# Patient Record
Sex: MALE | Race: WHITE | NOT HISPANIC OR LATINO | Employment: OTHER | URBAN - NONMETROPOLITAN AREA
[De-identification: names, ages, dates, MRNs, and addresses within clinical notes are randomized per-mention and may not be internally consistent; named-entity substitution may affect disease eponyms.]

---

## 2018-01-08 ENCOUNTER — OFFICE VISIT CONVERTED (OUTPATIENT)
Dept: FAMILY MEDICINE CLINIC | Age: 53
End: 2018-01-08
Attending: FAMILY MEDICINE

## 2018-01-09 LAB
ALBUMIN SERPL-MCNC: 4.6 G/DL
ALBUMIN/GLOB SERPL: 1.6 {RATIO}
ALP SERPL-CCNC: 69 IU/L
ALT SERPL-CCNC: 44 IU/L
AST SERPL-CCNC: 45 IU/L
BILIRUB SERPL-MCNC: 0.5 MG/DL
BUN SERPL-MCNC: 10 MG/DL
BUN/CREAT SERPL: 9
CALCIUM SERPL-MCNC: 9.7 MG/DL
CHLORIDE SERPL-SCNC: 101 MMOL/L
CHOLEST SERPL-MCNC: 121 MG/DL
CO2 SERPL-SCNC: 27 MMOL/L
CONV TOTAL PROTEIN: 7.4 G/DL
CREAT UR-MCNC: 1.06 MG/DL
ERYTHROCYTE [DISTWIDTH] IN BLOOD BY AUTOMATED COUNT: 13.7 %
GLOBULIN UR ELPH-MCNC: 2.8 G/DL
GLUCOSE SERPL-MCNC: 103 MG/DL
HCT VFR BLD AUTO: 44 %
HDLC SERPL-MCNC: 27 MG/DL
HGB BLD-MCNC: 14.9 G/DL
INR PPP: 2.8
LDLC SERPL CALC-MCNC: 61 MG/DL
MCH RBC QN AUTO: 29.9 PG
MCHC RBC AUTO-ENTMCNC: 33.9 G/DL
MCV RBC AUTO: 88 FL
PLATELET # BLD AUTO: 158 X10E3/UL
POTASSIUM SERPL-SCNC: 4.5 MMOL/L
PROTHROMBIN TIME: 27.1 SEC
PSA SERPL-MCNC: 0.6 NG/ML
RBC # BLD AUTO: 4.98 X10E6/UL
SODIUM SERPL-SCNC: 141 MMOL/L
TRIGL SERPL-MCNC: 166 MG/DL
VLDLC SERPL-MCNC: 33 MG/DL
WBC # BLD AUTO: 4.7 X10E3/UL

## 2018-07-24 ENCOUNTER — OFFICE VISIT CONVERTED (OUTPATIENT)
Dept: FAMILY MEDICINE CLINIC | Age: 53
End: 2018-07-24
Attending: FAMILY MEDICINE

## 2018-10-24 ENCOUNTER — OFFICE VISIT CONVERTED (OUTPATIENT)
Dept: FAMILY MEDICINE CLINIC | Age: 53
End: 2018-10-24
Attending: FAMILY MEDICINE

## 2019-04-24 ENCOUNTER — OFFICE VISIT CONVERTED (OUTPATIENT)
Dept: FAMILY MEDICINE CLINIC | Age: 54
End: 2019-04-24
Attending: FAMILY MEDICINE

## 2019-04-24 ENCOUNTER — HOSPITAL ENCOUNTER (OUTPATIENT)
Dept: OTHER | Facility: HOSPITAL | Age: 54
Discharge: HOME | End: 2019-04-24
Attending: FAMILY MEDICINE

## 2019-04-24 LAB
ALBUMIN SERPL-MCNC: 4.4 G/DL (ref 3.5–5)
ALBUMIN/GLOB SERPL: 1.6 {RATIO} (ref 1.4–2.6)
ALP SERPL-CCNC: 65 U/L (ref 56–119)
ALT SERPL-CCNC: 27 U/L (ref 10–40)
ANION GAP SERPL CALC-SCNC: 18 MMOL/L (ref 8–19)
AST SERPL-CCNC: 39 U/L (ref 15–50)
BILIRUB SERPL-MCNC: 0.66 MG/DL (ref 0.2–1.3)
BUN SERPL-MCNC: 12 MG/DL (ref 5–25)
BUN/CREAT SERPL: 13 {RATIO} (ref 6–20)
CALCIUM SERPL-MCNC: 9.8 MG/DL (ref 8.7–10.4)
CHLORIDE SERPL-SCNC: 105 MMOL/L (ref 99–111)
CHOLEST SERPL-MCNC: 102 MG/DL (ref 107–200)
CHOLEST/HDLC SERPL: 3.4 {RATIO} (ref 3–6)
CONV CO2: 24 MMOL/L (ref 22–32)
CONV TOTAL PROTEIN: 7.1 G/DL (ref 6.3–8.2)
CREAT UR-MCNC: 0.91 MG/DL (ref 0.7–1.2)
GFR SERPLBLD BASED ON 1.73 SQ M-ARVRAT: >60 ML/MIN/{1.73_M2}
GLOBULIN UR ELPH-MCNC: 2.7 G/DL (ref 2–3.5)
GLUCOSE SERPL-MCNC: 120 MG/DL (ref 70–99)
HDLC SERPL-MCNC: 30 MG/DL (ref 40–60)
LDLC SERPL CALC-MCNC: 46 MG/DL (ref 70–100)
OSMOLALITY SERPL CALC.SUM OF ELEC: 295 MOSM/KG (ref 273–304)
POTASSIUM SERPL-SCNC: 4.8 MMOL/L (ref 3.5–5.3)
PSA SERPL-MCNC: 0.77 NG/ML (ref 0–4)
SODIUM SERPL-SCNC: 142 MMOL/L (ref 135–147)
TRIGL SERPL-MCNC: 128 MG/DL (ref 40–150)
VLDLC SERPL-MCNC: 26 MG/DL (ref 5–37)

## 2019-05-06 ENCOUNTER — CONVERSION ENCOUNTER (OUTPATIENT)
Dept: FAMILY MEDICINE CLINIC | Age: 54
End: 2019-05-06

## 2019-05-07 ENCOUNTER — OFFICE VISIT CONVERTED (OUTPATIENT)
Dept: FAMILY MEDICINE CLINIC | Age: 54
End: 2019-05-07
Attending: FAMILY MEDICINE

## 2019-05-20 ENCOUNTER — CONVERSION ENCOUNTER (OUTPATIENT)
Dept: FAMILY MEDICINE CLINIC | Age: 54
End: 2019-05-20

## 2019-06-03 ENCOUNTER — CONVERSION ENCOUNTER (OUTPATIENT)
Dept: FAMILY MEDICINE CLINIC | Age: 54
End: 2019-06-03

## 2019-07-01 ENCOUNTER — CONVERSION ENCOUNTER (OUTPATIENT)
Dept: FAMILY MEDICINE CLINIC | Age: 54
End: 2019-07-01

## 2019-08-02 ENCOUNTER — CONVERSION ENCOUNTER (OUTPATIENT)
Dept: FAMILY MEDICINE CLINIC | Age: 54
End: 2019-08-02

## 2019-08-30 ENCOUNTER — CONVERSION ENCOUNTER (OUTPATIENT)
Dept: FAMILY MEDICINE CLINIC | Age: 54
End: 2019-08-30

## 2019-09-27 ENCOUNTER — CONVERSION ENCOUNTER (OUTPATIENT)
Dept: FAMILY MEDICINE CLINIC | Age: 54
End: 2019-09-27

## 2019-10-03 ENCOUNTER — CONVERSION ENCOUNTER (OUTPATIENT)
Dept: FAMILY MEDICINE CLINIC | Age: 54
End: 2019-10-03

## 2019-11-21 ENCOUNTER — CONVERSION ENCOUNTER (OUTPATIENT)
Dept: FAMILY MEDICINE CLINIC | Age: 54
End: 2019-11-21

## 2019-12-26 ENCOUNTER — CONVERSION ENCOUNTER (OUTPATIENT)
Dept: FAMILY MEDICINE CLINIC | Age: 54
End: 2019-12-26

## 2020-01-10 ENCOUNTER — CONVERSION ENCOUNTER (OUTPATIENT)
Dept: FAMILY MEDICINE CLINIC | Age: 55
End: 2020-01-10

## 2020-01-31 ENCOUNTER — CONVERSION ENCOUNTER (OUTPATIENT)
Dept: FAMILY MEDICINE CLINIC | Age: 55
End: 2020-01-31

## 2020-02-28 ENCOUNTER — CONVERSION ENCOUNTER (OUTPATIENT)
Dept: FAMILY MEDICINE CLINIC | Age: 55
End: 2020-02-28

## 2020-03-10 ENCOUNTER — HOSPITAL ENCOUNTER (OUTPATIENT)
Dept: OTHER | Facility: HOSPITAL | Age: 55
Discharge: HOME | End: 2020-03-10
Attending: FAMILY MEDICINE

## 2020-03-10 ENCOUNTER — OFFICE VISIT CONVERTED (OUTPATIENT)
Dept: FAMILY MEDICINE CLINIC | Age: 55
End: 2020-03-10
Attending: FAMILY MEDICINE

## 2020-03-10 LAB
ALBUMIN SERPL-MCNC: 4.7 G/DL (ref 3.5–5)
ALBUMIN/GLOB SERPL: 1.5 {RATIO} (ref 1.4–2.6)
ALP SERPL-CCNC: 78 U/L (ref 56–119)
ALT SERPL-CCNC: 34 U/L (ref 10–40)
ANION GAP SERPL CALC-SCNC: 21 MMOL/L (ref 8–19)
APPEARANCE UR: CLEAR
AST SERPL-CCNC: 40 U/L (ref 15–50)
BACTERIA UR QL AUTO: ABNORMAL
BILIRUB SERPL-MCNC: 0.58 MG/DL (ref 0.2–1.3)
BILIRUB UR QL: NEGATIVE
BUN SERPL-MCNC: 16 MG/DL (ref 5–25)
BUN/CREAT SERPL: 14 {RATIO} (ref 6–20)
CALCIUM SERPL-MCNC: 10.7 MG/DL (ref 8.7–10.4)
CASTS URNS QL MICRO: ABNORMAL /[LPF]
CHLORIDE SERPL-SCNC: 104 MMOL/L (ref 99–111)
CHOLEST SERPL-MCNC: 136 MG/DL (ref 107–200)
CHOLEST/HDLC SERPL: 4.4 {RATIO} (ref 3–6)
COLOR UR: YELLOW
CONV CO2: 21 MMOL/L (ref 22–32)
CONV LEUKOCYTE ESTERASE: NEGATIVE
CONV TOTAL PROTEIN: 7.8 G/DL (ref 6.3–8.2)
CONV UROBILINOGEN IN URINE BY AUTOMATED TEST STRIP: 0.2 {EHRLICHU}/DL (ref 0.1–1)
CREAT UR-MCNC: 1.17 MG/DL (ref 0.7–1.2)
EPI CELLS #/AREA URNS HPF: ABNORMAL /[HPF]
GFR SERPLBLD BASED ON 1.73 SQ M-ARVRAT: >60 ML/MIN/{1.73_M2}
GLOBULIN UR ELPH-MCNC: 3.1 G/DL (ref 2–3.5)
GLUCOSE 24H UR-MCNC: NEGATIVE MG/DL
GLUCOSE SERPL-MCNC: 116 MG/DL (ref 70–99)
HDLC SERPL-MCNC: 31 MG/DL (ref 40–60)
HGB UR QL STRIP: ABNORMAL
KETONES UR QL STRIP: NEGATIVE MG/DL
LDLC SERPL CALC-MCNC: 62 MG/DL (ref 70–100)
MUCOUS THREADS URNS QL MICRO: ABNORMAL
NITRITE UR-MCNC: NEGATIVE MG/ML
OSMOLALITY SERPL CALC.SUM OF ELEC: 296 MOSM/KG (ref 273–304)
PH UR STRIP.AUTO: 7 [PH] (ref 5–8)
POTASSIUM SERPL-SCNC: 4.4 MMOL/L (ref 3.5–5.3)
PROT UR-MCNC: 30 MG/DL
PSA SERPL-MCNC: 0.95 NG/ML (ref 0–4)
RBC # BLD AUTO: ABNORMAL /[HPF]
SODIUM SERPL-SCNC: 142 MMOL/L (ref 135–147)
SP GR UR STRIP: 1.02 (ref 1–1.03)
SPECIMEN SOURCE: ABNORMAL
TRIGL SERPL-MCNC: 214 MG/DL (ref 40–150)
UNIDENT CRYS URNS QL MICRO: ABNORMAL /[HPF]
VLDLC SERPL-MCNC: 43 MG/DL (ref 5–37)
WBC #/AREA URNS HPF: ABNORMAL /[HPF]

## 2020-04-17 ENCOUNTER — OFFICE VISIT CONVERTED (OUTPATIENT)
Dept: FAMILY MEDICINE CLINIC | Age: 55
End: 2020-04-17
Attending: NURSE PRACTITIONER

## 2020-06-05 ENCOUNTER — HOSPITAL ENCOUNTER (OUTPATIENT)
Dept: OTHER | Facility: HOSPITAL | Age: 55
Discharge: HOME | End: 2020-06-05
Attending: FAMILY MEDICINE

## 2020-06-05 LAB
APPEARANCE UR: CLEAR
BACTERIA UR QL AUTO: ABNORMAL
BILIRUB UR QL: NEGATIVE
CASTS URNS QL MICRO: ABNORMAL /[LPF]
COLOR UR: YELLOW
CONV LEUKOCYTE ESTERASE: NEGATIVE
CONV UROBILINOGEN IN URINE BY AUTOMATED TEST STRIP: 0.2 {EHRLICHU}/DL (ref 0.1–1)
EPI CELLS #/AREA URNS HPF: ABNORMAL /[HPF]
GLUCOSE 24H UR-MCNC: NEGATIVE MG/DL
HGB UR QL STRIP: ABNORMAL
KETONES UR QL STRIP: NEGATIVE MG/DL
MUCOUS THREADS URNS QL MICRO: ABNORMAL
NITRITE UR-MCNC: NEGATIVE MG/ML
PH UR STRIP.AUTO: 7 [PH] (ref 5–8)
PROT UR-MCNC: NEGATIVE MG/DL
RBC # BLD AUTO: ABNORMAL /[HPF]
SP GR UR STRIP: 1.01 (ref 1–1.03)
SPECIMEN SOURCE: ABNORMAL
UNIDENT CRYS URNS QL MICRO: ABNORMAL /[HPF]
WBC #/AREA URNS HPF: ABNORMAL /[HPF]

## 2020-07-02 ENCOUNTER — CONVERSION ENCOUNTER (OUTPATIENT)
Dept: FAMILY MEDICINE CLINIC | Age: 55
End: 2020-07-02

## 2020-07-15 ENCOUNTER — OFFICE VISIT CONVERTED (OUTPATIENT)
Dept: SURGERY | Facility: CLINIC | Age: 55
End: 2020-07-15
Attending: NURSE PRACTITIONER

## 2020-07-15 ENCOUNTER — HOSPITAL ENCOUNTER (OUTPATIENT)
Dept: SURGERY | Facility: CLINIC | Age: 55
Discharge: HOME | End: 2020-07-15
Attending: NURSE PRACTITIONER

## 2020-07-15 LAB
APPEARANCE UR: CLEAR
BILIRUB UR QL: NEGATIVE
COLOR UR: YELLOW
CONV BACTERIA: NEGATIVE
CONV COLLECTION SOURCE (UA): ABNORMAL
CONV UROBILINOGEN IN URINE BY AUTOMATED TEST STRIP: 0.2 {EHRLICHU}/DL (ref 0.1–1)
GLUCOSE UR QL: NEGATIVE MG/DL
HGB UR QL STRIP: ABNORMAL
KETONES UR QL STRIP: NEGATIVE MG/DL
LEUKOCYTE ESTERASE UR QL STRIP: NEGATIVE
NITRITE UR QL STRIP: NEGATIVE
PH UR STRIP.AUTO: 5.5 [PH] (ref 5–8)
PROT UR QL: 100 MG/DL
RBC #/AREA URNS HPF: ABNORMAL /[HPF]
SP GR UR: 1.02 (ref 1–1.03)
WBC #/AREA URNS HPF: ABNORMAL /[HPF]

## 2020-07-16 ENCOUNTER — HOSPITAL ENCOUNTER (OUTPATIENT)
Dept: CT IMAGING | Facility: HOSPITAL | Age: 55
Discharge: HOME | End: 2020-07-16
Attending: NURSE PRACTITIONER

## 2020-07-16 LAB
CREAT BLD-MCNC: 1 MG/DL (ref 0.6–1.4)
GFR SERPLBLD BASED ON 1.73 SQ M-ARVRAT: >60 ML/MIN/{1.73_M2}

## 2020-07-17 LAB — BACTERIA UR CULT: NORMAL

## 2020-08-03 ENCOUNTER — CONVERSION ENCOUNTER (OUTPATIENT)
Dept: FAMILY MEDICINE CLINIC | Age: 55
End: 2020-08-03

## 2020-08-10 ENCOUNTER — OFFICE VISIT CONVERTED (OUTPATIENT)
Dept: UROLOGY | Facility: CLINIC | Age: 55
End: 2020-08-10
Attending: UROLOGY

## 2020-08-10 ENCOUNTER — CONVERSION ENCOUNTER (OUTPATIENT)
Dept: SURGERY | Facility: CLINIC | Age: 55
End: 2020-08-10

## 2020-08-31 ENCOUNTER — CONVERSION ENCOUNTER (OUTPATIENT)
Dept: FAMILY MEDICINE CLINIC | Age: 55
End: 2020-08-31

## 2020-09-10 ENCOUNTER — OFFICE VISIT CONVERTED (OUTPATIENT)
Dept: FAMILY MEDICINE CLINIC | Age: 55
End: 2020-09-10
Attending: FAMILY MEDICINE

## 2020-09-10 ENCOUNTER — HOSPITAL ENCOUNTER (OUTPATIENT)
Dept: OTHER | Facility: HOSPITAL | Age: 55
Discharge: HOME | End: 2020-09-10
Attending: FAMILY MEDICINE

## 2020-09-10 LAB
ALBUMIN SERPL-MCNC: 4.3 G/DL (ref 3.5–5)
ALBUMIN/GLOB SERPL: 1.5 {RATIO} (ref 1.4–2.6)
ALP SERPL-CCNC: 69 U/L (ref 56–119)
ALT SERPL-CCNC: 29 U/L (ref 10–40)
ANION GAP SERPL CALC-SCNC: 13 MMOL/L (ref 8–19)
AST SERPL-CCNC: 40 U/L (ref 15–50)
BILIRUB SERPL-MCNC: 0.51 MG/DL (ref 0.2–1.3)
BUN SERPL-MCNC: 15 MG/DL (ref 5–25)
BUN/CREAT SERPL: 15 {RATIO} (ref 6–20)
CALCIUM SERPL-MCNC: 9.7 MG/DL (ref 8.7–10.4)
CHLORIDE SERPL-SCNC: 103 MMOL/L (ref 99–111)
CHOLEST SERPL-MCNC: 113 MG/DL (ref 107–200)
CHOLEST/HDLC SERPL: 4.7 {RATIO} (ref 3–6)
CONV CO2: 27 MMOL/L (ref 22–32)
CONV TOTAL PROTEIN: 7.2 G/DL (ref 6.3–8.2)
CREAT UR-MCNC: 1.03 MG/DL (ref 0.7–1.2)
GFR SERPLBLD BASED ON 1.73 SQ M-ARVRAT: >60 ML/MIN/{1.73_M2}
GLOBULIN UR ELPH-MCNC: 2.9 G/DL (ref 2–3.5)
GLUCOSE SERPL-MCNC: 117 MG/DL (ref 70–99)
HDLC SERPL-MCNC: 24 MG/DL (ref 40–60)
LDLC SERPL CALC-MCNC: 57 MG/DL (ref 70–100)
OSMOLALITY SERPL CALC.SUM OF ELEC: 288 MOSM/KG (ref 273–304)
POTASSIUM SERPL-SCNC: 4.6 MMOL/L (ref 3.5–5.3)
SODIUM SERPL-SCNC: 138 MMOL/L (ref 135–147)
TRIGL SERPL-MCNC: 158 MG/DL (ref 40–150)
VLDLC SERPL-MCNC: 32 MG/DL (ref 5–37)

## 2020-10-08 ENCOUNTER — CONVERSION ENCOUNTER (OUTPATIENT)
Dept: FAMILY MEDICINE CLINIC | Age: 55
End: 2020-10-08

## 2020-11-05 ENCOUNTER — CONVERSION ENCOUNTER (OUTPATIENT)
Dept: FAMILY MEDICINE CLINIC | Age: 55
End: 2020-11-05

## 2020-11-11 ENCOUNTER — OFFICE VISIT CONVERTED (OUTPATIENT)
Dept: FAMILY MEDICINE CLINIC | Age: 55
End: 2020-11-11
Attending: NURSE PRACTITIONER

## 2020-11-11 ENCOUNTER — HOSPITAL ENCOUNTER (OUTPATIENT)
Dept: OTHER | Facility: HOSPITAL | Age: 55
Discharge: HOME | End: 2020-11-11
Attending: NURSE PRACTITIONER

## 2020-11-11 LAB
ALBUMIN SERPL-MCNC: 4.8 G/DL (ref 3.5–5)
ALBUMIN/GLOB SERPL: 1.7 {RATIO} (ref 1.4–2.6)
ALP SERPL-CCNC: 84 U/L (ref 56–119)
ALT SERPL-CCNC: 27 U/L (ref 10–40)
ANION GAP SERPL CALC-SCNC: 15 MMOL/L (ref 8–19)
APPEARANCE UR: CLEAR
AST SERPL-CCNC: 33 U/L (ref 15–50)
BACTERIA UR QL AUTO: ABNORMAL
BILIRUB SERPL-MCNC: 0.53 MG/DL (ref 0.2–1.3)
BILIRUB UR QL: NEGATIVE
BUN SERPL-MCNC: 18 MG/DL (ref 5–25)
BUN/CREAT SERPL: 16 {RATIO} (ref 6–20)
CALCIUM SERPL-MCNC: 9.7 MG/DL (ref 8.7–10.4)
CASTS URNS QL MICRO: ABNORMAL /[LPF]
CHLORIDE SERPL-SCNC: 104 MMOL/L (ref 99–111)
COLOR UR: ABNORMAL
CONV CO2: 26 MMOL/L (ref 22–32)
CONV LEUKOCYTE ESTERASE: NEGATIVE
CONV TOTAL PROTEIN: 7.7 G/DL (ref 6.3–8.2)
CONV UROBILINOGEN IN URINE BY AUTOMATED TEST STRIP: 0.2 {EHRLICHU}/DL (ref 0.1–1)
CREAT UR-MCNC: 1.1 MG/DL (ref 0.7–1.2)
EPI CELLS #/AREA URNS HPF: ABNORMAL /[HPF]
ERYTHROCYTE [DISTWIDTH] IN BLOOD BY AUTOMATED COUNT: 13.1 % (ref 11.5–14.5)
EST. AVERAGE GLUCOSE BLD GHB EST-MCNC: 146 MG/DL
GFR SERPLBLD BASED ON 1.73 SQ M-ARVRAT: >60 ML/MIN/{1.73_M2}
GLOBULIN UR ELPH-MCNC: 2.9 G/DL (ref 2–3.5)
GLUCOSE 24H UR-MCNC: NEGATIVE MG/DL
GLUCOSE SERPL-MCNC: 121 MG/DL (ref 70–99)
HBA1C MFR BLD: 15.6 G/DL (ref 14–18)
HBA1C MFR BLD: 6.7 % (ref 3.5–5.7)
HCT VFR BLD AUTO: 46.2 % (ref 42–52)
HGB UR QL STRIP: ABNORMAL
KETONES UR QL STRIP: NEGATIVE MG/DL
MCH RBC QN AUTO: 29.7 PG (ref 27–31)
MCHC RBC AUTO-ENTMCNC: 33.8 G/DL (ref 33–37)
MCV RBC AUTO: 87.8 FL (ref 80–96)
MUCOUS THREADS URNS QL MICRO: ABNORMAL
NITRITE UR-MCNC: NEGATIVE MG/ML
OSMOLALITY SERPL CALC.SUM OF ELEC: 295 MOSM/KG (ref 273–304)
PH UR STRIP.AUTO: 6 [PH] (ref 5–8)
PLATELET # BLD AUTO: 214 10*3/UL (ref 130–400)
PMV BLD AUTO: 10.8 FL (ref 7.4–10.4)
POTASSIUM SERPL-SCNC: 4.4 MMOL/L (ref 3.5–5.3)
PROT UR-MCNC: 100 MG/DL
RBC # BLD AUTO: 5.26 10*6/UL (ref 4.7–6.1)
RBC # BLD AUTO: ABNORMAL /[HPF]
SODIUM SERPL-SCNC: 141 MMOL/L (ref 135–147)
SP GR UR STRIP: >=1.03 (ref 1–1.03)
SPECIMEN SOURCE: ABNORMAL
UNIDENT CRYS URNS QL MICRO: ABNORMAL /[HPF]
WBC # BLD AUTO: 6.04 10*3/UL (ref 4.8–10.8)
WBC #/AREA URNS HPF: ABNORMAL /[HPF]

## 2020-11-14 LAB — SARS-COV-2 RNA SPEC QL NAA+PROBE: NOT DETECTED

## 2020-11-20 ENCOUNTER — OFFICE VISIT CONVERTED (OUTPATIENT)
Dept: FAMILY MEDICINE CLINIC | Age: 55
End: 2020-11-20
Attending: NURSE PRACTITIONER

## 2020-12-22 ENCOUNTER — CONVERSION ENCOUNTER (OUTPATIENT)
Dept: FAMILY MEDICINE CLINIC | Age: 55
End: 2020-12-22

## 2021-01-08 ENCOUNTER — OFFICE VISIT CONVERTED (OUTPATIENT)
Dept: FAMILY MEDICINE CLINIC | Age: 56
End: 2021-01-08
Attending: NURSE PRACTITIONER

## 2021-01-20 ENCOUNTER — CONVERSION ENCOUNTER (OUTPATIENT)
Dept: FAMILY MEDICINE CLINIC | Age: 56
End: 2021-01-20

## 2021-02-26 ENCOUNTER — CONVERSION ENCOUNTER (OUTPATIENT)
Dept: FAMILY MEDICINE CLINIC | Age: 56
End: 2021-02-26

## 2021-03-05 ENCOUNTER — CONVERSION ENCOUNTER (OUTPATIENT)
Dept: FAMILY MEDICINE CLINIC | Age: 56
End: 2021-03-05

## 2021-03-11 ENCOUNTER — HOSPITAL ENCOUNTER (OUTPATIENT)
Dept: OTHER | Facility: HOSPITAL | Age: 56
Discharge: HOME | End: 2021-03-11
Attending: FAMILY MEDICINE

## 2021-03-11 ENCOUNTER — OFFICE VISIT CONVERTED (OUTPATIENT)
Dept: FAMILY MEDICINE CLINIC | Age: 56
End: 2021-03-11
Attending: FAMILY MEDICINE

## 2021-03-11 LAB
ALBUMIN SERPL-MCNC: 4.5 G/DL (ref 3.5–5)
ALBUMIN/GLOB SERPL: 1.5 {RATIO} (ref 1.4–2.6)
ALP SERPL-CCNC: 66 U/L (ref 56–119)
ALT SERPL-CCNC: 44 U/L (ref 10–40)
ANION GAP SERPL CALC-SCNC: 20 MMOL/L (ref 8–19)
AST SERPL-CCNC: 42 U/L (ref 15–50)
BILIRUB SERPL-MCNC: 0.44 MG/DL (ref 0.2–1.3)
BUN SERPL-MCNC: 12 MG/DL (ref 5–25)
BUN/CREAT SERPL: 13 {RATIO} (ref 6–20)
CALCIUM SERPL-MCNC: 9.8 MG/DL (ref 8.7–10.4)
CHLORIDE SERPL-SCNC: 105 MMOL/L (ref 99–111)
CHOLEST SERPL-MCNC: 125 MG/DL (ref 107–200)
CHOLEST/HDLC SERPL: 4 {RATIO} (ref 3–6)
CONV CO2: 22 MMOL/L (ref 22–32)
CONV CREATININE URINE, RANDOM: 194 MG/DL (ref 10–300)
CONV MICROALBUM.,U,RANDOM: 195.7 MG/L (ref 0–20)
CONV TOTAL PROTEIN: 7.5 G/DL (ref 6.3–8.2)
CREAT UR-MCNC: 0.91 MG/DL (ref 0.7–1.2)
EST. AVERAGE GLUCOSE BLD GHB EST-MCNC: 120 MG/DL
GFR SERPLBLD BASED ON 1.73 SQ M-ARVRAT: >60 ML/MIN/{1.73_M2}
GLOBULIN UR ELPH-MCNC: 3 G/DL (ref 2–3.5)
GLUCOSE SERPL-MCNC: 165 MG/DL (ref 70–99)
HBA1C MFR BLD: 5.8 % (ref 3.5–5.7)
HDLC SERPL-MCNC: 31 MG/DL (ref 40–60)
LDLC SERPL CALC-MCNC: 58 MG/DL (ref 70–100)
MICROALBUMIN/CREAT UR: 100.9 MG/G{CRE} (ref 0–25)
OSMOLALITY SERPL CALC.SUM OF ELEC: 299 MOSM/KG (ref 273–304)
POTASSIUM SERPL-SCNC: 4.1 MMOL/L (ref 3.5–5.3)
PSA SERPL-MCNC: 0.8 NG/ML (ref 0–4)
SODIUM SERPL-SCNC: 143 MMOL/L (ref 135–147)
TRIGL SERPL-MCNC: 182 MG/DL (ref 40–150)
VLDLC SERPL-MCNC: 36 MG/DL (ref 5–37)

## 2021-03-26 ENCOUNTER — CONVERSION ENCOUNTER (OUTPATIENT)
Dept: FAMILY MEDICINE CLINIC | Age: 56
End: 2021-03-26

## 2021-03-29 ENCOUNTER — CONVERSION ENCOUNTER (OUTPATIENT)
Dept: FAMILY MEDICINE CLINIC | Age: 56
End: 2021-03-29

## 2021-04-06 ENCOUNTER — CONVERSION ENCOUNTER (OUTPATIENT)
Dept: FAMILY MEDICINE CLINIC | Age: 56
End: 2021-04-06

## 2021-04-20 ENCOUNTER — CONVERSION ENCOUNTER (OUTPATIENT)
Dept: FAMILY MEDICINE CLINIC | Age: 56
End: 2021-04-20

## 2021-05-10 NOTE — PROCEDURES
Procedure Note      Patient Name: Freedom Stevens   Patient ID: [id number]   Sex: Male   YOB: 1965    Primary Care Provider: Isabella Barber MD   Referring Provider: Isabella Barber MD    Visit Date: August 10, 2020    Provider: Ella Agrawal MD   Location: [facility]   Location Address: [address] [place] [address]  [id number]   Location Phone: [phone]          Cystoscopy Procedure:  PROCEDURE: Flexible cystoscope was passed per urethra into the bladder without difficulty after proper consent. The bladder was inspected in a systematic meridian fashion. There were no tumors, lesions, stones, or other abnormalities noted within the bladder. Of note, there was no increased vascularity as well. Both ureteral orifices were identified and were normal in appearance. The flexible cystoscope was removed. The patient tolerated the procedure well.   FOLLOW UP OFFICE NOTE: The CT scan of the Abdomen/Pelvis was abnormal. and He had a few small renal stones (less than 3mm) and polycystic kidney with several small benign renal cysts.           Assessment  · Hematuria     599.70/R31.9      Plan  · Orders  o Cystoscopy (71508) - 599.70/R31.9 - 08/10/2020  o CT Abdomen and Pelvis (with and without Contrast) with Bosniak Class and delayed images ([id number]-LG) - 599.70/R31.9 - 08/10/2021  · Medications  o Medications have been Reconciled  o Transition of Care or Provider Policy  · Instructions  o We will follow up in one year or sooner if needed.  o TIME OUT PROCEDURE: Correct patient and birth date; Correct procedure; Correct Physician; Consent signed  o His workup for hematuria is negative. He will need a repeat CT scan in one year to evaluate stones and cysts.             Electronically Signed by: Ella Agrawal MD -Author on August 10, 2020 06:01:19 PM

## 2021-05-10 NOTE — H&P
History and Physical      Patient Name: Freedom Stevens   Patient ID: [id number]   Sex: Male   YOB: 1965    Primary Care Provider: Isabella Barber MD   Referring Provider: Isabella Barber MD    Visit Date: July 15, 2020    Provider: SHAZIA Meza   Location: [facility]   Location Address: [address] [place] [address]  [id number]   Location Phone: [phone]          Chief Complaint  · Microscopic hematuria  · frequency  · urgency      History Of Present Illness  He was found to have microhematuria at Dr. Isabella Barber office on 2020. This has been present for 4 months. He has not had a workup for hematuria in the past. He denies any recent UTI's within the past year. He admits to frequency &urgency. Denies dysuria. He does have a history of kidney stones. Reports that he was able to pass stone on his own. Admits to Nocturia 3-4x/night. Denies any abdominal pain. Admits to right flank pain. Denies any scrotum or penis pain. Reports good pressure without hesitancy. Denies being a smoker. Admits to taking Coumadin for artificial valve is under the care of Dr. Tellez. Admits to a recent fall where he fell off a ladder about 2 months ago. Admits to a family history with prostate cancer with 2 brothers. Denies any family history of renal disease.      Previous urinalysis:  2020:                 3/2020:  color: yellow          color: yellow  clarity: clear          clarity: clear  S.015              S.020  pH: 7.0                  pH: 7.0  Pro: negative         Pro: 30+  Glu: negative         Glu: negative  Ket: negative         Ket: negative  Bili: negative         Bili: negative  Occ: trace             Occ: trace  Nit: negative          Nit: negative  Uro: 0.2                Uro: 0.2  Leuko: negative     Leuko: negative    Previous PSA's:  2019: 0.77  3/2020: 0.95       Past Medical History  Disease Name Date Onset Notes   Blood Diseases --  --    Chest pain --  --     Congestive heart failure --  --    Heart disease --  --    High blood pressure --  --          Past Surgical History  Procedure Name Date Notes   Aortic valve replacement --  --    Cardiac --  --    Gallbladder --  --          Medication List  Name Date Started Instructions   lithium carbonate oral  --    lorazepam 1 mg oral tablet  take 1 tablet (1 mg) by oral route 2 times per day   losartan 25 mg oral tablet  take 1 tablet (25 mg) by oral route once daily   metoprolol succinate 100 mg oral tablet extended release 24 hr  take 1 tablet (100 mg) by oral route once daily   pramipexole 0.25 mg oral tablet  take 1 tablet by oral route 2 times a day   pravastatin 20 mg oral tablet  take 1 tablet (20 mg) by oral route once daily at bedtime   risperidone 2 mg oral tablet  take 1 tablet (2 mg) by oral route once daily   temazepam 30 mg oral capsule  take 1 capsule (30 mg) by oral route once daily at bedtime as needed   warfarin 7.5 mg oral tablet  take 1 tablet (7.5 mg) by oral route twice weekly and 7.0mg 5 days per week         Allergy List  Allergen Name Date Reaction Notes   NO KNOWN DRUG ALLERGIES --  --  --    Shell Fish (shrimp, crayfish, lobster, crab) --  --  --        Allergies Reconciled  Family Medical History  Disease Name Relative/Age Notes   - No Family History of Colorectal Cancer  --    Prostate cancer Brother/40s   --    Family history of colon cancer Brother/40s  Sister/40s   Brother/40s; Sister/40s; Aunt         Social History  Finding Status Start/Stop Quantity Notes   Alcohol --  --/-- --  --    Tobacco Never --/-- --  05/24/2017 - never 04/12/2017 -never          Review of Systems  · Constitutional  o Denies  o : fever, chills  · Cardiovascular  o Denies  o : reviewed and unchanged  · Genitourinary  o Admits  o : urgency, frequency, nocturia  o Denies  o : dysuria, hematuria, oliguria, change in urine color, incontinence, urinary retention, difficulty voiding, urinary hesitancy, decreased  "stream  · Endocrine  o Denies  o : weight loss, reviewed and unchanged      Vitals  Date Time BP Position Site L\R Cuff Size HR RR TEMP (F) WT  HT  BMI kg/m2 BSA m2 O2 Sat        07/15/2020 02:25 PM       16  285lbs 0oz 5'  11\" 39.75 2.54           Physical Examination  · Constitutional  o Appearance  o : Well nourished, well groomed. Atraumatic.           Results  · In-Office Procedures  o Lab procedure  § Automated Dipstick Urinalysis ([facility]urg [facility]) WITHOUT Micro [facility] ([id number])   § Color Ur: Yellow   § Clarity Ur: Clear   § Glucose Ur Ql Strip: Negative   § Bilirub Ur Ql Strip: Negative   § Ketones Ur Ql Strip: Negative   § Sp Gr Ur Qn: 1.020   § Hgb Ur Ql Strip: Moderate   § pH Ur-LsCnc: 6.0   § Prot Ur Ql Strip: 100 mg/dL   § Urobilinogen Ur Strip-mCnc: 0.2 E.U./dL   § Nitrite Ur Ql Strip: Negative   § WBC Est Ur Ql Strip: Negative       Assessment  · Microscopic hematuria     599.72/R31.29  · Nocturia     788.43/R35.1      Plan  · Orders  o Urine Culture (Clean Catch) [facility] ([id number]) - 788.43/R35.1, 599.72/R31.29 - 07/15/2020  o IOP - Urinalysis (Clinitek) with Microscopy (50784) - 788.43/R35.1, 599.72/R31.29 - 07/15/2020  o CT Abdomen and Pelvis without and with Contrast UROLOGY PROTOCOL (includes delayed imaging) [facility] ([id number]) - 788.43/R35.1, 599.72/R31.29 - 07/15/2020  · Medications  o Medications have been Reconciled  o Transition of Care or Provider Policy  · Instructions  o Work-up for hematuria will be performed. A CT scan of the abdomen and pelvis with and without IV contrast will be performed to evaluate the kidneys and ureters. A flexible cystoscopy will be performed by Dr. Ella Agrawal in the office at the next visit to evaluate the bladder.   o Electronically Identified Patient Education Materials Provided Electronically  · Disposition  o Call or Return if symptoms worsen or persist.            Electronically Signed by: SHAZIA Meza -Author on July 15, 2020 03:11:29 PM  "

## 2021-05-15 VITALS
BODY MASS INDEX: 39.76 KG/M2 | WEIGHT: 284 LBS | HEIGHT: 71 IN | SYSTOLIC BLOOD PRESSURE: 147 MMHG | DIASTOLIC BLOOD PRESSURE: 71 MMHG

## 2021-05-15 VITALS — WEIGHT: 285 LBS | RESPIRATION RATE: 16 BRPM | BODY MASS INDEX: 39.9 KG/M2 | HEIGHT: 71 IN

## 2021-05-18 ENCOUNTER — CONVERSION ENCOUNTER (OUTPATIENT)
Dept: FAMILY MEDICINE CLINIC | Age: 56
End: 2021-05-18

## 2021-05-18 NOTE — PROGRESS NOTES
Freedom Stevens  1965     Office/Outpatient Visit    Visit Date: Wed, Nov 11, 2020 09:08 am    Provider: Radha Nicole N.P. (Assistant: Cydney Barron LPN)    Location: [facility]        Electronically signed by Radha Nicole N.P. on  11/11/2020 11:41:57 AM                             Subjective:        CC: Toro is a 55 year old White male.  Stomach ache, Head feels weird,         HPI:           Patient complains of unspecified abdominal pain.  Toro complains of abdominal pain that is diffuse in location.  It began 2 weeks ago.  He characterizes it as aching, cramping, and dull.  It is of moderate intensity.  He estimates that the frequency of pain is waxes and wanes.  The typical duration is the majority of the day.  There are no obvious aggravating factors.  The pain is relieved with meals.  Associated symptoms include nausea.  He denies constipation, diarrhea, dysuria, fever, hematuria, red bloody stools or vomiting.  Other pertinent history includes CLN 2014 wnl.  Toro denies recent travel.  Pertinent medical history includes GERD.  Pertinent surgical history includes prior cholecystectomy.      ROS:     CONSTITUTIONAL:  Negative for chills, fatigue and fever.      CARDIOVASCULAR:  Negative for chest pain, orthopnea, paroxysmal nocturnal dyspnea and pedal edema.      RESPIRATORY:  Negative for dyspnea and cough.      GASTROINTESTINAL:  Positive for abdominal pain ( diffuse ) and nausea.   Negative for constipation, diarrhea or vomiting.      NEUROLOGICAL:  Positive for head dont feel right.   Negative for dizziness.      PSYCHIATRIC:  Negative for anxiety and depression.          Past Medical History / Family History / Social History:         Last Reviewed on 11/11/2020 09:29 AM by Radha Nicole    Past Medical History:             PAST MEDICAL HISTORY         Hyperlipidemia    Hypertension    congenital aortic stenosis s/p mechanical AVR     Sleep Apnea     Erectile  Dysfunction     Anxiety    Bipolar Disorder         PREVENTIVE HEALTH MAINTENANCE             COLORECTAL CANCER SCREENING: Up to date (colonoscopy q10y; sigmoidoscopy q5y; Cologuard q3y) was last done 2014, Results are in chart; colonoscopy with normal results; The next colonoscopy is due  2024; Dr. Reed     PSA: was last done 2018 with normal results         Surgical History:         Cholecystectomy    Tonsillectomy    Vasectomy     mechanical aortic valve repair;    R ear surgery;    ascending aortic dilatation;         Family History:         Brother(s): aortic stenosis     Paternal Grandfather: Coronary Artery Disease     Maternal Grandfather: Coronary Artery Disease         Social History:     Occupation:    Disabled. previously worked for [employer];     Marital Status:      Children: 3 children daughter           Tobacco/Alcohol/Supplements:     Last Reviewed on 2020 09:29 AM by Radha Nicole    Tobacco: He has never smoked.          Current Problems:     Last Reviewed on 2020 09:29 AM by Radha Nicole    Bipolar disorder, unspecified    HTN - Essential (primary) hypertension    Obstructive sleep apnea (adult) (pediatric)    Presence of prosthetic heart valve    Neoplasm of uncertain behavior of pharynx    Long term (current) use of anticoagulants    Other symptoms and signs involving cognitive functions and awareness    Vitamin D deficiency, unspecified    Anxiety disorder, unspecified    Headache        Immunizations:     influenza, injectable, quadrivalent, preservative free (FLUZONE QUAD 0700-0906) 9/10/2020    Hep A, adult dose 10/24/2018    Hep A, adult dose 2019    zzFluzone pf-quadrivalent 3 and up 2016    Fluzone (3 + years dose) 2017    Fluzone Quadrivalent (3+ years) 10/5/2018    Fluzone Quadrivalent (3+ years) 2019    Tdap (Tetanus, reduced diph, acellular pertussis) 10/24/2018        Allergies:     Last Reviewed on 2020  09:29 AM by Radha Nicole    shrimp:          Current Medications:     Last Reviewed on 11/11/2020 09:29 AM by Radha Nicole    Lorazepam 1 mg oral tablet [1 tab tid]    Risperidone 2 mg oral tablet [ONE TABLET AT BEDTIME]    Lithium Carbonate 450 mg oral tablet, extended release [2 tabs daily]    Pramipexole 0.25 mg oral tablet [one bid]    pravastatin 20 mg oral tablet [TAKE 1 TABLET BY MOUTH ONCE DAILY AT BEDTIME]    warfarin 5 mg oral tablet [6mg MWF, 6.5mg rest]    warfarin 1 mg oral tablet [6mg MWF, 6.5mg rest]    metoprolol succinate 100 mg oral Tablet, Extended Release 24 hr [1 tab daily]    Temazepam 30 mg oral capsule [One PO Q HS]    losartan 50 mg oral tablet [take 1 tablet (50 mg) by oral route daily]        Objective:        Vitals:         Current: 11/11/2020 9:14:57 AM    Ht:  5 ft, 11.5 in;  Wt: 280.2 lbs;  BMI: 38.5T: 95.4 F (temporal);  BP: 151/87 mm Hg (right arm, sitting);  P: 51 bpm (right arm (BP Cuff), sitting);  sCr: 1.03 mg/dL;  GFR: 95.00O2 Sat: 100 % (room air)        Exams:     PHYSICAL EXAM:     GENERAL: Vitals recorded well developed, well nourished;  well groomed;  no apparent distress;     RESPIRATORY: normal respiratory rate and pattern with no distress; normal breath sounds with no rales, rhonchi, wheezes or rubs;     CARDIOVASCULAR: normal rate; rhythm is regular;  normal S1; normal S2; no systolic murmur; no cyanosis; no edema;     GASTROINTESTINAL: mild diffuse tenderness;  normal bowel sounds; no organomegaly;     NEUROLOGIC: GROSSLY INTACT     PSYCHIATRIC:  appropriate affect and demeanor; normal speech pattern; grossly normal memory;         Assessment:         R10.9   Unspecified abdominal pain       R05   Cough           ORDERS:         Meds Prescribed:       [New Rx] pantoprazole 40 mg oral tablet, delayed release (enteric coated) [take 1 tablet (40 mg) daily], #30 (thirty) tablets, Refills: 1 (one)         Lab Orders:       [id number]  COVID 19 Testing [facility]   (Send-Out)            [id number]  BDCB2 - [facility] CBC w/o diff  (Send-Out)            39284  COMP - [facility] Comp. Metabolic Panel  (Send-Out)            23901  HPUBT - [facility] H.pylori Breath test  (Send-Out)            [id number]  BDUAM - [facility] Urinalysis, automated, with micro  (Send-Out)                      Plan:         Unspecified abdominal pain    LABORATORY:  Labs ordered to be performed today include CBC W/O DIFF, Comprehensive metabolic panel, H.pylori urea breath test ([facility]), and urinalysis with micro.      RECOMMENDATIONS given include: Further recommendation to be given after test results are complete, increase fluid intake, and rest.      FOLLOW-UP:.  :for BP elevated at OV, discussed with PT needs to fu with PCP , Dr Barber:Chronic visit follow up           Prescriptions:       [New Rx] pantoprazole 40 mg oral tablet, delayed release (enteric coated) [take 1 tablet (40 mg) daily], #30 (thirty) tablets, Refills: 1 (one)           Orders:       [id number]  BDCB2 - [facility] CBC w/o diff  (Send-Out)            85696  COMP - [facility] Comp. Metabolic Panel  (Send-Out)            09907  HPUBT - [facility] H.pylori Breath test  (Send-Out)            22327  BDUAM - [facility] Urinalysis, automated, with micro  (Send-Out)              Cough          Orders:       [id number]  COVID 19 Testing [facility]  (Send-Out)                  Patient Recommendations:        For  Unspecified abdominal pain:    Drink plenty of fluids.  Fever increases the loss of fluids and can lead to dehydration. Get plenty of rest.                  APPOINTMENT INFORMATION:        Monday Tuesday Wednesday Thursday Friday Saturday Sunday            Time:___________________AM  PM   Date:_____________________             Charge Capture:         Primary Diagnosis:     R10.9  Unspecified abdominal pain           Orders:      [id number]  Office/outpatient visit; established patient, level 3  (In-House)              R05  Cough

## 2021-05-18 NOTE — PROGRESS NOTES
Freedom Stevens  1965     Office/Outpatient Visit    Visit Date: Fri, Jan 8, 2021 11:44 am    Provider: Tracy Arzate N.P. (Assistant: Miriam Das MA)    Location: [facility]        Electronically signed by Tracy Arzate N.P. on  01/09/2021 09:33:10 PM                             Subjective:        CC: Toro is a 55 year old White male.  This is a follow-up visit.          HPI:           Patient to be evaluated for hTN - Essential (primary) hypertension.  His current cardiac medication regimen includes losartan, metoprolol.  Compliance with treatment has been good; he takes his medication as directed.  He is tolerating the medication well without side effects.  He did not bring his blood pressure diary, but says that pressures have been too high.            Additionally, he presents with history of low back pain.  the location is primarily in the lumbar spine.  It does not radiate.  This is a chronic, but intermittent problem with an acute exacerbation.  He states that the current episode of pain started one week ago.  He does not recall any precipitating event or injury.  He denies any associated symptoms.      ROS:     CONSTITUTIONAL:  Negative for chills, fatigue, fever, and weight change.      CARDIOVASCULAR:  Negative for chest pain, palpitations, tachycardia, orthopnea, and edema.      RESPIRATORY:  Negative for cough, dyspnea, and hemoptysis.      MUSCULOSKELETAL:  Positive for back pain ( acute ).      NEUROLOGICAL:  Negative for dizziness, headaches, paresthesias, and weakness.      PSYCHIATRIC:  Positive for depression ( (stable) ).          Past Medical History / Family History / Social History:         Last Reviewed on 1/08/2021 11:58 AM by Tracy Arzate    Past Medical History:             PAST MEDICAL HISTORY         Hyperlipidemia    Hypertension    congenital aortic stenosis s/p mechanical AVR     Sleep Apnea     Erectile Dysfunction     Anxiety    Bipolar  Disorder         PREVENTIVE HEALTH MAINTENANCE             COLORECTAL CANCER SCREENING: Up to date (colonoscopy q10y; sigmoidoscopy q5y; Cologuard q3y) was last done 2014, Results are in chart; colonoscopy with normal results; The next colonoscopy is due  2024; Dr. Reed     PSA: was last done 2020 with normal results         Surgical History:         Cholecystectomy    Tonsillectomy    Vasectomy     mechanical aortic valve repair;    R ear surgery;    ascending aortic dilatation;         Family History:         Brother(s): aortic stenosis     Paternal Grandfather: Coronary Artery Disease     Maternal Grandfather: Coronary Artery Disease Father:  at age 60s; Cause of death was nursing home , unsure cause     Mother:  at age 77; Cause of death was Dementia    ; Positive for Coronary Artery Disease;         Social History:     Occupation:    Disabled. previously worked for [employer];     Marital Status:      Children: 3 children daughter           Tobacco/Alcohol/Supplements:     Last Reviewed on 2021 11:49 AM by Miriam Das    Tobacco: He has never smoked.          Substance Abuse History:     Last Reviewed on 2020 01:17 PM by Radha Nicole    None         Mental Health History:     Last Reviewed on 2020 01:17 PM by Radha Nicole        Communicable Diseases (eg STDs):     Last Reviewed on 2020 01:17 PM by Radha Nicole        Current Problems:     Last Reviewed on 2020 01:17 PM by Radha Nicole    Bipolar disorder, unspecified    HTN - Essential (primary) hypertension    Obstructive sleep apnea (adult) (pediatric)    Presence of prosthetic heart valve    Neoplasm of uncertain behavior of pharynx    Long term (current) use of anticoagulants    Other symptoms and signs involving cognitive functions and awareness    Anxiety disorder, unspecified    Vitamin D deficiency, unspecified    Type 2 diabetes mellitus without  complications    Low back pain        Immunizations:     influenza, injectable, quadrivalent, preservative free (FLUZONE QUAD 0543-7727) 9/10/2020    Hep A, adult dose 10/24/2018    Hep A, adult dose 5/6/2019    zzFluzone pf-quadrivalent 3 and up 12/9/2016    Fluzone (3 + years dose) 9/27/2017    Fluzone Quadrivalent (3+ years) 10/5/2018    Fluzone Quadrivalent (3+ years) 9/27/2019    Tdap (Tetanus, reduced diph, acellular pertussis) 10/24/2018        Allergies:     Last Reviewed on 11/20/2020 01:17 PM by Radha Nicole    shrimp:          Current Medications:     Last Reviewed on 11/20/2020 01:17 PM by Radha Nicole    Lorazepam 1 mg oral tablet [1 tab tid]    Risperidone 2 mg oral tablet [ONE TABLET AT BEDTIME]    Lithium Carbonate 450 mg oral tablet, extended release [2 tabs daily]    Pramipexole 0.25 mg oral tablet [one bid]    pravastatin 20 mg oral tablet [TAKE 1 TABLET BY MOUTH ONCE DAILY AT BEDTIME]    warfarin 5 mg oral tablet [6mg MWF, 6.5mg rest]    warfarin 1 mg oral tablet [6mg MWF, 6.5mg rest]    metoprolol succinate 100 mg oral Tablet, Extended Release 24 hr [1 tab daily]    Temazepam 30 mg oral capsule [One PO Q HS]    losartan 50 mg oral tablet [take 1 tablet (50 mg) by oral route bid ]    pantoprazole 40 mg oral tablet, delayed release (enteric coated) [take 1 tablet (40 mg) daily]    sucralfate 1 gram oral tablet [take 1 tablet (1 gram) by oral route 2 times per day x 10 days on an empty stomach]    metFORMIN 500 mg oral tablet [take 1 tablet (500 mg) by oral route daily]        Objective:        Vitals:         Historical:     11/20/2020  BP:   151/61 mm Hg ( (left arm, , sitting, );) 11/20/2020  Wt:   278.6lbs    Current: 1/8/2021 11:51:36 AM    Ht:  5 ft, 11.5 in;  Wt: 286.4 lbs;  BMI: 39.4T: 96.7 F (temporal);  BP: 140/79 mm Hg (right arm, sitting);  P: 64 bpm (right arm (BP Cuff), sitting);  sCr: 1.1 mg/dL;  GFR: 89.79        Exams:     PHYSICAL EXAM:     GENERAL: no apparent  distress;     RESPIRATORY: normal respiratory rate and pattern with no distress; normal breath sounds with no rales, rhonchi, wheezes or rubs;     CARDIOVASCULAR: normal rate; rhythm is regular;  no edema;     MUSCULOSKELETAL: pain with range of motion in: back flexion;     NEUROLOGIC: mental status: alert and oriented x 3; GROSSLY INTACT     PSYCHIATRIC:  appropriate affect and demeanor; normal speech pattern; grossly normal memory;         Assessment:         I10   HTN - Essential (primary) hypertension       M54.5   Low back pain           ORDERS:         Meds Prescribed:       [New Rx] amLODIPine 2.5 mg oral tablet [take 1 tablet (2.5 mg) by oral route once daily], #30 (thirty) tablets, Refills: 2 (two)                 Plan:         HTN - Essential (primary) hypertension        add amlodpine to current losartan and metoprolol.  did not prescribe hctz due to potential interaction with lithium.   keep bp log.  follow up if not improving.            Prescriptions:       [New Rx] amLODIPine 2.5 mg oral tablet [take 1 tablet (2.5 mg) by oral route once daily], #30 (thirty) tablets, Refills: 2 (two)         Low back pain        RECOMMENDATIONS given include: alternating cold packs and moist heat, massage, and declines xray lumbar spine or prescription for antiinflammatory or muscle relaxer.  if not improving will advise lumbar spine xray and consider PT..              Patient Recommendations:        For  Low back pain:    I also recommend declines xray lumbar spine or prescription for antiinflammatory or muscle relaxer.  if not improving will advise lumbar spine xray and consider PT..              Charge Capture:         Primary Diagnosis:     I10  HTN - Essential (primary) hypertension           Orders:      [id number]  Office/outpatient visit; established patient, level 3  (In-House)              M54.5  Low back pain         ADDENDUMS:      ____________________________________    Addendum: 01/15/2021 12:44 PM - Huey  Tracy AKBAR        add [id number]    remove [id number]. lj

## 2021-05-18 NOTE — PROGRESS NOTES
Freedom Stevens 1965     Office/Outpatient Visit    Visit Date: Wed, Oct 24, 2018 08:36 am    Provider: Isabella Barber MD (Assistant: Angelika Coker MA)    Location: [facility]        Electronically signed by Isabella Barber MD on  10/24/2018 07:53:20 PM                             SUBJECTIVE:        CC:     Toro is a 53 year old White male.  physical exam         HPI:         Toro presents with health checkup.  He is UTD on colonoscopy, last done 12/2014 and this was normal.  Ten year repeat recommended.  He is UTD on PSA, last done 1/2018 and this was normal.  He is due for OMKAR.  He is UTD on flu vaccine (10/2018).  He is due for Shingrix, Havrix, and tetanus.  He is UTD on routine labs.         PHQ-9 Depression Screening: Completed form scanned and in chart; Total Score 11 Alcohol Consumption Screening: Completed form scanned and in chart; Total Score 1     ROS:     CONSTITUTIONAL:  Negative for fatigue and fever.      EYES:  Negative for blurred vision.      E/N/T:  Negative for diminished hearing and nasal congestion.      CARDIOVASCULAR:  Positive for dizziness ( lightheaded, when he first wakes up in the morning; happened 3 times last week ).   Negative for chest pain or palpitations.      RESPIRATORY:  Negative for recent cough and dyspnea.      GENITOURINARY:  Positive for malodorous urine.   Negative for dysuria, hematuria, nocturia or change in urine stream.      MUSCULOSKELETAL:  Negative for arthralgias and myalgias.      PSYCHIATRIC:  Positive for depression, anhedonia, difficulty concentrating and sleep disturbance.   Negative for anxiety, crying spells or suicidal thoughts.          PMH/FMH/SH:     Last Reviewed on 10/24/2018 08:57 AM by Isabella Barber    Past Medical History:             PAST MEDICAL HISTORY         Hyperlipidemia    Hypertension    congenital aortic stenosis s/p mechanical AVR     Sleep Apnea     Erectile Dysfunction     Anxiety    Bipolar Disorder          PREVENTIVE HEALTH MAINTENANCE             COLORECTAL CANCER SCREENING: Up to date (colonoscopy q10y; sigmoidoscopy q5y; Cologuard q3y) was last done 2014, Results are in chart; colonoscopy with normal results; The next colonoscopy is due  2024; Dr. Reed     PSA: was last done 2018 with normal results         Surgical History:         Cholecystectomy      Tonsillectomy    Vasectomy      mechanical aortic valve repair;    R ear surgery;    ascending aortic dilatation;         Family History:         Brother(s): aortic stenosis     Paternal Grandfather: Coronary Artery Disease     Maternal Grandfather: Coronary Artery Disease         Social History:     Occupation:    Disabled. previously worked for [employer];     Marital Status:      Children: 3 children daughter           Tobacco/Alcohol/Supplements:     Last Reviewed on 10/24/2018 08:57 AM by Isabella Barber    Tobacco: He has never smoked.          Substance Abuse History:     Last Reviewed on 10/24/2018 08:57 AM by Isabella Barber        Mental Health History:     Last Reviewed on 10/24/2018 08:57 AM by Isabella Barber        Communicable Diseases (eg STDs):     Last Reviewed on 10/24/2018 08:57 AM by Isabella Barber            Current Problems:     Last Reviewed on 2018 04:57 PM by Isabella Barber    Cellulitis of the arm     Neoplasm of uncertain behavior: oral cavity     Artificial heart valve replacement     Obstructive sleep apnea (adult) (pediatric)     Vitamin D deficiency, unspecified     Bipolar disorder     Hypertension     Hyperlipidemia     Anxiety     Anticoagulation followup         Immunizations:     zzFluzone pf-quadrivalent 3 and up 2016     Fluzone (3 + years dose) 2017     Fluzone Quadrivalent (3+ years) 10/5/2018         Allergies:     Last Reviewed on 2018 04:57 PM by Isabella Barber      No Known Drug Allergies.         Current Medications:     Last Reviewed on 2018 04:57 PM by Sunil  Isabella    Losartan 25mg Tablet Take 1 tablet(s) by mouth daily     Pravastatin 20mg Tablet 1 tab q day hs     Warfarin Sodium 2mg Tablet as directed     Metoprolol 100mg Tablets, Extended Release 1 tab daily     Lithium Carbonate 450mg Tablets, Extended Release 2 tabs daily     Lorazepam 1mg Tablet 1 tab tid     Pramipexole 0.25mg Tablet one bid     Risperidone 2mg Tablet ONE TABLET AT BEDTIME     Warfarin Sodium 5mg Tablet Take one tablet with 2mg tablet to equal 7mg     Zolpidem Tartrate 10mg Tablet 1 tab daily HS         OBJECTIVE:        Vitals:         Current: 10/24/2018 8:41:23 AM    Ht:  5 ft, 11.5 in;  Wt: 274 lbs;  BMI: 37.7    T: 98.5 F (oral);  BP: 147/85 mm Hg (left arm, sitting);  P: 55 bpm (left arm (BP Cuff), sitting);  sCr: 1.03 mg/dL;  GFR: 96.28        Exams:     PHYSICAL EXAM:     GENERAL: Vitals recorded well developed, well nourished;  well groomed;  no apparent distress;     EYES: extraocular movements intact; conjunctiva and cornea are normal; PERRL;     E/N/T: OROPHARYNX:  normal mucosa, dentition, gingiva, and posterior pharynx;     RESPIRATORY: normal respiratory rate and pattern with no distress; normal breath sounds with no rales, rhonchi, wheezes or rubs;     CARDIOVASCULAR: normal rate; rhythm is regular;  no systolic murmur;     GASTROINTESTINAL: nontender; normal bowel sounds;     GENITOURINARY: prostate:  no nodules, tenderness, or enlargement;     MUSCULOSKELETAL: normal gait; normal overall tone     NEUROLOGIC: mental status: alert and oriented x 3; reflexes: brachioradialis: 2+; knee jerks: 2+;     PSYCHIATRIC: affect/demeanor: flat;  normal psychomotor function; normal speech pattern; thought/perception: denies suicidal ideation;         Procedures:     Vaccination against hepatitis A     1. Hepatitis A (adult): 1.0 ml given IM in the left upper arm; administered by AS;  lot number [id number]; expires 03/06/21         Tetanus-diphtheria vaccination     1. Tdap: 0.5 ml unit dose given  IM in the right upper arm; administered by AS;  lot number [id number]; expires 03/09/21             ASSESSMENT           V70.0   Z00.00  Health checkup              DDx:     V79.0   Z13.89  Screening for depression              DDx:     791.9   R82.99  Abnormal odor of urine              DDx:     V05.3   Z23  Vaccination against hepatitis A              DDx:     V06.5   Z23  Tetanus-diphtheria vaccination              DDx:         ORDERS:         Radiology/Test Orders:       3017F  Colorectal CA screen results documented and reviewed (PV)  (In-House)           Lab Orders:       APPTO  Appointment need  (In-House)         39037  [facility] Urinalysis, automated, with micro  (Send-Out)           Procedures Ordered:       27951  HepA vaccine adult dose for intramuscular use  (In-House)         72524  Tetanus, diphtheria toxoid and acellular pertussis vaccine (TdaP),  for use in individuals seven yea  (In-House)         95304  Immunization administration; one vaccine  (In-House)         33677  Immunization administration; each additional vaccine/toxoid  (In-House)           Other Orders:         Negative EtOH screen  (In-House)           Calculated BMI above the upper parameter and a follow-up plan was documented in the medical record  (In-House)                   PLAN:          Health checkup He is UTD on colonoscopy, last done 12/2014 and this was normal.  Ten year repeat recommended.  He is UTD on PSA, last done 1/2018 and this was normal.  OMKAR was normal.  He is UTD on flu vaccine (10/2018).  He is due for Shingrix, Havrix, and tetanus.  Havrix and Td given today.  Shingrix can be done at the pharmacy.  He is UTD on routine labs.  RTC 6 months.     MIPS PHQ-9 Depression Screening: Completed form scanned and in chart; Total Score 11 Negative alcohol screen     COLORECTAL CANCER SCREENING: Results are in chart     BMI Elevated - Follow-Up Plan: He was provided education on weight loss strategies     FOLLOW-UP:  Schedule a follow-up visit in 6 months..  f/u HTN with [facility]           Orders:         Negative EtOH screen  (In-House)         3017F  Colorectal CA screen results documented and reviewed (PV)  (In-House)           Calculated BMI above the upper parameter and a follow-up plan was documented in the medical record  (In-House)         APPTO  Appointment need  (In-House)             Patient Education Handouts:       Physical Exam 50-59 year, Male           Screening for depression Score of 11.  Currently being treated for bipolar disorder by Dr. Mccauley.  Score improved from previous score of 22 back in July.          Abnormal odor of urine He has noticed abnormal odor of the urine for the past 2-3 weeks.  No other urinary sx.  Checking UA today to rule out infection.     LABORATORY:  Labs ordered to be performed today include urinalysis with micro.            Orders:       [id number]  [facility], [facility], with micro  (Send-Out)            Vaccination against hepatitis A         IMMUNIZATIONS given today: Hepatitis A.            Orders:       82171  HepA vaccine adult dose for intramuscular use  (In-House)         94441  Immunization administration; one vaccine  (In-House)            Tetanus-diphtheria vaccination         IMMUNIZATIONS given today: Boostrix (need ABN not covered by Medicare).            Orders:       53061  Tetanus, diphtheria toxoid and acellular pertussis vaccine (TdaP),  for use in individuals seven yea  (In-House)         23131  Immunization administration; each additional vaccine/toxoid  (In-House)               Patient Recommendations:        For  Health checkup:     Schedule a follow-up visit in 6 months.                APPOINTMENT INFORMATION:        Monday Tuesday Wednesday Thursday Friday Saturday Sunday            Time:___________________AM  PM   Date:_____________________             CHARGE CAPTURE           **Please note: ICD descriptions below are intended  for billing purposes only and may not represent clinical diagnoses**        Primary Diagnosis:         V70.0 Health checkup            Z00.00    Encntr for general adult medical exam w/o abnormal findings              Orders:          52829   Preventive medicine, established patient, age 40-64 years  (In-House)                Negative EtOH screen  (In-House)             3017F   Colorectal CA screen results documented and reviewed (PV)  (In-House)                Calculated BMI above the upper parameter and a follow-up plan was documented in the medical record  (In-House)             APPTO   Appointment need  (In-House)           V79.0 Screening for depression            Z13.89    Encounter for screening for other disorder    791.9 Abnormal odor of urine            R82.99    Other abnormal findings in urine    V05.3 Vaccination against hepatitis A            Z23    Encounter for immunization              Orders:          60154   HepA vaccine adult dose for intramuscular use  (In-House)             74348   Immunization administration; one vaccine  (In-House)           V06.5 Tetanus-diphtheria vaccination            Z23    Encounter for immunization              Orders:          86620   Tetanus, diphtheria toxoid and acellular pertussis vaccine (TdaP),  for use in individuals seven yea  (In-House)             51311   Immunization administration; each additional vaccine/toxoid  (In-House)

## 2021-05-18 NOTE — PROGRESS NOTES
Freedom Stevens  1965     Office/Outpatient Visit    Visit Date: Fri, Apr 17, 2020 12:56 pm    Provider: Keyla Lucas N.P. (Assistant: Angelika Coker MA)    Location: [facility]        Electronically signed by Keyla Lucas N.P. on  04/17/2020 04:09:21 PM                             Subjective:        CC: Toro is a 55 year old White male.  Medication refills         HPI: 54 y/o male presenting for TELEHEALTH visit via phone for f/u regarding HTN and sleep apnea. Pt states that he had been checking his blood pressure at home and was better. He was not home at time of visit and could not recall actual readings. He did state that his headaches have subsided and overall he feels better. He did not call for different type of sleep apnea mask and is still not using current mask.         Pt also requesting med refills. Labs last checked in March    ROS:     CONSTITUTIONAL:  Negative for chills, fatigue and fever.      CARDIOVASCULAR:  Negative for chest pain, dizziness, palpitations and pedal edema.      RESPIRATORY:  Negative for recent cough and dyspnea.      NEUROLOGICAL:  Negative for dizziness, headaches, paresthesias and weakness.      PSYCHIATRIC:  Negative for sleep disturbance.          Past Medical History / Family History / Social History:         Last Reviewed on 4/17/2020 03:49 PM by Keyla Lucas    Past Medical History:             PAST MEDICAL HISTORY         Hyperlipidemia    Hypertension    congenital aortic stenosis s/p mechanical AVR     Sleep Apnea     Erectile Dysfunction     Anxiety    Bipolar Disorder         PREVENTIVE HEALTH MAINTENANCE             COLORECTAL CANCER SCREENING: Up to date (colonoscopy q10y; sigmoidoscopy q5y; Cologuard q3y) was last done 12/13/2014, Results are in chart; colonoscopy with normal results; The next colonoscopy is due  12/2024; Dr. Reed     PSA: was last done 01/2018 with normal results         Surgical History:          Cholecystectomy    Tonsillectomy    Vasectomy     mechanical aortic valve repair;    R ear surgery;    ascending aortic dilatation;         Family History:         Brother(s): aortic stenosis     Paternal Grandfather: Coronary Artery Disease     Maternal Grandfather: Coronary Artery Disease         Social History:     Occupation:    Disabled. previously worked for [employer];     Marital Status:      Children: 3 children daughter           Tobacco/Alcohol/Supplements:     Last Reviewed on 2020 03:49 PM by Keyla Lucas    Tobacco: He has never smoked.          Substance Abuse History:     Last Reviewed on 2020 03:49 PM by Keyla Lucas    None         Mental Health History:     Last Reviewed on 2020 03:49 PM by Keyla Lucas        Communicable Diseases (eg STDs):     Last Reviewed on 3/10/2020 11:00 AM by Isabella Barber        Immunizations:     Hep A, adult dose 10/24/2018    Hep A, adult dose 2019    zzFluzone pf-quadrivalent 3 and up 2016    Fluzone (3 + years dose) 2017    Fluzone Quadrivalent (3+ years) 10/5/2018    Fluzone Quadrivalent (3+ years) 2019    Tdap (Tetanus, reduced diph, acellular pertussis) 10/24/2018        Allergies:     Last Reviewed on 2020 03:49 PM by Keyla Lucas    shrimp:          Current Medications:     Last Reviewed on 2020 03:49 PM by Keyla Lucas    Lorazepam 1 mg oral tablet [1 tab tid]    Risperidone 2 mg oral tablet [ONE TABLET AT BEDTIME]    Lithium Carbonate 450 mg oral tablet, extended release [2 tabs daily]    Pramipexole 0.25 mg oral tablet [one bid]    warfarin 5 mg oral tablet [TAKE 5MG BY MOUTH WITH 1MG TABLET FOR TOTAL OF 6MG ON MONDAY, WEDNESDAY AND FRIDAY AND THEN TAKE 6.5MG REST OF WEEK]    warfarin 1 mg oral tablet [6mg MWF, 6.5mg rest]    pravastatin 20 mg oral tablet [TAKE 1 TABLET BY MOUTH ONCE DAILY AT BEDTIME]    metoprolol succinate 100 mg oral Tablet, Extended Release 24 hr [1 tab  daily]    Temazepam 30 mg oral capsule [One PO Q HS]    losartan 50 mg oral tablet [take 1 tablet (50 mg) by oral route daily]        Assessment:         E78.2   HLD - Mixed hyperlipidemia       I10   HTN - Essential (primary) hypertension       Z79.01   Long term (current) use of anticoagulants       G47.33   Obstructive sleep apnea (adult) (pediatric)           ORDERS:         Meds Prescribed:       [Refilled] warfarin 5 mg oral tablet [TAKE 5MG BY MOUTH WITH 1MG TABLET FOR TOTAL OF 6MG ON MONDAY, WEDNESDAY AND FRIDAY AND THEN TAKE 6.5MG REST OF WEEK], #90 (ninety) each, Refills: 0 (zero)       [Refilled] warfarin 1 mg oral tablet [6mg MWF, 6.5mg rest], #90 (ninety) tablets, Refills: 0 (zero)       [Refilled] metoprolol succinate 100 mg oral Tablet, Extended Release 24 hr [1 tab daily], #90 (ninety) tablets, Refills: 1 (one)       [Refilled] pravastatin 20 mg oral tablet [TAKE 1 TABLET BY MOUTH ONCE DAILY AT BEDTIME], #90 (ninety) each, Refills: 1 (one)       [Refilled] losartan 50 mg oral tablet [take 1 tablet (50 mg) by oral route daily], #30 (thirty) tablets, Refills: 5 (five)                 Plan:         HLD - Mixed hyperlipidemiaPt to continue taking medication. Notify clinic with any acute concerns or issues. Pt v/u and had no further questions.           Prescriptions:       [Refilled] pravastatin 20 mg oral tablet [TAKE 1 TABLET BY MOUTH ONCE DAILY AT BEDTIME], #90 (ninety) each, Refills: 1 (one)         HTN - Essential (primary) hypertensionPt to continue taking medication. Notify clinic with any acute concerns or issues and if BP is above 130/80. Pt v/u and had no further questions.           Prescriptions:       [Refilled] losartan 50 mg oral tablet [take 1 tablet (50 mg) by oral route daily], #30 (thirty) tablets, Refills: 5 (five)       [Refilled] metoprolol succinate 100 mg oral Tablet, Extended Release 24 hr [1 tab daily], #90 (ninety) tablets, Refills: 1 (one)         Long term (current) use of  anticoagulantsPt to continue taking medication. Notify clinic with any acute concerns or issues. Keep scheduled INR visit in May. Pt v/u and had no further questions.           Prescriptions:       [Refilled] warfarin 5 mg oral tablet [TAKE 5MG BY MOUTH WITH 1MG TABLET FOR TOTAL OF 6MG ON MONDAY, WEDNESDAY AND FRIDAY AND THEN TAKE 6.5MG REST OF WEEK], #90 (ninety) each, Refills: 0 (zero)       [Refilled] warfarin 1 mg oral tablet [6mg MWF, 6.5mg rest], #90 (ninety) tablets, Refills: 0 (zero)         Obstructive sleep apnea (adult) (pediatric)Pt states he was getting off phone and would call company for nasal mask. Encouraged pt to do so.     Telehealth: Verbal consent obtained for visit to occur via phone call; Staff, other than provider, present during telephone visit include Angelika Harp MA; Total time spent was 6 minutes; 54061--Ahnvqepqx E/M 5-10 minutes             Charge Capture:         Primary Diagnosis:     E78.2  HLD - Mixed hyperlipidemia     I10  HTN - Essential (primary) hypertension     Z79.01  Long term (current) use of anticoagulants     G47.33  Obstructive sleep apnea (adult) (pediatric)           Orders:      01077  Phys/QHP telephone evaluation 5-10 min  (In-House)

## 2021-05-18 NOTE — PROGRESS NOTES
Freedom Stevens 1965     Office/Outpatient Visit    Visit Date: Wed, Apr 24, 2019 08:28 am    Provider: Isabella Barber MD (Assistant: Angelika Coker MA)    Location: [facility]        Electronically signed by Isabella Barber MD on  04/24/2019 12:37:43 PM                             SUBJECTIVE:        CC:     Toro is a 54 year old White male.  Patient presents today for six month follow up. (not taking Zolpidem)         HPI: Toro is here to f/u on chronic issues.        He is on metoprolol succinate and losartan for HTN.  BP has been well controlled.  No CP, palpitations, SOB.  He is on pravastatin for HLD.  No myalgias.  He is on warfarin for anticoagulation for a mechanical heart valve.  He follows with his cardiologist Dr. Tellez who manages the INR.  However, they would like us to take that over.  He will just see Cardiology once a year from here on out.        He follows with Dr. Mccauley for bipolar disorder with depression and anxiety.  He is currently on lithium, risperidone, pramipexole, and temazepam through Dr. Mccauley.        He has DORIAN but cannot tolerate CPAP.     ROS:     CONSTITUTIONAL:  Negative for fatigue and fever.      EYES:  Negative for blurred vision.      E/N/T:  Negative for diminished hearing and nasal congestion.      CARDIOVASCULAR:  Negative for chest pain and palpitations.      RESPIRATORY:  Negative for recent cough and dyspnea.      MUSCULOSKELETAL:  Negative for arthralgias and myalgias.      PSYCHIATRIC:  Positive for depression, anhedonia, difficulty concentrating and sleep disturbance.   Negative for anxiety, crying spells or suicidal thoughts.          PMH/FMH/SH:     Last Reviewed on 4/24/2019 08:36 AM by Isabella Barber    Past Medical History:             PAST MEDICAL HISTORY         Hyperlipidemia    Hypertension    congenital aortic stenosis s/p mechanical AVR     Sleep Apnea     Erectile Dysfunction     Anxiety    Bipolar Disorder          PREVENTIVE HEALTH MAINTENANCE             COLORECTAL CANCER SCREENING: Up to date (colonoscopy q10y; sigmoidoscopy q5y; Cologuard q3y) was last done 2014, Results are in chart; colonoscopy with normal results; The next colonoscopy is due  2024; Dr. Reed     PSA: was last done 2018 with normal results         Surgical History:         Cholecystectomy      Tonsillectomy    Vasectomy      mechanical aortic valve repair;    R ear surgery;    ascending aortic dilatation;         Family History:         Brother(s): aortic stenosis     Paternal Grandfather: Coronary Artery Disease     Maternal Grandfather: Coronary Artery Disease         Social History:     Occupation:    Disabled. previously worked for [employer];     Marital Status:      Children: 3 children daughter           Tobacco/Alcohol/Supplements:     Last Reviewed on 2019 08:36 AM by Isabella Barber    Tobacco: He has never smoked.          Substance Abuse History:     Last Reviewed on 2019 08:36 AM by Isabella Barber        Mental Health History:     Last Reviewed on 2019 08:36 AM by Isabella Barber        Communicable Diseases (eg STDs):     Last Reviewed on 2019 08:36 AM by Isabella Barber            Current Problems:     Last Reviewed on 10/24/2018 08:57 AM by Isabella Barber    Abnormal odor of urine     Cellulitis of the arm     Neoplasm of uncertain behavior: oral cavity     Artificial heart valve replacement     Obstructive sleep apnea (adult) (pediatric)     Vitamin D deficiency, unspecified     Bipolar disorder     Hypertension     Hyperlipidemia     Anxiety     Anticoagulation followup         Immunizations:     Hep A, adult dose 10/24/2018     zzFluzone pf-quadrivalent 3 and up 2016     Fluzone (3 + years dose) 2017     Fluzone Quadrivalent (3+ years) 10/5/2018     Tdap (Tetanus, reduced diph, acellular pertussis) 10/24/2018         Allergies:     Last Reviewed on 10/24/2018 08:57 AM by Sunil  Isabella      No Known Drug Allergies.         Current Medications:     Last Reviewed on 10/24/2018 08:57 AM by Isabella Barber    Losartan 25mg Tablet Take 1 tablet(s) by mouth daily     Pravastatin 20mg Tablet 1 tab q day hs     Warfarin Sodium 2mg Tablet as directed     Metoprolol 100mg Tablets, Extended Release 1 tab daily     Lithium Carbonate 450mg Tablets, Extended Release 2 tabs daily     Lorazepam 1mg Tablet 1 tab tid     Pramipexole 0.25mg Tablet one bid     Risperidone 2mg Tablet ONE TABLET AT BEDTIME     Warfarin Sodium 5mg Tablet Take one tablet with 2mg tablet to equal 7mg     Zolpidem Tartrate 10mg Tablet 1 tab daily HS         OBJECTIVE:        Vitals:         Current: 4/24/2019 8:33:40 AM    Ht:  5 ft, 11.5 in;  Wt: 276 lbs;  BMI: 38.0    T: 97.6 F (oral);  BP: 122/49 mm Hg (left arm, sitting);  P: 61 bpm (left arm (BP Cuff), sitting);  sCr: 1.03 mg/dL;  GFR: 95.48        Exams:     PHYSICAL EXAM:     GENERAL: Vitals recorded well developed, well nourished;  well groomed;  no apparent distress;     EYES: extraocular movements intact; conjunctiva and cornea are normal; PERRL;     E/N/T: OROPHARYNX:  normal mucosa, dentition, gingiva, and posterior pharynx;     RESPIRATORY: normal respiratory rate and pattern with no distress; normal breath sounds with no rales, rhonchi, wheezes or rubs;     CARDIOVASCULAR: normal rate; rhythm is regular;  no systolic murmur;     GASTROINTESTINAL: nontender; normal bowel sounds;     MUSCULOSKELETAL: normal gait; normal overall tone     PSYCHIATRIC: affect/demeanor: flat;  normal psychomotor function; normal speech pattern;         Lab/Test Results:             Coumadin (text dose):  6.5mg daily/ pdr (04/24/2019),     INR:  3.8 (04/24/2019),             ASSESSMENT           401.1   I10  Hypertension              DDx:     272.4   E78.2  Hyperlipidemia              DDx:     V43.3   Z95.2  Artificial heart valve replacement              DDx:     296.7   F31.9  Bipolar  disorder              DDx:     327.23   G47.33  Obstructive sleep apnea (adult) (pediatric)              DDx:     V76.44   Z12.5  Screening for prostate cancer              DDx:         ORDERS:         Radiology/Test Orders:       3017F  Colorectal CA screen results documented and reviewed (PV)  (In-House)           Lab Orders:       [id number]  HTN - [facility] CMP AND LIPID: [id number], [id number]  (Send-Out)         APPTO  Appointment need  (In-House)         *  [employer] Medicare screening PSA  (Send-Out)         [id number]  Prothrombin time  (In-House)                   PLAN:          Hypertension BP at goal.  No refills needed today.  Checking labs.  RTC 6 months.     LABORATORY:  Labs ordered to be performed today include HTN/Lipid Panel: CMP, Lipid.  MIPS Vaccines Flu and Pneumonia updated in Shot record     COLORECTAL CANCER SCREENING: Results are in chart     FOLLOW-UP: Schedule a follow-up visit in 6 months..  annual physical 30 min with [place]           Orders:       [id number]  HTNLP - [facility] CMP AND LIPID: [id number], [id number]  (Send-Out)         3017F  Colorectal CA screen results documented and reviewed (PV)  (In-House)         APPTO  Appointment need  (In-House)            Hyperlipidemia Stable.  No refills needed.  Checking labs.          Artificial heart valve replacement Cards has been managing his INR but they have requested that I take over management of this, which I am willing to do.  Checking INR today.  He will need refills in a couple of weeks but not today.  He can call in for that when he is due.           Orders:       [id number]  Prothrombin time  (In-House)            Bipolar disorder Follows with Dr. Mccauley.          Obstructive sleep apnea (adult) (pediatric) Does not use CPAP.          Screening for prostate cancer     LABORATORY:  Labs ordered to be performed today include PSA.            Orders:       *  [employer] Medicare screening PSA  (Send-Out)               Patient Recommendations:        For  Hypertension:      Schedule a follow-up visit in 6 months.                APPOINTMENT INFORMATION:        Monday Tuesday Wednesday Thursday Friday Saturday Sunday            Time:___________________AM  PM   Date:_____________________             CHARGE CAPTURE           **Please note: ICD descriptions below are intended for billing purposes only and may not represent clinical diagnoses**        Primary Diagnosis:         401.1 Hypertension            I10    Essential (primary) hypertension              Orders:          [id number]   Office/outpatient visit; established patient, level 4  (In-House)             3017F   Colorectal CA screen results documented and reviewed (PV)  (In-House)             APPTO   Appointment need  (In-House)           272.4 Hyperlipidemia            E78.2    Mixed hyperlipidemia    V43.3 Artificial heart valve replacement            Z95.2    Presence of prosthetic heart valve              Orders:          [id number]   Prothrombin time  (In-House)           296.7 Bipolar disorder            F31.9    Bipolar disorder, unspecified    327.23 Obstructive sleep apnea (adult) (pediatric)            G47.33    Obstructive sleep apnea (adult) (pediatric)    V76.44 Screening for prostate cancer            Z12.5    Encounter for screening for malignant neoplasm of prostate

## 2021-05-18 NOTE — PROGRESS NOTES
Freedom Stevens 1965     Office/Outpatient Visit    Visit Date: Tue, May 7, 2019 10:20 am    Provider: Isabella Barber MD (Assistant: Angelika Coker MA)    Location: [facility]        Electronically signed by Isabella Barber MD on  2019 11:06:49 AM                             SUBJECTIVE:        CC:     Toro is a 54 year old White male.  Patient presents today with complaints of memory loss X years, but states he thinks it is getting worse         HPI: Toro is here today with concern for memory loss.  He is on a pretty complex psychiatric med regimen through Dr. Mccauley that includes temazepam, lithium, lorazepam, pramipexole and risperidone.   He is having trouble remembering names and dates.  He can't remember the date of his daughter's death (she  3 years ago).  He can't remember his dog's name right away, and they have had the dog for years.  He has not had any problems with driving.  Occasional word-finding difficulty.          His bipolar seems to be under good control.     ROS:     CONSTITUTIONAL:  Negative for fatigue and fever.      MUSCULOSKELETAL:  Negative for arthralgias and myalgias.      NEUROLOGICAL:  Positive for memory loss.   Negative for paresthesias or weakness.          PMH/FMH/SH:     Last Reviewed on 2019 10:47 AM by Isabella Barber    Past Medical History:             PAST MEDICAL HISTORY         Hyperlipidemia    Hypertension    congenital aortic stenosis s/p mechanical AVR     Sleep Apnea     Erectile Dysfunction     Anxiety    Bipolar Disorder         PREVENTIVE HEALTH MAINTENANCE             COLORECTAL CANCER SCREENING: Up to date (colonoscopy q10y; sigmoidoscopy q5y; Cologuard q3y) was last done 2014, Results are in chart; colonoscopy with normal results; The next colonoscopy is due  2024; Dr. Reed     PSA: was last done 2018 with normal results         Surgical History:         Cholecystectomy      Tonsillectomy    Vasectomy       mechanical aortic valve repair;    R ear surgery;    ascending aortic dilatation;         Family History:         Brother(s): aortic stenosis     Paternal Grandfather: Coronary Artery Disease     Maternal Grandfather: Coronary Artery Disease         Social History:     Occupation:    Disabled. previously worked for [employer];     Marital Status:      Children: 3 children daughter           Tobacco/Alcohol/Supplements:     Last Reviewed on 2019 10:47 AM by Isabella Barber    Tobacco: He has never smoked.          Substance Abuse History:     Last Reviewed on 2019 10:47 AM by Isabella Barber        Mental Health History:     Last Reviewed on 2019 10:47 AM by Isabella Barber        Communicable Diseases (eg STDs):     Last Reviewed on 2019 10:47 AM by Isabella Barber            Current Problems:     Last Reviewed on 2019 08:36 AM by Isabella Barber    Neoplasm of uncertain behavior: oral cavity     Artificial heart valve replacement     Obstructive sleep apnea (adult) (pediatric)     Vitamin D deficiency, unspecified     Bipolar disorder     Hypertension     Hyperlipidemia     Anxiety     Screening for prostate cancer     Anticoagulation followup         Immunizations:     Hep A, adult dose 10/24/2018     Hep A, adult dose 2019     zzFluzone pf-quadrivalent 3 and up 2016     Fluzone (3 + years dose) 2017     Fluzone Quadrivalent (3+ years) 10/5/2018     Tdap (Tetanus, reduced diph, acellular pertussis) 10/24/2018         Allergies:     Last Reviewed on 2019 08:36 AM by Isabella Barber      No Known Drug Allergies.         Current Medications:     Last Reviewed on 2019 08:36 AM by Isabella Barber    Losartan 25mg Tablet Take 1 tablet(s) by mouth daily     Pravastatin 20mg Tablet 1 tab q day hs     Temazepam 30mg Capsules One PO Q HS     Warfarin Sodium 1mg Tablet take 1 and  1/2 tab with 5mg tab for 6.5mg daily     Metoprolol 100mg Tablets, Extended Release 1  tab daily     Lithium Carbonate 450mg Tablets, Extended Release 2 tabs daily     Lorazepam 1mg Tablet 1 tab tid     Pramipexole 0.25mg Tablet one bid     Risperidone 2mg Tablet ONE TABLET AT BEDTIME     Warfarin Sodium 5mg Tablet take 5mg tab with 1 and 1/2tabs of 1mg for 6.5mg daily         OBJECTIVE:        Vitals:         Current: 5/7/2019 10:23:59 AM    Ht:  5 ft, 11.5 in;  Wt: 276.4 lbs;  BMI: 38.0    T: 98.8 F (oral);  BP: 148/82 mm Hg (right arm, sitting);  P: 62 bpm (right arm (BP Cuff), sitting);  sCr: 0.91 mg/dL;  GFR: 108.14        Exams:     PHYSICAL EXAM:     GENERAL: Vitals recorded well developed, well nourished;  well groomed;  no apparent distress;     RESPIRATORY: normal respiratory rate and pattern with no distress; normal breath sounds with no rales, rhonchi, wheezes or rubs;     MUSCULOSKELETAL: normal gait; normal overall tone     NEUROLOGIC: mental status: alert and oriented x 3; MOCA 24/30 (scanned);     PSYCHIATRIC: affect/demeanor: flat;  normal psychomotor function; normal speech pattern;         ASSESSMENT           780.93   R41.89  Memory loss              DDx:         ORDERS:         Procedures Ordered:       REFER  Referral to Specialist or Other Facility  (Send-Out)                   PLAN:          Memory loss +Memory loss with significant functional ramifications.  MOCA today tested out at 24/30 (scanned).  Will get him set up for Neuropsych testing as he could very well be having problems with his psych med regimen ( particularly the benzos) vs his bipolar d/o vs organic memory issues.          REFERRALS:  Referral initiated to Neuropsych testing in [facility] --  memory loss.            Orders:       REFER  Referral to Specialist or Other Facility  (Send-Out)               Patient Recommendations:        For  Memory loss:     [facility]  [facility]              CHARGE CAPTURE           **Please note: ICD descriptions below are intended for  billing purposes only and may not represent clinical diagnoses**        Primary Diagnosis:         780.93 Memory loss            R41.89    Other symptoms and signs involving cognitive functions and awareness              Orders:          [id number]   Office/outpatient visit; established patient, level 3  (In-House)

## 2021-05-18 NOTE — PROGRESS NOTES
Freedom Stevens 1965     Office/Outpatient Visit    Visit Date: Tue, Jul 24, 2018 04:27 pm    Provider: Isabella Barber MD (Assistant: Marcelle Cuello MA)    Location: [facility]        Electronically signed by Isabella Barber MD on  07/24/2018 07:39:36 PM                             SUBJECTIVE:        CC:     Toro is a 53 year old White male.  pt's mother in law passed away & he is depressed         HPI: Toro is here today for routine follow up on  chronic issues.        He is on metoprolol succinate and losartan for HTN.  BP has been well controlled.  No CP, palpitations, SOB.  He is on pravastatin for cholesterol.  He is on warfarin for anticoagulation for his mechanical heart valve.  He follows with his cardiologist Dr. Browne who manages the INR.        He follows with Dr. Mccauley for bipolar disorder with depression and anxiety.  He is currently on lithium, risperidone, pramipexole, lorazepam, and zolpidem through Dr. Mccauley.  His mother-in-law Greta Souza passed away unexpectedly of a heart attack earlier this summer.  He was very close to her and has had a hard time grieving her loss.  He did see Dr. Mccauley about 2 weeks after it happened, and Dr. Mccauley made some adjustments to the medications.  Toro felt like he was already going down into a depressed spell even before her death.  +Depression and anhedonia, +fatigue.  Poor concentration.  He denies suicidal ideation.        He has DORIAN, noncompliant with CPAP.         Patient to be evaluated for screening for depression.          PHQ-9 Depression Screening: Completed form scanned and in chart; Total Score 22 Alcohol Consumption Screening: Completed form scanned and in chart; Total Score 0     ROS:     CONSTITUTIONAL:  Negative for fatigue and fever.      EYES:  Negative for blurred vision.      E/N/T:  Negative for diminished hearing and nasal congestion.      CARDIOVASCULAR:  Negative for chest pain and  palpitations.      RESPIRATORY:  Negative for recent cough and dyspnea.      MUSCULOSKELETAL:  Negative for arthralgias and myalgias.      PSYCHIATRIC:  Positive for crying spells, depression, anhedonia, difficulty concentrating and sleep disturbance.   Negative for anxiety or suicidal thoughts.          PMH/FMH/SH:     Last Reviewed on 2018 04:57 PM by Isabella Barber    Past Medical History:             PAST MEDICAL HISTORY         Hyperlipidemia    Hypertension    congenital aortic stenosis s/p mechanical AVR     Sleep Apnea     Erectile Dysfunction     Anxiety    Bipolar Disorder         PREVENTIVE HEALTH MAINTENANCE             COLORECTAL CANCER SCREENING: Up to date (colonoscopy q10y; sigmoidoscopy q5y; Cologuard q3y) was last done 2014, Results are in chart; colonoscopy with normal results; The next colonoscopy is due  2024; Dr. Reed     PSA: was last done 2018 with normal results         Surgical History:         Cholecystectomy      Tonsillectomy    Vasectomy      mechanical aortic valve repair;    R ear surgery;    ascending aortic dilatation;         Family History:         Brother(s): aortic stenosis     Paternal Grandfather: Coronary Artery Disease     Maternal Grandfather: Coronary Artery Disease         Social History:     Occupation:    Disabled. previously worked for [employer];     Marital Status:      Children: 3 children daughter           Tobacco/Alcohol/Supplements:     Last Reviewed on 2018 04:57 PM by Isabella Barber    Tobacco: He has never smoked.          Substance Abuse History:     Last Reviewed on 2018 04:57 PM by Isabella Barber        Mental Health History:     Last Reviewed on 2018 04:57 PM by Isabella Barber        Communicable Diseases (eg STDs):     Last Reviewed on 2018 04:57 PM by Isabella Barber            Current Problems:     Last Reviewed on 2018 10:51 AM by Xavier Lanza    Cellulitis of the arm     Neoplasm of  uncertain behavior: oral cavity     Artificial heart valve replacement     Obstructive sleep apnea (adult) (pediatric)     Vitamin D deficiency, unspecified     Bipolar disorder     Hypertension     Hyperlipidemia     Anxiety     Anticoagulation followup         Immunizations:     zzFluzone pf-quadrivalent 3 and up 12/9/2016     Fluzone (3 + years dose) 9/27/2017         Allergies:     Last Reviewed on 1/08/2018 10:51 AM by Xavier Lanza      No Known Drug Allergies.         Current Medications:     Last Reviewed on 1/08/2018 10:51 AM by Xavier Lanza    Pravastatin 20mg Tablet 1 tab q day hs     Lisinopril 10mg Tablet 1 tab daily     Warfarin Sodium 2mg Tablet as directed     Metoprolol 100mg Tablets, Extended Release 1 tab daily     Diphenhydramine HCl 25mg Capsules 1 capsules Q HS     Lithium Carbonate 450mg Tablets, Extended Release 2 tabs daily     Lorazepam 1mg Tablet 1 tab tid     Pramipexole 0.25mg Tablet one bid     Risperidone 2mg Tablet ONE TABLET AT BEDTIME     Warfarin Sodium 5mg Tablet Take one tablet with 2mg tablet to equal 7mg     Zolpidem Tartrate 10mg Tablet 1 tab daily HS         OBJECTIVE:        Vitals:         Current: 7/24/2018 4:29:45 PM    Ht:  5 ft, 11.5 in;  Wt: 280.6 lbs;  BMI: 38.6    T: 97.3 F (oral);  BP: 144/79 mm Hg (left arm, sitting);  P: 53 bpm (left arm (BP Cuff), sitting);  sCr: 1.06 mg/dL;  GFR: 94.50        Repeat:     5:20:58 PM     BP:   116/70mm Hg (left arm, sitting)         Exams:     PHYSICAL EXAM:     GENERAL: Vitals recorded well developed, well nourished;  well groomed;  no apparent distress;     EYES: extraocular movements intact; conjunctiva and cornea are normal; PERRL;     E/N/T: OROPHARYNX:  normal mucosa, dentition, gingiva, and posterior pharynx;     RESPIRATORY: normal respiratory rate and pattern with no distress; normal breath sounds with no rales, rhonchi, wheezes or rubs;     CARDIOVASCULAR: normal rate; rhythm is regular;  no systolic  murmur;     GASTROINTESTINAL: nontender; normal bowel sounds;     MUSCULOSKELETAL: normal gait; normal overall tone     PSYCHIATRIC: affect/demeanor: flat;  normal psychomotor function; normal speech pattern; thought/perception: denies suicidal ideation;         ASSESSMENT           296.7   F31.9  Bipolar disorder              DDx:     401.1   I10  Hypertension              DDx:     272.4   E78.2  Hyperlipidemia              DDx:     V43.3   Z95.2  Artificial heart valve replacement              DDx:     V79.0   Z13.89  Screening for depression              DDx:         ORDERS:         Meds Prescribed:       Refill of: Pravastatin 20mg Tablet 1 tab q day hs  #90 (Ninety) tablet(s) Refills: 1       Refill of: Losartan 25mg Tablet Take 1 tablet(s) by mouth daily  #90 (Ninety) tablet(s) Refills: 1         Lab Orders:       APPTO  Appointment need  (In-House)         [id number]  [facility] Basic Metabolic Panel  (Send-Out)                   PLAN:          Bipolar disorder Follows with Dr. Mccauley for his medications.  He is currently depressed, exacerbated by the loss of his MIL with whom he was quite close.  We talked about her for quite some time today, as she was also my patient.  He has not looked into grief counseling, and I suggested that he try this, either through his [facility] or with one of our local therapists.  He prefers to go through his [facility] if possible, but if they do not have this resource, he will let me know and I will give him a list of names.  I would like to see him back in 3 months instead of 6.  He denies SI today and is not a suicide risk.      MIPS PHQ-9 Depression Screening: Completed form scanned and in chart; Total Score 22     FOLLOW-UP: Schedule a follow-up visit in 3 months..  annual physical 30 min with Sunil           Orders:       APPTO  Appointment need  (In-House)             Patient Education Handouts:       [facility] Medication Compliance           Hypertension BP has been running at  goal.  He follows with Cards.  Refills sent as below.  Checking labs.      LABORATORY:  Labs ordered to be performed today include basic metabolic panel.            Prescriptions:       Refill of: Losartan 25mg Tablet Take 1 tablet(s) by mouth daily  #90 (Ninety) tablet(s) Refills: 1           Orders:       [id number]  [facility] Basic Metabolic Panel  (Send-Out)            Hyperlipidemia Refills sent as below.  Checking labs.            Prescriptions:       Refill of: Pravastatin 20mg Tablet 1 tab q day hs  #90 (Ninety) tablet(s) Refills: 1          Artificial heart valve replacement On Coumadin.  INR managed by his cardiologist Dr. Goldstein.          Screening for depression Score of 22, currently being treated for bipolar d/o by Dr. Mccauley.             Patient Recommendations:        For  Bipolar disorder:     Schedule a follow-up visit in 3 months.                APPOINTMENT INFORMATION:        Monday Tuesday Wednesday Thursday Friday Saturday Sunday            Time:___________________AM  PM   Date:_____________________             CHARGE CAPTURE           **Please note: ICD descriptions below are intended for billing purposes only and may not represent clinical diagnoses**        Primary Diagnosis:         296.7 Bipolar disorder            F31.9    Bipolar disorder, unspecified              Orders:          [id number]   Office/outpatient visit; established patient, level 4  (In-House)             APPTO   Appointment need  (In-House)           401.1 Hypertension            I10    Essential (primary) hypertension    272.4 Hyperlipidemia            E78.2    Mixed hyperlipidemia    V43.3 Artificial heart valve replacement            Z95.2    Presence of prosthetic heart valve    V79.0 Screening for depression            Z13.89    Encounter for screening for other disorder

## 2021-05-18 NOTE — PROGRESS NOTES
Hilda Freedom PAOLA  1965     Office/Outpatient Visit    Visit Date: Thu, Mar 11, 2021 09:45 am    Provider: Isabella Barber MD (Assistant: Angelika Coker MA)    Location: [facility]        Electronically signed by Isabella Barber MD on  03/11/2021 10:20:45 AM                             Subjective:        CC: Toro is a 55 year old White male.  Patient presents today for physical exam (states he is taking Warfarin 6mg every day)         HPI:       Toro is here today for his annual physical.  He is UTD on colonoscopy, last done 12/2014 and this was normal.  Ten year repeat recommended.  He is due for prostate cancer screening.  He is UTD on Tdap (10/2018) and flu (9/2019).  He is due for Shingrix.  He is due for routine labs including HTN panel and PSA.        He was diagnosed back in November with DM, A1c 6.7.  Started on metformin.  He is reportedly UTD on eye exam, last done 3 weeks ago.  He is due for foot exam.  He is on statin and ARB.        He is on metoprolol succinate and losartan for HTN.  BP has been well controlled.  No CP, palpitations, SOB.          He is on pravastatin for cholesterol.  No myalgias.          He is on warfarin for anticoagulation of a mechanical heart valve.  He follows with Dr. Geoff Gann once yearly.  We are managing the INR.        He follows with Dr. Mccauley for bipolar disorder with depression and anxiety.  He is on a complex regimen of lithium, lorazepam, risperidone, pramipexole, and temazepam.        He has DORIAN but is unable to tolerate CPAP.    ROS:     CONSTITUTIONAL:  Negative for fatigue and fever.      EYES:  Negative for blurred vision.      E/N/T:  Negative for diminished hearing and nasal congestion.      CARDIOVASCULAR:  Negative for chest pain and palpitations.      RESPIRATORY:  Negative for recent cough and dyspnea.      GASTROINTESTINAL:  Negative for abdominal pain, constipation, diarrhea, nausea and vomiting.      GENITOURINARY:   Positive for nocturia.   Negative for change in urine stream.      MUSCULOSKELETAL:  Negative for arthralgias and myalgias.      NEUROLOGICAL:  Negative for paresthesias and weakness.      PSYCHIATRIC:  Positive for depression, anhedonia, difficulty concentrating and sleep disturbance.   Negative for anxiety, crying spells or suicidal thoughts.          Past Medical History / Family History / Social History:         Last Reviewed on 3/11/2021 10:12 AM by Isabella Barber    Past Medical History:             PAST MEDICAL HISTORY         Hyperlipidemia    Hypertension    congenital aortic stenosis s/p mechanical AVR     Sleep Apnea     Erectile Dysfunction     Anxiety    Bipolar Disorder         PREVENTIVE HEALTH MAINTENANCE             COLORECTAL CANCER SCREENING: Up to date (colonoscopy q10y; sigmoidoscopy q5y; Cologuard q3y) was last done 2014, Results are in chart; colonoscopy with normal results; The next colonoscopy is due  2024; Dr. Reed     PSA: was last done 2020 with normal results         Surgical History:         Cholecystectomy    Tonsillectomy    Vasectomy     mechanical aortic valve repair;    R ear surgery;    ascending aortic dilatation;         Family History:         Brother(s): aortic stenosis     Paternal Grandfather: Coronary Artery Disease     Maternal Grandfather: Coronary Artery Disease Father:  at age 60s; Cause of death was nursing home , unsure cause     Mother:  at age 77; Cause of death was Dementia    ; Positive for Coronary Artery Disease;         Social History:     Occupation:    Disabled. previously worked for [employer];     Marital Status:      Children: 3 children daughter           Tobacco/Alcohol/Supplements:     Last Reviewed on 3/11/2021 10:12 AM by Isabella Barber    Tobacco: He has never smoked.          Substance Abuse History:     Last Reviewed on 3/11/2021 10:12 AM by Isabella Barber    None         Mental Health History:     Last  Reviewed on 3/11/2021 10:12 AM by Isabella Barber        Communicable Diseases (eg STDs):     Last Reviewed on 3/11/2021 10:12 AM by Isabella Barber        Current Problems:     Last Reviewed on 11/20/2020 01:17 PM by Radha Nicole    Bipolar disorder, unspecified    HTN - Essential (primary) hypertension    Obstructive sleep apnea (adult) (pediatric)    Presence of prosthetic heart valve    Neoplasm of uncertain behavior of pharynx    Long term (current) use of anticoagulants    Other symptoms and signs involving cognitive functions and awareness    Anxiety disorder, unspecified    Vitamin D deficiency, unspecified    Type 2 diabetes mellitus without complications    Low back pain        Immunizations:     influenza, injectable, quadrivalent, preservative free (FLUZONE QUAD 0011-2494) 9/10/2020    Hep A, adult dose 10/24/2018    Hep A, adult dose 5/6/2019    zzFluzone pf-quadrivalent 3 and up 12/9/2016    Fluzone (3 + years dose) 9/27/2017    Fluzone Quadrivalent (3+ years) 10/5/2018    Fluzone Quadrivalent (3+ years) 9/27/2019    Tdap (Tetanus, reduced diph, acellular pertussis) 10/24/2018        Allergies:     Last Reviewed on 3/11/2021 09:47 AM by Angelika Coker    shrimp:          Current Medications:     Last Reviewed on 3/11/2021 09:47 AM by Angelika Coker    Lorazepam 1 mg oral tablet [1 tab tid]    Risperidone 2 mg oral tablet [ONE TABLET AT BEDTIME]    Lithium Carbonate 450 mg oral tablet, extended release [2 tabs daily]    Pramipexole 0.25 mg oral tablet [one bid]    warfarin 1 mg oral tablet [TAKE 6MG BY MOUTH ONCE DAILY ON MONDAY WEDNESDAY AND FRIDAY AND TAKE 6.5MG ALL OTHER DAYS]    pravastatin 20 mg oral tablet [TAKE 1 TABLET BY MOUTH ONCE DAILY AT BEDTIME]    warfarin 5 mg oral tablet [6mg MWF, 6.5mg rest]    metoprolol succinate 100 mg oral Tablet, Extended Release 24 hr [1 tab daily]    Temazepam 30 mg oral capsule [One PO Q HS]    losartan 50 mg oral tablet [take 1 tablet (50 mg) by oral  route bid ]    pantoprazole 40 mg oral tablet, delayed release (enteric coated) [Take 1 tablet by mouth once daily]    sucralfate 1 gram oral tablet [take 1 tablet (1 gram) by oral route 2 times per day x 10 days on an empty stomach]    metFORMIN 500 mg oral tablet [take 1 tablet (500 mg) by oral route daily]    amLODIPine 2.5 mg oral tablet [take 1 tablet (2.5 mg) by oral route once daily]    amLODIPine 5 mg oral tablet [take 1 tablet (5 mg) by oral route once daily]        Objective:        Vitals:         Current: 3/11/2021 9:48:25 AM    Ht:  5 ft, 11.5 in;  Wt: 280.8 lbs;  BMI: 38.6T: 97.4 F (temporal);  BP: 139/66 mm Hg (left arm, sitting);  P: 63 bpm (left arm (BP Cuff), sitting);  sCr: 1.1 mg/dL;  GFR: 89.04        Exams:     PHYSICAL EXAM:     GENERAL: Vitals recorded well developed, well nourished;  well groomed;  no apparent distress;     EYES: extraocular movements intact; conjunctiva and cornea are normal; PERRL;     RESPIRATORY: normal respiratory rate and pattern with no distress; normal breath sounds with no rales, rhonchi, wheezes or rubs;     CARDIOVASCULAR: normal rate; rhythm is regular;  (+) mid- systolic click; no systolic murmur;     GASTROINTESTINAL: nontender; normal bowel sounds;     GENITOURINARY: prostate:  no nodules, tenderness, or enlargement; Chaperone: DECLINES     SKIN: a rash is noted medial R upper arm;  the color is mainly red;  it is best characterized as papular;     MUSCULOSKELETAL: normal gait; normal overall tone     PSYCHIATRIC: affect/demeanor: flat;  normal psychomotor function; normal speech pattern;         Assessment:         Z00.00   Encounter for general adult medical examination without abnormal findings       E11.9   Type 2 diabetes mellitus without complications       I10   HTN - Essential (primary) hypertension       F31.9   Bipolar disorder, unspecified       G47.33   Obstructive sleep apnea (adult) (pediatric)       Z95.2   Presence of prosthetic heart valve            ORDERS:         Meds Prescribed:       [Refilled] metFORMIN 500 mg oral tablet [take 1 tablet (500 mg) by oral route daily], #90 (ninety) tablets, Refills: 1 (one)         Radiology/Test Orders:       [id number]  Colorectal CA screen results documented and reviewed (PV)  (In-House)              Lab Orders:       [id number]  DIAB1 - [facility] LIPID,CMP, A1C: 37579, 18370, 70850  (Send-Out)            [id number]  EMILIANA - [facility] Microablbumin, quantitative  (Send-Out)            APPTO  Appointment need  (In-House)            *  [employer] Medicare screening PSA  (Send-Out)              Other Orders:         Depression screen positive and follow up plan documented  (In-House)                      Plan:         Encounter for general adult medical examination without abnormal findingsDean is here today for his annual physical.  He is UTD on colonoscopy, last done 12/2014 and this was normal.  Ten year repeat recommended.  He is due for prostate cancer screening; PSA ordered, OMKAR normal.  He is UTD on Tdap (10/2018) and flu (9/2019).  He is due for Shingrix; can get at pharmacy.  COVID to be done ASAP.  He is due for routine labs including HTN panel and PSA; ordered.  RTC 6 months.    LABORATORY:  Labs ordered to be performed today include PSA.  [facility] PHQ-9 Depression Screening: Completed form scanned and in chart; Total Score 14 Positive Depression Screen: follows with Dr. Mccauley     FOLLOW-UP: Schedule a follow-up visit in 6 months.:.  f/u DM with [facility]          Orders:         Depression screen positive and follow up plan documented  (In-House)            3017F  Colorectal CA screen results documented and reviewed (PV)  (In-House)            APPTO  Appointment need  (In-House)            *  [employer] Medicare screening PSA  (Send-Out)              Type 2 diabetes mellitus without complications    LABORATORY:  Labs ordered to be performed today include Diabetes Panel 1; CMP, Lipid, A1C and Microalbumin.             Prescriptions:       [Refilled] metFORMIN 500 mg oral tablet [take 1 tablet (500 mg) by oral route daily], #90 (ninety) tablets, Refills: 1 (one)           Orders:       [id number]  DIAB1 - [facility] LIPID,CMP, A1C: 41944, 59381, 05109  (Send-Out)            [id number]  EMILIANA [facility] Microablbumin, quantitative  (Send-Out)              HTN - Essential (primary) hypertensionChecking labs.        Bipolar disorder, unspecifiedFollows with Dr. Mccauley.        Obstructive sleep apnea (adult) (pediatric)Has not been using his CPAP.        Presence of prosthetic heart valveOn anticoagulation.            Patient Recommendations:        For  Encounter for general adult medical examination without abnormal findings:    Schedule a follow-up visit in 6 months.                APPOINTMENT INFORMATION:        Monday Tuesday Wednesday Thursday Friday Saturday Sunday            Time:___________________AM  PM   Date:_____________________             Charge Capture:         Primary Diagnosis:     Z00.00  Encounter for general adult medical examination without abnormal findings           Orders:      [id number]  Preventive medicine, established patient, age 40-64 years  (In-House)              Depression screen positive and follow up plan documented  (In-House)            3017F  Colorectal CA screen results documented and reviewed (PV)  (In-House)            APPTO  Appointment need  (In-House)              E11.9  Type 2 diabetes mellitus without complications     I10  HTN - Essential (primary) hypertension     F31.9  Bipolar disorder, unspecified     G47.33  Obstructive sleep apnea (adult) (pediatric)     Z95.2  Presence of prosthetic heart valve

## 2021-05-18 NOTE — PROGRESS NOTES
Freedom Stevens 1965     Office/Outpatient Visit    Visit Date: Mon, Jan 8, 2018 10:38 am    Provider: Xavier Lanza MD (Assistant: Andra Valencia MA)    Location: [facility]        Electronically signed by Xavier Lanza MD on  01/08/2018 07:44:56 PM                             SUBJECTIVE:        CC: possible flu         HPI:     Toro is in today for follow up on fever and aches.  He started getting sick on Friday, but he really felt bad on Saturday.  He says that the main symptoms have been sore throat and 'bone ache'.  He is coughing some.  He was actually worried over the weekend about strep.  He says that the uvula seemed swollen.  He does have a somewhat complicated medical history primarily related to the heart issues that he has.        It is of note that Toro does have a strong family history of prostate cancer.  He has not had any recent evaluation of the prostate of which I am aware.     ROS:     CONSTITUTIONAL:  Positive for body aches.   Negative for chills or fever.      CARDIOVASCULAR:  Negative for chest pain and palpitations.      GASTROINTESTINAL:  Negative for abdominal pain, nausea and vomiting.      INTEGUMENTARY:  Negative for atypical mole(s) and rash.          [facility]/:     Last Reviewed on 1/08/2018 10:51 AM by Xavier Lanza    Past Medical History:             PAST MEDICAL HISTORY         Hyperlipidemia    Hypertension    congenital aortic stenosis s/p mechanical AVR     Sleep Apnea     Erectile Dysfunction     Anxiety    Bipolar Disorder         PREVENTIVE HEALTH MAINTENANCE             COLORECTAL CANCER SCREENING: Up to date (colonoscopy q10y; sigmoidoscopy q5y; Cologuard q3y) was last done 12/2014, Results are in chart; colonoscopy with normal results; The next colonoscopy is due  12/2024; Dr. Reed         Surgical History:         Cholecystectomy      Tonsillectomy    Vasectomy      mechanical aortic valve repair;    R ear surgery;    ascending  aortic dilatation;         Family History:         Brother(s): aortic stenosis     Paternal Grandfather: Coronary Artery Disease     Maternal Grandfather: Coronary Artery Disease         Social History:     Occupation:    Disabled. previously worked for [employer];     Marital Status:      Children: 3 children daughter           Tobacco/Alcohol/Supplements:     Last Reviewed on 2018 10:51 AM by Xavier Lanza    Tobacco: He has never smoked.          Substance Abuse History:     Last Reviewed on 2018 10:51 AM by Xavier Lanza        Mental Health History:     Last Reviewed on 2018 10:51 AM by Xavier Lanza        Communicable Diseases (eg STDs):     Last Reviewed on 2018 10:51 AM by Xavier Lanza            Current Problems:     Last Reviewed on 2018 10:51 AM by Xavier Lanza    Cellulitis of the arm     Neoplasm of uncertain behavior: oral cavity     Artificial heart valve replacement     Obstructive sleep apnea (adult) (pediatric)     Vitamin D deficiency, unspecified     Bipolar disorder     Hypertension     Hyperlipidemia     Anxiety     Screening for prostate cancer     Cough     Anticoagulation followup         Immunizations:     Fluzone pf-quadrivalent 3 and up 2016     Fluzone (3 + years dose) 2017         Allergies:     Last Reviewed on 2018 10:51 AM by Xavier Lanza      No Known Drug Allergies.         Current Medications:     Last Reviewed on 2018 10:51 AM by Xavier Lanza    Pravastatin 20mg Tablet 1 tab q day hs     Lisinopril 10mg Tablet 1 tab daily     Warfarin Sodium 2mg Tablet as directed     Metoprolol 100mg Tablets, Extended Release 1 tab daily     Diphenhydramine HCl 25mg Capsules 1 capsules Q HS     Lithium Carbonate 450mg Tablets, Extended Release 2 tabs daily     Lorazepam 1mg Tablet 1 tab tid     Pramipexole 0.25mg Tablet one bid     Risperidone 2mg Tablet ONE TABLET AT  BEDTIME     Warfarin Sodium 5mg Tablet Take one tablet with 2mg tablet to equal 7mg     Zolpidem Tartrate 10mg Tablet 1 tab daily HS         OBJECTIVE:        Vitals:         Current: 1/8/2018 10:41:29 AM    Ht:  5 ft, 11.5 in;  Wt: 278.6 lbs;  BMI: 38.3    T: 98.9 F (oral);  BP: 164/88 mm Hg (left arm, sitting);  P: 65 bpm (left arm (BP Cuff), sitting);  sCr: 0.99 mg/dL;  GFR: 102.02        Exams:     PHYSICAL EXAM:     GENERAL: Vitals recorded well developed, well nourished;  tired-appearing;     EYES: extraocular movements intact; conjunctiva and cornea are normal; PERRL;     E/N/T: EARS:  normal external auditory canals and tympanic membranes;  grossly normal hearing; OROPHARYNX: posterior pharynx shows erythematous tonsils, erythematous uvula, and edematous uvula;     NECK: range of motion is normal; thyroid is non-palpable;     RESPIRATORY: normal respiratory rate and pattern with no distress; normal breath sounds with no rales, rhonchi, wheezes or rubs;     CARDIOVASCULAR: normal rate; rhythm is regular;  no systolic murmur;     GASTROINTESTINAL: nontender; normal bowel sounds; no masses;     SKIN:  no significant rashes or lesions; no suspicious moles;         Lab/Test Results:             Rapid Strep Screen:  Negative (01/08/2018),     Performed by::  fanta (01/08/2018),     Influenza A:  Positive (01/08/2018),     Influenza B:  Negative (01/08/2018),             ASSESSMENT           786.2   R05  Cough              DDx:     V76.44   Z12.5  Screening for prostate cancer              DDx:     272.4   E78.2  Hyperlipidemia              DDx:     401.1   I10  Hypertension              DDx:     V43.3   Z95.2  Artificial heart valve replacement              DDx:     V58.61   Z79.01  Anticoagulation followup              DDx:         ORDERS:         Meds Prescribed:       Tamiflu (Oseltamivir) 75mg Capsules Take 1 capsule(s) by mouth bid for 5 days  #10 (Ten) capsule(s) Refills: 0         Lab Orders:       [id number]   Infectious agent antigen detection by immunoassay; Influenza  (In-House)         62070-92  Infectious agent antigen detection by immunoassay; Influenza  (In-House)         72452  [id number] [facility] Prostate-Specific Ag, Serum  (Send-Out)         00201  [id number] [facility]  (Send-Out)         40975  [id number] [facility] Lipid Panel (Excludes LDL/HDL ratio)  (Send-Out)         61565  [id number] [facility] CBC without diff  (Send-Out)         29656  [id number] [facility] Prothrombin Time (PT)  (Send-Out)  ([facility] DO THIS PLEASE)       08262  [facility] Rapid strep A  (In-House)         83991  [facility] Throat culture, strep  (Send-Out)                   PLAN:          Cough    LABORATORY:  Labs ordered to be performed today include rapid strep test.      RECOMMENDATIONS given include: Toro has the flu.  We will cover him as note for this and follow from there.  We have also reviewed Toro's other care.  I'm going to recommend some blood work at this time including PSA.  We will see what that testing shows and be back in touch with him..            Prescriptions:       Tamiflu (Oseltamivir) 75mg Capsules Take 1 capsule(s) by mouth bid for 5 days  #10 (Ten) capsule(s) Refills: 0           Orders:       [id number]  Infectious agent antigen detection by immunoassay; Influenza  (In-House)         46065-41  Infectious agent antigen detection by immunoassay; Influenza  (In-House)         34722  [facility] Rapid strep A  (In-House)         39551  [facility] Throat culture, strep  (Send-Out)             Patient Education Handouts:       Influenza           Screening for prostate cancer     LABORATORY:  Labs ordered to be performed today at [facility] include PSA.            Orders:       [id number]  [id number] [facility] Prostate-Specific Ag, Serum  (Send-Out)            Hyperlipidemia     LABORATORY:  Labs ordered to be performed today at [facility] include.  CMP Lipid panel           Orders:       [id number]  [id number] [facility]14[facility]   (Send-Out)         37624  [id number] [facility] Lipid Panel (Excludes LDL/HDL ratio)  (Send-Out)            Hypertension As above.          Artificial heart valve replacement     LABORATORY:  Labs ordered to be performed today at [facility] include CBC W/O DIFF and INR.            Orders:       [id number]  [id number] [facility] CBC without diff  (Send-Out)         45983  [id number] [facility] Prothrombin Time (PT)  (Send-Out)  ([facility] DO THIS PLEASE)          Anticoagulation followup As above.             CHARGE CAPTURE           **Please note: ICD descriptions below are intended for billing purposes only and may not represent clinical diagnoses**        Primary Diagnosis:         786.2 Cough            R05    Cough              Orders:          [id number]   Office/outpatient visit; established patient, level 4  (In-House)             78061   Infectious agent antigen detection by immunoassay; Influenza  (In-House)             65772 -59  Infectious agent antigen detection by immunoassay; Influenza  (In-House)             77420   [facility] - [facility] Rapid strep A  (In-House)           V76.44 Screening for prostate cancer            Z12.5    Encounter for screening for malignant neoplasm of prostate    272.4 Hyperlipidemia            E78.2    Mixed hyperlipidemia    401.1 Hypertension            I10    Essential (primary) hypertension    V43.3 Artificial heart valve replacement            Z95.2    Presence of prosthetic heart valve    V58.61 Anticoagulation followup            Z79.01    Long term (current) use of anticoagulants

## 2021-05-18 NOTE — PROGRESS NOTES
Freedom Stevens  1965     Office/Outpatient Visit    Visit Date: Fri, Nov 20, 2020 01:05 pm    Provider: Radha Nicole N.P. (Assistant: Bri Goss MA)    Location: [facility]        Electronically signed by Radha Nicole N.P. on  11/20/2020 03:42:13 PM                             Subjective:        CC: Toro is a 55 year old White male.  This is a follow-up visit.  check up has blood pressure readings with him today         HPI:       Here to discuss recent labs. A1C was 6.7 , no previous hx of DM, discussed good feet care, eye care , annual exam, diet , medication (Metformin) , labs every 3 mo to follow up on DM, HTN and Chol.     ROS:     CONSTITUTIONAL:  Negative for chills, fatigue, fever, and weight change.      CARDIOVASCULAR:  Positive for Hx  HTN , BP at home 140/83 at 1122 am, 159/122 and 159/94 and 173/88 at 4-5 pm , taking Metoprolol 100mg daily and Losartan 50mg daily.      RESPIRATORY:  Negative for recent cough, dyspnea and frequent wheezing.      GASTROINTESTINAL:  Negative for abdominal pain, constipation, diarrhea, nausea and vomiting.      PSYCHIATRIC:  Negative for anxiety, depression, and sleep disturbances.          Past Medical History / Family History / Social History:         Last Reviewed on 11/20/2020 01:17 PM by Radha Nicole    Past Medical History:             PAST MEDICAL HISTORY         Hyperlipidemia    Hypertension    congenital aortic stenosis s/p mechanical AVR     Sleep Apnea     Erectile Dysfunction     Anxiety    Bipolar Disorder         PREVENTIVE HEALTH MAINTENANCE             COLORECTAL CANCER SCREENING: Up to date (colonoscopy q10y; sigmoidoscopy q5y; Cologuard q3y) was last done 12/13/2014, Results are in chart; colonoscopy with normal results; The next colonoscopy is due  12/2024; Dr. Reed     PSA: was last done 01/2018 with normal results         Surgical History:         Cholecystectomy    Tonsillectomy    Vasectomy      mechanical aortic valve repair;    R ear surgery;    ascending aortic dilatation;         Family History:         Brother(s): aortic stenosis     Paternal Grandfather: Coronary Artery Disease     Maternal Grandfather: Coronary Artery Disease         Social History:     Occupation:    Disabled. previously worked for [employer];     Marital Status:      Children: 3 children daughter           Tobacco/Alcohol/Supplements:     Last Reviewed on 2020 01:17 PM by Radha Nicole    Tobacco: He has never smoked.          Substance Abuse History:     Last Reviewed on 2020 01:17 PM by Radha Nicole    None         Mental Health History:     Last Reviewed on 2020 01:17 PM by Radha Nicole        Communicable Diseases (eg STDs):     Last Reviewed on 2020 01:17 PM by Radha Nicole        Current Problems:     Last Reviewed on 2020 01:17 PM by Radha Nicole    Bipolar disorder, unspecified    HTN - Essential (primary) hypertension    Obstructive sleep apnea (adult) (pediatric)    Presence of prosthetic heart valve    Neoplasm of uncertain behavior of pharynx    Long term (current) use of anticoagulants    Other symptoms and signs involving cognitive functions and awareness    Vitamin D deficiency, unspecified    Anxiety disorder, unspecified    Headache    Cough        Immunizations:     influenza, injectable, quadrivalent, preservative free (FLUZONE QUAD 8870-1791) 9/10/2020    Hep A, adult dose 10/24/2018    Hep A, adult dose 2019    zzFluzone pf-quadrivalent 3 and up 2016    Fluzone (3 + years dose) 2017    Fluzone Quadrivalent (3+ years) 10/5/2018    Fluzone Quadrivalent (3+ years) 2019    Tdap (Tetanus, reduced diph, acellular pertussis) 10/24/2018        Allergies:     Last Reviewed on 2020 01:17 PM by Radha Nicole    shrimp:          Current Medications:     Last Reviewed on 2020 01:17 PM by Radha Nicole    Lorazepam  1 mg oral tablet [1 tab tid]    Risperidone 2 mg oral tablet [ONE TABLET AT BEDTIME]    Lithium Carbonate 450 mg oral tablet, extended release [2 tabs daily]    Pramipexole 0.25 mg oral tablet [one bid]    warfarin 5 mg oral tablet [6mg MWF, 6.5mg rest]    warfarin 1 mg oral tablet [6mg MWF, 6.5mg rest]    pravastatin 20 mg oral tablet [TAKE 1 TABLET BY MOUTH ONCE DAILY AT BEDTIME]    metoprolol succinate 100 mg oral Tablet, Extended Release 24 hr [1 tab daily]    Temazepam 30 mg oral capsule [One PO Q HS]    losartan 50 mg oral tablet [take 1 tablet (50 mg) by oral route daily]    pantoprazole 40 mg oral tablet, delayed release (enteric coated) [take 1 tablet (40 mg) daily]    sucralfate 1 gram oral tablet [take 1 tablet (1 gram) by oral route 2 times per day x 10 days on an empty stomach]        Objective:        Vitals:         Current: 11/20/2020 1:10:02 PM    Ht:  5 ft, 11.5 in;  Wt: 278.6 lbs;  BMI: 38.3T: 96.5 F (temporal);  BP: 151/61 mm Hg (left arm, sitting);  P: 63 bpm (left arm (BP Cuff), sitting);  sCr: 1.1 mg/dL;  GFR: 88.74        Exams:     PHYSICAL EXAM:     GENERAL: Vitals recorded well developed, well nourished;  well groomed;  no apparent distress;     RESPIRATORY: normal respiratory rate and pattern with no distress; normal breath sounds with no rales, rhonchi, wheezes or rubs;     CARDIOVASCULAR: normal rate; rhythm is regular;  no systolic murmur; no edema;     GASTROINTESTINAL: nontender, nondistended; no hepatosplenomegaly or masses; no bruits;     NEUROLOGIC: GROSSLY INTACT     PSYCHIATRIC:  appropriate affect and demeanor; normal speech pattern; grossly normal memory;         Lab/Test Results:         Coumadin (text dose): 6.5mg Tu/Fri, 6mg rest/ pdr (11/20/2020),     INR: 3.0 (11/20/2020),     Diagnosis / Indication: mechanical heart valve (11/20/2020),     Patient Education Offered: Yes (11/20/2020),             Assessment:         E11.9   Type 2 diabetes mellitus without complications        Z79.01   Long term (current) use of anticoagulants       I10   HTN - Essential (primary) hypertension           ORDERS:         Meds Prescribed:       [Refilled] losartan 50 mg oral tablet [take 1 tablet (50 mg) by oral route bid ], #180 (one hundred and eighty) tablets, Refills: 1 (one)       [New Rx] metFORMIN 500 mg oral tablet [take 1 tablet (500 mg) by oral route daily], #90 (ninety) tablets, Refills: 0 (zero)         Lab Orders:       [id number]  PRO-T - [facility] Prothrombin Time PT/INR  (In-House)            APPTO  Appointment need  (In-House)              Procedures Ordered:       REFER  Referral to Specialist or Other Facility  (Send-Out)                      Plan:         Type 2 diabetes mellitus without complications        REFERRALS:  Referral initiated to an ophthalmologist.      FOLLOW-UP: Schedule follow-up appointments on a p.r.n. basis.:Schedule a follow-up visit in 3 months.:.:for FU DM with PCP:Chronic visit follow up           Prescriptions:       [New Rx] metFORMIN 500 mg oral tablet [take 1 tablet (500 mg) by oral route daily], #90 (ninety) tablets, Refills: 0 (zero)           Orders:       REFER  Referral to Specialist or Other Facility  (Send-Out)            APPTO  Appointment need  (In-House)              Long term (current) use of anticoagulantsNo change , repeat INR 1 mo. dmt    LABORATORY:  Labs ordered to be performed today include INR.            Orders:       [id number]  PRO-T - [facility] Prothrombin Time PT/INR  (In-House)              HTN - Essential (primary) hypertensionWill call in 2 weeks for BP readings, dmt          Prescriptions:       [Refilled] losartan 50 mg oral tablet [take 1 tablet (50 mg) by oral route bid ], #180 (one hundred and eighty) tablets, Refills: 1 (one)             Patient Recommendations:        For  Type 2 diabetes mellitus without complications:    Schedule follow-up appointments as needed. Schedule a follow-up visit in 3 months.                APPOINTMENT INFORMATION:         Monday Tuesday Wednesday Thursday Friday Saturday Sunday            Time:___________________AM  PM   Date:_____________________             Charge Capture:         Primary Diagnosis:     E11.9  Type 2 diabetes mellitus without complications           Orders:      [id number]  Office/outpatient visit; established patient, level 4  (In-House)            APPTO  Appointment need  (In-House)              Z79.01  Long term (current) use of anticoagulants           Orders:      [id number]  PRO-T - [facility] Prothrombin Time PT/INR  (In-House)              I10  HTN - Essential (primary) hypertension         ADDENDUMS:      ____________________________________    Addendum: 11/24/2020 02:49 PM - Radha Nicole        ADDENDUM: Add [id number]; Remove [id number] dmt

## 2021-05-18 NOTE — PROGRESS NOTES
Freedom Stevens  1965     Office/Outpatient Visit    Visit Date: Thu, Sep 10, 2020 09:39 am    Provider: Isabella Barber MD (Assistant: Kathie Carbajal RN)    Location: [facility]        Electronically signed by Isabella Barber MD on  09/10/2020 12:44:31 PM                             Subjective:        CC: Toro is a 55 year old White male.  6 month follow up         HPI: Toro is here today to f/u on chronic issues.        He is on losartan and metoprolol succinate for HTN.  BP has been well controlled.  No CP, palpitations, SOB.          He is on pravastatin for HLD.  No issues with myalgias.          He is on warfarin for anticoagulation of a mechanical heart valve.  He follows with Dr. Geoff Gann once a year.  We are managing the INR.        He follows with Dr. Mccauley for bipolar disorder with depressed mood and anxiety.  He is on a complex regimen of lithium, lorazepam, risperidone, pramipexole, and temazepam.  Seems to work pretty well for him.        He has DORIAN; unable to tolerate CPAP.    ROS:     CONSTITUTIONAL:  Negative for fatigue and fever.      EYES:  Negative for blurred vision.      CARDIOVASCULAR:  Negative for chest pain and palpitations.      RESPIRATORY:  Negative for recent cough and dyspnea.      GASTROINTESTINAL:  Negative for abdominal pain, constipation, diarrhea, nausea and vomiting.      MUSCULOSKELETAL:  Negative for arthralgias and myalgias.      NEUROLOGICAL:  Negative for paresthesias and weakness.      PSYCHIATRIC:  Positive for depression, anhedonia, difficulty concentrating and sleep disturbance.   Negative for anxiety, crying spells or suicidal thoughts.          Past Medical History / Family History / Social History:         Last Reviewed on 9/10/2020 09:48 AM by Isabella Barber    Past Medical History:             PAST MEDICAL HISTORY         Hyperlipidemia    Hypertension    congenital aortic stenosis s/p mechanical AVR     Sleep Apnea      Erectile Dysfunction     Anxiety    Bipolar Disorder         PREVENTIVE HEALTH MAINTENANCE             COLORECTAL CANCER SCREENING: Up to date (colonoscopy q10y; sigmoidoscopy q5y; Cologuard q3y) was last done 2014, Results are in chart; colonoscopy with normal results; The next colonoscopy is due  2024; Dr. Reed     PSA: was last done 2018 with normal results         Surgical History:         Cholecystectomy    Tonsillectomy    Vasectomy     mechanical aortic valve repair;    R ear surgery;    ascending aortic dilatation;         Family History:         Brother(s): aortic stenosis     Paternal Grandfather: Coronary Artery Disease     Maternal Grandfather: Coronary Artery Disease         Social History:     Occupation:    Disabled. previously worked for [employer];     Marital Status:      Children: 3 children daughter           Tobacco/Alcohol/Supplements:     Last Reviewed on 9/10/2020 09:48 AM by Isabella Barber    Tobacco: He has never smoked.          Substance Abuse History:     Last Reviewed on 9/10/2020 09:48 AM by Isabella Barber    None         Mental Health History:     Last Reviewed on 9/10/2020 09:48 AM by Isabella Barber        Communicable Diseases (eg STDs):     Last Reviewed on 9/10/2020 09:48 AM by Isabella Barber        Current Problems:     Last Reviewed on 2020 03:49 PM by Keyla Lucas    HLD - Mixed hyperlipidemia    Bipolar disorder, unspecified    HTN - Essential (primary) hypertension    Obstructive sleep apnea (adult) (pediatric)    Presence of prosthetic heart valve    Neoplasm of uncertain behavior of pharynx    Long term (current) use of anticoagulants    Other symptoms and signs involving cognitive functions and awareness    Vitamin D deficiency, unspecified    Anxiety disorder, unspecified    Encounter for general adult medical examination without abnormal findings    Headache    Bitten or stung by nonvenomous insect and other nonvenomous arthropods,  initial encounter    Hematuria, unspecified        Immunizations:     Hep A, adult dose 10/24/2018    Hep A, adult dose 5/6/2019    zzFluzone pf-quadrivalent 3 and up 12/9/2016    Fluzone (3 + years dose) 9/27/2017    Fluzone Quadrivalent (3+ years) 10/5/2018    Fluzone Quadrivalent (3+ years) 9/27/2019    Tdap (Tetanus, reduced diph, acellular pertussis) 10/24/2018        Allergies:     Last Reviewed on 4/17/2020 03:49 PM by Keyla Lucas    shrimp:          Current Medications:     Last Reviewed on 9/10/2020 09:42 AM by Kathie Carbajal    Lorazepam 1 mg oral tablet [1 tab tid]    Risperidone 2 mg oral tablet [ONE TABLET AT BEDTIME]    Lithium Carbonate 450 mg oral tablet, extended release [2 tabs daily]    Pramipexole 0.25 mg oral tablet [one bid]    warfarin 1 mg oral tablet [6mg MWF, 6.5mg rest]    warfarin 5 mg oral tablet [6mg MWF, 6.5mg rest]    pravastatin 20 mg oral tablet [TAKE 1 TABLET BY MOUTH ONCE DAILY AT BEDTIME]    metoprolol succinate 100 mg oral Tablet, Extended Release 24 hr [1 tab daily]    Temazepam 30 mg oral capsule [One PO Q HS]    losartan 50 mg oral tablet [take 1 tablet (50 mg) by oral route daily]        Objective:        Vitals:         Current: 9/10/2020 9:44:20 AM    Ht:  5 ft, 11.5 in;  Wt: 281.6 lbs;  BMI: 38.7T: 97.1 F (temporal);  BP: 141/83 mm Hg (left arm, sitting);  P: 58 bpm (left arm (BP Cuff), sitting);  sCr: 1.17 mg/dL;  GFR: 83.81        Repeat:     10:6:29 AM  BP:   137/72mm Hg (left arm, sitting, HR: 61)     Exams:     PHYSICAL EXAM:     GENERAL: Vitals recorded well developed, well nourished;  well groomed;  no apparent distress;     EYES: extraocular movements intact; conjunctiva and cornea are normal; PERRL;     E/N/T: OROPHARYNX:  normal mucosa, dentition, gingiva, and posterior pharynx;     RESPIRATORY: normal respiratory rate and pattern with no distress; normal breath sounds with no rales, rhonchi, wheezes or rubs;     CARDIOVASCULAR: normal rate; rhythm is  regular;  no systolic murmur;     GASTROINTESTINAL: nontender; normal bowel sounds;     MUSCULOSKELETAL: normal gait; normal overall tone     PSYCHIATRIC: affect/demeanor: blunted;  normal psychomotor function; normal speech pattern;         Lab/Test Results:         Coumadin (text dose): per pt 6.5mg Tu/F, 6mg rest/ pdr (09/10/2020),     INR: 3.5 (09/10/2020),     Diagnosis / Indication: mechanical heart valve (09/10/2020),     Patient Education Offered: Yes (09/10/2020),             Assessment:         I10   HTN - Essential (primary) hypertension       E78.2   HLD - Mixed hyperlipidemia       Z95.2   Presence of prosthetic heart valve       F31.9   Bipolar disorder, unspecified       G47.33   Obstructive sleep apnea (adult) (pediatric)       Z23   Encounter for immunization           ORDERS:         Meds Prescribed:       [Refilled] metoprolol succinate 100 mg oral Tablet, Extended Release 24 hr [1 tab daily], #90 (ninety) tablets, Refills: 1 (one)       [Refilled] pravastatin 20 mg oral tablet [TAKE 1 TABLET BY MOUTH ONCE DAILY AT BEDTIME], #90 (ninety) each, Refills: 1 (one)       [Refilled] losartan 50 mg oral tablet [take 1 tablet (50 mg) by oral route daily], #90 (ninety) tablets, Refills: 1 (one)       [Refilled] warfarin 5 mg oral tablet [6mg MWF, 6.5mg rest], #90 (ninety) each, Refills: 1 (one)       [Refilled] warfarin 1 mg oral tablet [6mg MWF, 6.5mg rest], #90 (ninety) tablets, Refills: 1 (one)         Radiology/Test Orders:       3017F  Colorectal CA screen results documented and reviewed (PV)  (In-House)              Lab Orders:       APPTO  Appointment need  (In-House)            [id number]  HTN - [facility] CMP AND LIPID: [id number]  (Send-Out)            08898  Prothrombin time   (In-House)              Procedures Ordered:       [id number]  Immunization administration; one vaccine  (In-House)              Other Orders:       [id number]  Influenza virus vaccine, quadrivalent, split virus, preservative free 3 years of  age & older  (In-House)              Depression screen negative  (In-House)                      Plan:         HTN - Essential (primary) hypertensionBP at goal.  Checking labs.  Refills sent.  RTC 6 months for PEx.    LABORATORY:  Labs ordered to be performed today include HTN/Lipid Panel: CMP, Lipid.  MIPS PHQ-9 Depression Screening: Completed form scanned and in chart; Total Score 2; Negative Depression Screen     FOLLOW-UP: Schedule a follow-up visit in 6 months.:.  annual physical 30 min with Sunil          Prescriptions:       [Refilled] metoprolol succinate 100 mg oral Tablet, Extended Release 24 hr [1 tab daily], #90 (ninety) tablets, Refills: 1 (one)       [Refilled] losartan 50 mg oral tablet [take 1 tablet (50 mg) by oral route daily], #90 (ninety) tablets, Refills: 1 (one)           Orders:       3017F  Colorectal CA screen results documented and reviewed (PV)  (In-House)            APPTO  Appointment need  (In-House)              Depression screen negative  (In-House)            29789  HTN - [facility] CMP AND LIPID: [id number]  (Send-Out)              HLD - Mixed hyperlipidemiaStable.  Checking labs.  Refills sent.          Prescriptions:       [Refilled] pravastatin 20 mg oral tablet [TAKE 1 TABLET BY MOUTH ONCE DAILY AT BEDTIME], #90 (ninety) each, Refills: 1 (one)         Presence of prosthetic heart valveOn Coumadin anticoagulation.  INR has been therapeutic.  Planning to recheck INR today.          Prescriptions:       [Refilled] warfarin 5 mg oral tablet [6mg MWF, 6.5mg rest], #90 (ninety) each, Refills: 1 (one)       [Refilled] warfarin 1 mg oral tablet [6mg MWF, 6.5mg rest], #90 (ninety) tablets, Refills: 1 (one)           Orders:       [id number]  Prothrombin time   (In-House)              Bipolar disorder, unspecifiedFollows with Dr. Mccauley.  Stable on current regimen.        Obstructive sleep apnea (adult) (pediatric)Unable to tolerate CPAP.        Encounter for immunization         IMMUNIZATIONS given today: Influenza Quadrivalent psv free shot 3 & up.            Immunizations:       42397  Influenza virus vaccine, quadrivalent, split virus, preservative free 3 years of age & older  (In-House)                Dose (ml): 0.5  Site: left deltoid  Route: intramuscular  Administered by: Angelika Coker          : [employer]  Lot #: [id number]  Exp: 06/30/2021          NDC: [address]-56      87778  Immunization administration; one vaccine  (In-House)                  Patient Recommendations:        For  HTN - Essential (primary) hypertension:    Schedule a follow-up visit in 6 months.                APPOINTMENT INFORMATION:        Monday Tuesday Wednesday Thursday Friday Saturday Sunday            Time:___________________AM  PM   Date:_____________________             Charge Capture:         Primary Diagnosis:     I10  HTN - Essential (primary) hypertension           Orders:      38783  Office/outpatient visit; established patient, level 4  (In-House)            3017F  Colorectal CA screen results documented and reviewed (PV)  (In-House)            APPTO  Appointment need  (In-House)              Depression screen negative  (In-House)              E78.2  HLD - Mixed hyperlipidemia     Z95.2  Presence of prosthetic heart valve           Orders:      77328  Prothrombin time   (In-House)              F31.9  Bipolar disorder, unspecified     G47.33  Obstructive sleep apnea (adult) (pediatric)     Z23  Encounter for immunization           Orders:      38141  Influenza virus vaccine, quadrivalent, split virus, preservative free 3 years of age & older  (In-House)            62132  Immunization administration; one vaccine  (In-House)

## 2021-05-18 NOTE — PROGRESS NOTES
Freedom Stevens  1965     Office/Outpatient Visit    Visit Date: Tue, Mar 10, 2020 10:47 am    Provider: Isabelal Barber MD (Assistant: Angelika Coker MA)    Location: [facility]        Electronically signed by Isabella Barber MD on  03/10/2020 12:57:10 PM                             Subjective:        CC: Toro is a 54 year old White male.  Patient presents today for follow up visit         HPI: Toro is here today for his annual physical.  He is UTD on colonoscopy, last done 12/2014 and this was normal.  He is due for prostate cancer screening.  He is UTD on Tdap (10/2018) and flu (9/2019).  He is due for Shingrix.  He is due for routine labs including HTN panel and PSA.        He is on metoprolol succinate and losartan for HTN.  BP has been well controlled.  No CP, palpitations, SOB.          He is on pravastatin for cholesterol.  No myalgias or other adverse effects.          He is on warfarin for anticoagulation of a mechanical heart valve.  He follows with Dr. Geoff Gann once yearly.  We are managing the INR.        He follows with Dr. Mccauley for bipolar disorder with depression and anxiety.  He is on a complex regimen of lithium, lorazepam, risperidone, pramipexole, and temazepam.        He has DORIAN but is unable to tolerate CPAP.        He has had an itchy rash for the past 3-4 days.  Stops just at the line of the shirt sleeve.  They did recently change soap powders.    ROS:     CONSTITUTIONAL:  Negative for fatigue and fever.      EYES:  Negative for blurred vision.      E/N/T:  Negative for diminished hearing and nasal congestion.      CARDIOVASCULAR:  Negative for chest pain and palpitations.      RESPIRATORY:  Negative for recent cough and dyspnea.      GASTROINTESTINAL:  Negative for abdominal pain, constipation, diarrhea, nausea and vomiting.      GENITOURINARY:  Positive for nocturia and (3x per night) malodorous urine.   Negative for dysuria or change in urine  stream.      MUSCULOSKELETAL:  Negative for arthralgias and myalgias.      INTEGUMENTARY:  Positive for rash.      NEUROLOGICAL:  Negative for paresthesias and weakness.      PSYCHIATRIC:  Positive for depression, anhedonia, difficulty concentrating and sleep disturbance.   Negative for anxiety, crying spells or suicidal thoughts.          Past Medical History / Family History / Social History:         Last Reviewed on 3/10/2020 11:00 AM by Isabella Barber    Past Medical History:             PAST MEDICAL HISTORY         Hyperlipidemia    Hypertension    congenital aortic stenosis s/p mechanical AVR     Sleep Apnea     Erectile Dysfunction     Anxiety    Bipolar Disorder         PREVENTIVE HEALTH MAINTENANCE             COLORECTAL CANCER SCREENING: Up to date (colonoscopy q10y; sigmoidoscopy q5y; Cologuard q3y) was last done 2014, Results are in chart; colonoscopy with normal results; The next colonoscopy is due  2024; Dr. Reed     PSA: was last done 2018 with normal results         Surgical History:         Cholecystectomy    Tonsillectomy    Vasectomy     mechanical aortic valve repair;    R ear surgery;    ascending aortic dilatation;         Family History:         Brother(s): aortic stenosis     Paternal Grandfather: Coronary Artery Disease     Maternal Grandfather: Coronary Artery Disease         Social History:     Occupation:    Disabled. previously worked for [employer];     Marital Status:      Children: 3 children daughter           Tobacco/Alcohol/Supplements:     Last Reviewed on 3/10/2020 11:00 AM by Isabella Barber    Tobacco: He has never smoked.          Substance Abuse History:     Last Reviewed on 3/10/2020 11:00 AM by Isabella Barber        Mental Health History:     Last Reviewed on 3/10/2020 11:00 AM by Isabella Barber        Communicable Diseases (eg STDs):     Last Reviewed on 3/10/2020 11:00 AM by Isabella Barber        Current Problems:     Last Reviewed on  5/07/2019 10:47 AM by Isabella Barber    Mixed hyperlipidemia    Bipolar disorder, unspecified    Essential (primary) hypertension    Obstructive sleep apnea (adult) (pediatric)    Presence of prosthetic heart valve    Neoplasm of uncertain behavior of pharynx    Long term (current) use of anticoagulants    Other symptoms and signs involving cognitive functions and awareness    Vitamin D deficiency, unspecified    Anxiety disorder, unspecified        Immunizations:     Hep A, adult dose 10/24/2018    Hep A, adult dose 5/6/2019    zzFluzone pf-quadrivalent 3 and up 12/9/2016    Fluzone (3 + years dose) 9/27/2017    Fluzone Quadrivalent (3+ years) 10/5/2018    Fluzone Quadrivalent (3+ years) 9/27/2019    Tdap (Tetanus, reduced diph, acellular pertussis) 10/24/2018        Allergies:     Last Reviewed on 11/21/2019 12:51 PM by Karen Xiao    shrimp:          Current Medications:     Last Reviewed on 11/21/2019 12:51 PM by Karen Xiao    Lorazepam 1 mg oral tablet [1 tab tid]    Risperidone 2 mg oral tablet [ONE TABLET AT BEDTIME]    Lithium Carbonate 450 mg oral tablet, extended release [2 tabs daily]    Pramipexole 0.25 mg oral tablet [one bid]    pravastatin 20 mg oral tablet [TAKE 1 TABLET BY MOUTH ONCE DAILY AT BEDTIME]    warfarin 5 mg oral tablet [TAKE 5MG BY MOUTH WITH 1MG TABLET FOR TOTAL OF 6MG ON MONDAY, WEDNESDAY AND FRIDAY AND THEN TAKE 6.5MG REST OF WEEK]    warfarin 1 mg oral tablet [6mg MWF, 6.5mg rest]    metoprolol succinate 100 mg oral Tablet, Extended Release 24 hr [1 tab daily]    losartan 25 mg oral tablet [TAKE 1 TABLET BY MOUTH ONCE DAILY]    Temazepam 30 mg oral capsule [One PO Q HS]        Objective:        Vitals:         Current: 3/10/2020 10:51:00 AM    Ht:  5 ft, 11.5 in;  Wt: 285.4 lbs;  BMI: 39.3T: 98.4 F (oral);  BP: 136/78 mm Hg (right arm, sitting);  P: 92 bpm (right arm (BP Cuff), sitting);  sCr: 0.91 mg/dL;  GFR: 109.62        Exams:     PHYSICAL EXAM:     GENERAL: Vitals recorded well  developed, well nourished;  well groomed;  no apparent distress;     EYES: extraocular movements intact; conjunctiva and cornea are normal; PERRL;     E/N/T: OROPHARYNX:  normal mucosa, dentition, gingiva, and posterior pharynx;     RESPIRATORY: normal respiratory rate and pattern with no distress; normal breath sounds with no rales, rhonchi, wheezes or rubs;     CARDIOVASCULAR: normal rate; rhythm is regular;  (+) mid- systolic click; no systolic murmur;     GASTROINTESTINAL: nontender; normal bowel sounds;     GENITOURINARY: prostate:  no nodules, tenderness, or enlargement; Chaperone: DECLINES     SKIN: a rash is noted medial R upper arm;  the color is mainly red;  it is best characterized as papular;     MUSCULOSKELETAL: normal gait; normal overall tone     PSYCHIATRIC: affect/demeanor: flat;  normal psychomotor function; normal speech pattern;         Assessment:         Z00.00   Encounter for general adult medical examination without abnormal findings       I10   HTN - Essential (primary) hypertension       E78.2   HLD - Mixed hyperlipidemia       Z95.2   Presence of prosthetic heart valve       F31.9   Bipolar disorder, unspecified       G47.33   Obstructive sleep apnea (adult) (pediatric)       Z13.31   Encounter for screening for depression           ORDERS:         Meds Prescribed:       [Refilled] metoprolol succinate 100 mg oral Tablet, Extended Release 24 hr [1 tab daily], #90 (ninety) tablets, Refills: 1 (one)       [Refilled] losartan 25 mg oral tablet [TAKE 1 TABLET BY MOUTH ONCE DAILY], #90 (ninety) each, Refills: 1 (one)         Radiology/Test Orders:       3017F  Colorectal CA screen results documented and reviewed (PV)  (In-House)              Lab Orders:       [id number]  HTN - [facility] CMP AND LIPID: [id number]  (Send-Out)            *  PRSAS Medicare screening PSA  (Send-Out)            APPTO  Appointment need  (In-House)            [id number]  BDUA - [facility] Urinalysis, automated, with micro   (Send-Out)              Other Orders:         Depression screen positive and follow up plan documented  (In-House)                      Plan:         Encounter for general adult medical examination without abnormal findingsToro is here today for his annual physical.  He is UTD on colonoscopy, last done 12/2014 and this was normal.  He is due for prostate cancer screening; OMKAR normal.  PSA ordered.  He is UTD on Tdap (10/2018) and flu (9/2019).  He is due for Shingrix; can be done  at the pharmacy.  He is due for routine labs including HTN panel and PSA; ordered.  RTC 6 months.    LABORATORY:  Labs ordered to be performed today include PSA.  MIPS PHQ-9 Depression Screening: Completed form scanned and in chart; Total Score 16 Positive Depression Screen: Stable on medications. No suicidal ideation.      FOLLOW-UP: Schedule a follow-up visit in 6 months.:.  f/u HTN with [place]          Orders:       *  PRSAS Medicare screening PSA  (Send-Out)            3017F  Colorectal CA screen results documented and reviewed (PV)  (In-House)            APPTO  Appointment need  (In-House)              Depression screen positive and follow up plan documented  (In-House)              HTN - Essential (primary) hypertensionChecking labs.  Including UA as he has had malodorous urine for the past 6 months.  No change in color.  BP has been running a bit high at his INR checks.  Increasing losartan to 50 mg daily.  Checking labs.    LABORATORY:  Labs ordered to be performed today include HTN/Lipid Panel: CMP, Lipid and urinalysis with micro.            Prescriptions:       [Refilled] metoprolol succinate 100 mg oral Tablet, Extended Release 24 hr [1 tab daily], #90 (ninety) tablets, Refills: 1 (one)       [Refilled] losartan 25 mg oral tablet [TAKE 1 TABLET BY MOUTH ONCE DAILY], #90 (ninety) each, Refills: 1 (one)           Orders:       05803  HTNLP - [facility] CMP AND LIPID: [id number]  (Send-Out)            17112  BDUAM - [facility]  Urinalysis, automated, with micro  (Send-Out)              HLD - Mixed hyperlipidemiaStable.  Checking labs.  No refills needed.        Presence of prosthetic heart valveOn Coumadin.  Monitoring INR here in clinic.        Bipolar disorder, unspecifiedFollows with Dr. Mccauley.        Obstructive sleep apnea (adult) (pediatric)Unable to tolerate CPAP.            Patient Recommendations:        For  Encounter for general adult medical examination without abnormal findings:    Schedule a follow-up visit in 6 months.                APPOINTMENT INFORMATION:        Monday Tuesday Wednesday Thursday Friday Saturday Sunday            Time:___________________AM  PM   Date:_____________________             Charge Capture:         Primary Diagnosis:     Z00.00  Encounter for general adult medical examination without abnormal findings           Orders:      74127  Preventive medicine, established patient, age 40-64 years  (In-House)            3017F  Colorectal CA screen results documented and reviewed (PV)  (In-House)            APPTO  Appointment need  (In-House)              Depression screen positive and follow up plan documented  (In-House)              I10  HTN - Essential (primary) hypertension           Orders:      15142-01  Office/outpatient visit; established patient, level 3  (In-House)              E78.2  HLD - Mixed hyperlipidemia     Z95.2  Presence of prosthetic heart valve     F31.9  Bipolar disorder, unspecified     G47.33  Obstructive sleep apnea (adult) (pediatric)     Z13.31  Encounter for screening for depression

## 2021-05-25 ENCOUNTER — CONVERSION ENCOUNTER (OUTPATIENT)
Dept: FAMILY MEDICINE CLINIC | Age: 56
End: 2021-05-25

## 2021-06-08 ENCOUNTER — ANTICOAGULATION VISIT (OUTPATIENT)
Dept: FAMILY MEDICINE CLINIC | Age: 56
End: 2021-06-08

## 2021-06-08 VITALS — HEART RATE: 62 BPM | SYSTOLIC BLOOD PRESSURE: 134 MMHG | DIASTOLIC BLOOD PRESSURE: 80 MMHG

## 2021-06-08 DIAGNOSIS — Z95.2 HX OF AORTIC VALVE REPLACEMENT, MECHANICAL: Primary | ICD-10-CM

## 2021-06-08 LAB — INR PPP: 2.3 (ref 0.9–1.1)

## 2021-06-08 PROCEDURE — 93793 ANTICOAG MGMT PT WARFARIN: CPT | Performed by: FAMILY MEDICINE

## 2021-06-08 PROCEDURE — 85610 PROTHROMBIN TIME: CPT | Performed by: FAMILY MEDICINE

## 2021-06-08 PROCEDURE — 36416 COLLJ CAPILLARY BLOOD SPEC: CPT | Performed by: FAMILY MEDICINE

## 2021-06-15 ENCOUNTER — ANTICOAGULATION VISIT (OUTPATIENT)
Dept: FAMILY MEDICINE CLINIC | Age: 56
End: 2021-06-15

## 2021-06-15 VITALS — SYSTOLIC BLOOD PRESSURE: 153 MMHG | HEART RATE: 70 BPM | DIASTOLIC BLOOD PRESSURE: 90 MMHG

## 2021-06-15 DIAGNOSIS — Z95.2 HX OF AORTIC VALVE REPLACEMENT, MECHANICAL: Primary | ICD-10-CM

## 2021-06-15 LAB — INR PPP: 2.6 (ref 2–3)

## 2021-06-15 PROCEDURE — 85610 PROTHROMBIN TIME: CPT | Performed by: FAMILY MEDICINE

## 2021-06-15 PROCEDURE — 36416 COLLJ CAPILLARY BLOOD SPEC: CPT | Performed by: FAMILY MEDICINE

## 2021-07-01 VITALS
BODY MASS INDEX: 38.01 KG/M2 | WEIGHT: 280.6 LBS | TEMPERATURE: 97.3 F | DIASTOLIC BLOOD PRESSURE: 70 MMHG | HEART RATE: 53 BPM | SYSTOLIC BLOOD PRESSURE: 116 MMHG | HEIGHT: 72 IN

## 2021-07-01 VITALS
BODY MASS INDEX: 38.01 KG/M2 | DIASTOLIC BLOOD PRESSURE: 77 MMHG | HEART RATE: 60 BPM | HEIGHT: 72 IN | SYSTOLIC BLOOD PRESSURE: 134 MMHG

## 2021-07-01 VITALS
DIASTOLIC BLOOD PRESSURE: 82 MMHG | SYSTOLIC BLOOD PRESSURE: 148 MMHG | WEIGHT: 276.4 LBS | BODY MASS INDEX: 37.44 KG/M2 | TEMPERATURE: 98.8 F | HEART RATE: 62 BPM | HEIGHT: 72 IN

## 2021-07-01 VITALS
BODY MASS INDEX: 37.96 KG/M2 | HEIGHT: 72 IN | SYSTOLIC BLOOD PRESSURE: 157 MMHG | HEART RATE: 50 BPM | DIASTOLIC BLOOD PRESSURE: 83 MMHG

## 2021-07-01 VITALS
HEIGHT: 72 IN | BODY MASS INDEX: 38.01 KG/M2 | HEART RATE: 57 BPM | DIASTOLIC BLOOD PRESSURE: 70 MMHG | SYSTOLIC BLOOD PRESSURE: 130 MMHG

## 2021-07-01 VITALS
DIASTOLIC BLOOD PRESSURE: 79 MMHG | HEIGHT: 72 IN | SYSTOLIC BLOOD PRESSURE: 139 MMHG | BODY MASS INDEX: 38.01 KG/M2 | HEART RATE: 61 BPM

## 2021-07-01 VITALS
HEART RATE: 42 BPM | BODY MASS INDEX: 38.01 KG/M2 | DIASTOLIC BLOOD PRESSURE: 80 MMHG | SYSTOLIC BLOOD PRESSURE: 158 MMHG | HEIGHT: 72 IN

## 2021-07-01 VITALS
SYSTOLIC BLOOD PRESSURE: 142 MMHG | HEIGHT: 72 IN | TEMPERATURE: 97.3 F | HEART RATE: 53 BPM | DIASTOLIC BLOOD PRESSURE: 80 MMHG | BODY MASS INDEX: 38.01 KG/M2

## 2021-07-01 VITALS
HEART RATE: 69 BPM | DIASTOLIC BLOOD PRESSURE: 87 MMHG | HEIGHT: 72 IN | SYSTOLIC BLOOD PRESSURE: 137 MMHG | BODY MASS INDEX: 38.01 KG/M2

## 2021-07-01 VITALS
SYSTOLIC BLOOD PRESSURE: 122 MMHG | HEART RATE: 61 BPM | HEIGHT: 72 IN | DIASTOLIC BLOOD PRESSURE: 49 MMHG | WEIGHT: 276 LBS | TEMPERATURE: 97.6 F | BODY MASS INDEX: 37.38 KG/M2

## 2021-07-01 VITALS
HEIGHT: 72 IN | BODY MASS INDEX: 37.73 KG/M2 | DIASTOLIC BLOOD PRESSURE: 88 MMHG | TEMPERATURE: 98.9 F | HEART RATE: 65 BPM | SYSTOLIC BLOOD PRESSURE: 164 MMHG | WEIGHT: 278.6 LBS

## 2021-07-01 VITALS
DIASTOLIC BLOOD PRESSURE: 82 MMHG | HEART RATE: 46 BPM | SYSTOLIC BLOOD PRESSURE: 147 MMHG | BODY MASS INDEX: 38.01 KG/M2 | HEIGHT: 72 IN

## 2021-07-01 VITALS
HEIGHT: 72 IN | SYSTOLIC BLOOD PRESSURE: 149 MMHG | HEART RATE: 54 BPM | BODY MASS INDEX: 38.01 KG/M2 | DIASTOLIC BLOOD PRESSURE: 85 MMHG

## 2021-07-01 VITALS
WEIGHT: 274 LBS | HEART RATE: 55 BPM | SYSTOLIC BLOOD PRESSURE: 147 MMHG | HEIGHT: 72 IN | DIASTOLIC BLOOD PRESSURE: 85 MMHG | TEMPERATURE: 98.5 F | BODY MASS INDEX: 37.11 KG/M2

## 2021-07-02 VITALS
BODY MASS INDEX: 38.14 KG/M2 | DIASTOLIC BLOOD PRESSURE: 72 MMHG | WEIGHT: 281.6 LBS | TEMPERATURE: 97.1 F | HEIGHT: 72 IN | HEART RATE: 58 BPM | SYSTOLIC BLOOD PRESSURE: 137 MMHG

## 2021-07-02 VITALS
HEIGHT: 72 IN | BODY MASS INDEX: 39.39 KG/M2 | SYSTOLIC BLOOD PRESSURE: 134 MMHG | HEART RATE: 65 BPM | DIASTOLIC BLOOD PRESSURE: 80 MMHG

## 2021-07-02 VITALS
HEART RATE: 51 BPM | DIASTOLIC BLOOD PRESSURE: 77 MMHG | BODY MASS INDEX: 39.25 KG/M2 | SYSTOLIC BLOOD PRESSURE: 154 MMHG | HEIGHT: 72 IN

## 2021-07-02 VITALS
WEIGHT: 278.6 LBS | BODY MASS INDEX: 37.73 KG/M2 | HEIGHT: 72 IN | DIASTOLIC BLOOD PRESSURE: 61 MMHG | HEART RATE: 63 BPM | TEMPERATURE: 96.5 F | SYSTOLIC BLOOD PRESSURE: 151 MMHG

## 2021-07-02 VITALS
SYSTOLIC BLOOD PRESSURE: 158 MMHG | HEIGHT: 72 IN | BODY MASS INDEX: 38.62 KG/M2 | HEART RATE: 67 BPM | DIASTOLIC BLOOD PRESSURE: 91 MMHG

## 2021-07-02 VITALS
BODY MASS INDEX: 38.62 KG/M2 | DIASTOLIC BLOOD PRESSURE: 91 MMHG | SYSTOLIC BLOOD PRESSURE: 139 MMHG | HEIGHT: 72 IN | HEART RATE: 59 BPM

## 2021-07-02 VITALS
HEART RATE: 77 BPM | HEIGHT: 72 IN | BODY MASS INDEX: 38.01 KG/M2 | SYSTOLIC BLOOD PRESSURE: 158 MMHG | DIASTOLIC BLOOD PRESSURE: 92 MMHG

## 2021-07-02 VITALS
BODY MASS INDEX: 38.62 KG/M2 | TEMPERATURE: 96.2 F | HEIGHT: 72 IN | HEART RATE: 60 BPM | SYSTOLIC BLOOD PRESSURE: 145 MMHG | BODY MASS INDEX: 38.62 KG/M2 | DIASTOLIC BLOOD PRESSURE: 83 MMHG | HEIGHT: 72 IN

## 2021-07-02 VITALS
HEIGHT: 72 IN | TEMPERATURE: 96.9 F | DIASTOLIC BLOOD PRESSURE: 82 MMHG | BODY MASS INDEX: 38.73 KG/M2 | HEART RATE: 58 BPM | SYSTOLIC BLOOD PRESSURE: 157 MMHG

## 2021-07-02 VITALS
DIASTOLIC BLOOD PRESSURE: 81 MMHG | HEART RATE: 58 BPM | HEIGHT: 72 IN | SYSTOLIC BLOOD PRESSURE: 154 MMHG | BODY MASS INDEX: 38.01 KG/M2

## 2021-07-02 VITALS
SYSTOLIC BLOOD PRESSURE: 140 MMHG | WEIGHT: 286.4 LBS | HEART RATE: 64 BPM | BODY MASS INDEX: 38.79 KG/M2 | HEIGHT: 72 IN | DIASTOLIC BLOOD PRESSURE: 79 MMHG | TEMPERATURE: 96.7 F

## 2021-07-02 VITALS
SYSTOLIC BLOOD PRESSURE: 151 MMHG | WEIGHT: 280.2 LBS | HEIGHT: 72 IN | HEART RATE: 51 BPM | BODY MASS INDEX: 37.95 KG/M2 | OXYGEN SATURATION: 100 % | TEMPERATURE: 95.4 F | DIASTOLIC BLOOD PRESSURE: 87 MMHG

## 2021-07-02 VITALS
SYSTOLIC BLOOD PRESSURE: 136 MMHG | BODY MASS INDEX: 38.66 KG/M2 | DIASTOLIC BLOOD PRESSURE: 78 MMHG | HEIGHT: 72 IN | WEIGHT: 285.4 LBS | TEMPERATURE: 98.4 F | HEART RATE: 92 BPM

## 2021-07-02 VITALS
SYSTOLIC BLOOD PRESSURE: 145 MMHG | DIASTOLIC BLOOD PRESSURE: 90 MMHG | HEART RATE: 96 BPM | BODY MASS INDEX: 38.01 KG/M2 | HEIGHT: 72 IN

## 2021-07-02 VITALS
SYSTOLIC BLOOD PRESSURE: 126 MMHG | TEMPERATURE: 97.2 F | HEIGHT: 72 IN | DIASTOLIC BLOOD PRESSURE: 83 MMHG | HEART RATE: 65 BPM | BODY MASS INDEX: 39.2 KG/M2

## 2021-07-02 VITALS
SYSTOLIC BLOOD PRESSURE: 142 MMHG | DIASTOLIC BLOOD PRESSURE: 94 MMHG | HEIGHT: 72 IN | HEART RATE: 90 BPM | BODY MASS INDEX: 38.01 KG/M2

## 2021-07-02 VITALS
HEIGHT: 72 IN | DIASTOLIC BLOOD PRESSURE: 96 MMHG | SYSTOLIC BLOOD PRESSURE: 149 MMHG | HEART RATE: 102 BPM | BODY MASS INDEX: 39.39 KG/M2 | TEMPERATURE: 96.2 F

## 2021-07-02 VITALS — HEIGHT: 72 IN | BODY MASS INDEX: 38.31 KG/M2 | TEMPERATURE: 96.7 F

## 2021-07-02 VITALS
TEMPERATURE: 96.9 F | HEIGHT: 72 IN | HEART RATE: 68 BPM | BODY MASS INDEX: 39.06 KG/M2 | SYSTOLIC BLOOD PRESSURE: 124 MMHG | DIASTOLIC BLOOD PRESSURE: 76 MMHG

## 2021-07-02 VITALS — HEIGHT: 72 IN | TEMPERATURE: 97.1 F | BODY MASS INDEX: 38.62 KG/M2

## 2021-07-02 VITALS — HEIGHT: 72 IN | TEMPERATURE: 98.2 F | BODY MASS INDEX: 39.39 KG/M2

## 2021-07-02 VITALS
BODY MASS INDEX: 38.03 KG/M2 | TEMPERATURE: 97.4 F | WEIGHT: 280.8 LBS | HEIGHT: 72 IN | DIASTOLIC BLOOD PRESSURE: 66 MMHG | HEART RATE: 63 BPM | SYSTOLIC BLOOD PRESSURE: 139 MMHG

## 2021-07-02 VITALS
HEART RATE: 68 BPM | HEIGHT: 72 IN | BODY MASS INDEX: 38.73 KG/M2 | DIASTOLIC BLOOD PRESSURE: 76 MMHG | SYSTOLIC BLOOD PRESSURE: 116 MMHG

## 2021-07-02 VITALS — TEMPERATURE: 96.9 F | HEIGHT: 72 IN | BODY MASS INDEX: 38.62 KG/M2

## 2021-07-14 ENCOUNTER — ANTICOAGULATION VISIT (OUTPATIENT)
Dept: FAMILY MEDICINE CLINIC | Age: 56
End: 2021-07-14

## 2021-07-14 VITALS — SYSTOLIC BLOOD PRESSURE: 125 MMHG | HEART RATE: 69 BPM | DIASTOLIC BLOOD PRESSURE: 75 MMHG

## 2021-07-14 DIAGNOSIS — Z95.2 HX OF AORTIC VALVE REPLACEMENT, MECHANICAL: Primary | ICD-10-CM

## 2021-07-14 LAB — INR PPP: 2.9 (ref 2.5–3.5)

## 2021-07-14 PROCEDURE — 36416 COLLJ CAPILLARY BLOOD SPEC: CPT | Performed by: FAMILY MEDICINE

## 2021-07-14 PROCEDURE — 85610 PROTHROMBIN TIME: CPT | Performed by: FAMILY MEDICINE

## 2021-07-19 ENCOUNTER — TELEPHONE (OUTPATIENT)
Dept: UROLOGY | Facility: CLINIC | Age: 56
End: 2021-07-19

## 2021-07-19 DIAGNOSIS — R31.29 MICROSCOPIC HEMATURIA: ICD-10-CM

## 2021-07-19 DIAGNOSIS — Q61.3 POLYCYSTIC KIDNEY DISEASE: Primary | ICD-10-CM

## 2021-07-19 DIAGNOSIS — N20.0 KIDNEY STONE: ICD-10-CM

## 2021-07-19 NOTE — TELEPHONE ENCOUNTER
Patient scheduled his appointment, and needs a CT ordered through [facility].  The order is not in [place] or [facility].  He is aware that the [facility] will call to schedule after the order is placed.

## 2021-08-18 ENCOUNTER — ANTICOAGULATION VISIT (OUTPATIENT)
Dept: FAMILY MEDICINE CLINIC | Age: 56
End: 2021-08-18

## 2021-08-18 ENCOUNTER — TELEPHONE (OUTPATIENT)
Dept: FAMILY MEDICINE CLINIC | Age: 56
End: 2021-08-18

## 2021-08-18 VITALS — HEART RATE: 58 BPM | SYSTOLIC BLOOD PRESSURE: 157 MMHG | DIASTOLIC BLOOD PRESSURE: 83 MMHG

## 2021-08-18 DIAGNOSIS — Z95.2 HX OF AORTIC VALVE REPLACEMENT, MECHANICAL: Primary | ICD-10-CM

## 2021-08-18 LAB — INR PPP: 1.8 (ref 2–3)

## 2021-08-18 PROCEDURE — 85610 PROTHROMBIN TIME: CPT | Performed by: FAMILY MEDICINE

## 2021-08-18 PROCEDURE — 93793 ANTICOAG MGMT PT WARFARIN: CPT | Performed by: FAMILY MEDICINE

## 2021-08-18 PROCEDURE — 36416 COLLJ CAPILLARY BLOOD SPEC: CPT | Performed by: FAMILY MEDICINE

## 2021-08-19 RX ORDER — PRAVASTATIN SODIUM 20 MG
TABLET ORAL
Qty: 90 TABLET | Refills: 1 | Status: SHIPPED | OUTPATIENT
Start: 2021-08-19 | End: 2022-01-10 | Stop reason: SDUPTHER

## 2021-08-26 ENCOUNTER — OFFICE VISIT (OUTPATIENT)
Dept: FAMILY MEDICINE CLINIC | Age: 56
End: 2021-08-26

## 2021-08-26 VITALS
TEMPERATURE: 98.2 F | DIASTOLIC BLOOD PRESSURE: 52 MMHG | WEIGHT: 278.2 LBS | SYSTOLIC BLOOD PRESSURE: 116 MMHG | BODY MASS INDEX: 38.95 KG/M2 | HEART RATE: 56 BPM | HEIGHT: 71 IN

## 2021-08-26 DIAGNOSIS — J10.1 INFLUENZA A: ICD-10-CM

## 2021-08-26 DIAGNOSIS — R05.9 COUGH: ICD-10-CM

## 2021-08-26 DIAGNOSIS — H61.22 IMPACTED CERUMEN OF LEFT EAR: ICD-10-CM

## 2021-08-26 DIAGNOSIS — Z95.2 HX OF AORTIC VALVE REPLACEMENT, MECHANICAL: Primary | ICD-10-CM

## 2021-08-26 PROBLEM — E78.5 HYPERLIPIDEMIA: Status: ACTIVE | Noted: 2021-08-26

## 2021-08-26 PROBLEM — I10 BENIGN ESSENTIAL HTN: Status: ACTIVE | Noted: 2021-08-26

## 2021-08-26 PROBLEM — I71.21 ASCENDING AORTIC ANEURYSM: Status: ACTIVE | Noted: 2021-08-26

## 2021-08-26 PROBLEM — I50.9 CONGESTIVE HEART FAILURE: Status: ACTIVE | Noted: 2021-08-26

## 2021-08-26 PROBLEM — Q61.9 CONGENITAL CYSTIC KIDNEY DISEASE: Status: ACTIVE | Noted: 2021-08-26

## 2021-08-26 LAB
EXPIRATION DATE: ABNORMAL
EXPIRATION DATE: NORMAL
FLUAV AG UPPER RESP QL IA.RAPID: DETECTED
FLUBV AG UPPER RESP QL IA.RAPID: NOT DETECTED
INR PPP: 1.8 (ref 2.5–3.5)
INTERNAL CONTROL: ABNORMAL
INTERNAL CONTROL: NORMAL
Lab: ABNORMAL
Lab: NORMAL
S PYO AG THROAT QL: NEGATIVE
SARS-COV-2 AG UPPER RESP QL IA.RAPID: NOT DETECTED

## 2021-08-26 PROCEDURE — 85610 PROTHROMBIN TIME: CPT | Performed by: NURSE PRACTITIONER

## 2021-08-26 PROCEDURE — 69209 REMOVE IMPACTED EAR WAX UNI: CPT | Performed by: NURSE PRACTITIONER

## 2021-08-26 PROCEDURE — 36416 COLLJ CAPILLARY BLOOD SPEC: CPT | Performed by: NURSE PRACTITIONER

## 2021-08-26 PROCEDURE — 87880 STREP A ASSAY W/OPTIC: CPT | Performed by: NURSE PRACTITIONER

## 2021-08-26 PROCEDURE — 87081 CULTURE SCREEN ONLY: CPT | Performed by: NURSE PRACTITIONER

## 2021-08-26 PROCEDURE — 87428 SARSCOV & INF VIR A&B AG IA: CPT | Performed by: NURSE PRACTITIONER

## 2021-08-26 PROCEDURE — 99214 OFFICE O/P EST MOD 30 MIN: CPT | Performed by: NURSE PRACTITIONER

## 2021-08-26 RX ORDER — METOPROLOL SUCCINATE 100 MG/1
TABLET, EXTENDED RELEASE ORAL
COMMUNITY
End: 2021-09-17

## 2021-08-26 RX ORDER — RISPERIDONE 3 MG/1
2 TABLET ORAL
COMMUNITY
Start: 2021-07-14

## 2021-08-26 RX ORDER — DEXTROMETHORPHAN HYDROBROMIDE AND PROMETHAZINE HYDROCHLORIDE 15; 6.25 MG/5ML; MG/5ML
5 SYRUP ORAL 4 TIMES DAILY PRN
Qty: 180 ML | Refills: 0 | Status: SHIPPED | OUTPATIENT
Start: 2021-08-26 | End: 2022-03-23

## 2021-08-26 RX ORDER — PRAMIPEXOLE DIHYDROCHLORIDE 0.5 MG/1
0.5 TABLET ORAL 2 TIMES DAILY
COMMUNITY
Start: 2021-07-14

## 2021-08-26 RX ORDER — LOSARTAN POTASSIUM 25 MG/1
TABLET ORAL
COMMUNITY
End: 2022-03-18 | Stop reason: SDUPTHER

## 2021-08-26 RX ORDER — ZOLPIDEM TARTRATE 10 MG/1
TABLET ORAL
COMMUNITY
End: 2022-09-13

## 2021-08-26 RX ORDER — WARFARIN SODIUM 5 MG/1
6 TABLET ORAL
COMMUNITY
End: 2021-10-12

## 2021-08-26 RX ORDER — TEMAZEPAM 30 MG/1
30 CAPSULE ORAL DAILY
COMMUNITY
Start: 2021-08-18 | End: 2023-01-22 | Stop reason: HOSPADM

## 2021-08-26 RX ORDER — LORAZEPAM 1 MG/1
1 TABLET ORAL EVERY 8 HOURS PRN
COMMUNITY
Start: 2021-07-01 | End: 2023-01-22 | Stop reason: HOSPADM

## 2021-08-26 RX ORDER — LITHIUM CARBONATE 450 MG
450 TABLET, EXTENDED RELEASE ORAL 2 TIMES DAILY
COMMUNITY
Start: 2021-07-14

## 2021-08-26 NOTE — PROGRESS NOTES
"Chief Complaint  Sore Throat, Headache, Cough, Nasal Congestion, and URI    Subjective  Freedom is a 56-year-old male here today with a 5-day onset of clear runny nose, cough, and headache.  He is taking Allegra.  Denies fever or shortness of breath or ill contacts.        Freedom Stevens presents to [facility]    Review of Systems   Constitutional: Positive for fatigue. Negative for chills and fever.   HENT: Positive for sore throat.    Respiratory: Positive for cough. Negative for shortness of breath and wheezing.    Cardiovascular: Negative.    Gastrointestinal: Negative.    Neurological: Negative for dizziness and weakness.        Objective   Vital Signs:   Vitals:    08/26/21 1425   BP: 116/52   Pulse: 56   Temp: 98.2 °F (36.8 °C)   TempSrc: Oral   Weight: 126 kg (278 lb 3.2 oz)   Height: 181.6 cm (71.5\")      Physical Exam  Vitals reviewed.   Constitutional:       General: He is not in acute distress.     Appearance: Normal appearance. He is well-developed.   HENT:      Right Ear: Tympanic membrane normal.      Left Ear: There is impacted cerumen.      Mouth/Throat:      Mouth: Mucous membranes are moist.      Pharynx: Oropharynx is clear. No oropharyngeal exudate or posterior oropharyngeal erythema.   Cardiovascular:      Rate and Rhythm: Normal rate and regular rhythm.      Comments: Clicking of mechanical heart valve  Pulmonary:      Effort: Pulmonary effort is normal.      Breath sounds: Normal breath sounds.   Musculoskeletal:         General: Normal range of motion.      Right lower leg: No edema.      Left lower leg: No edema.   Skin:     General: Skin is warm and dry.   Neurological:      General: No focal deficit present.      Mental Status: He is alert.   Psychiatric:         Attention and Perception: Attention normal.         Mood and Affect: Mood and affect normal.         Behavior: Behavior normal.          Result Review :                Assessment and Plan  "   Diagnoses and all orders for this visit:    1. Hx of aortic valve replacement, mechanical (Primary)  -     Protime-INR; Future  -     POC INR    2. Influenza A  Assessment & Plan:  Advise rest, increase fluids, and symptomatic treatment.  Promethazine DM may cause drowsiness.  Okay to use Tylenol.  Follow-up if not improving.  Negative for Covid and strep    Orders:  -     POCT rapid strep A  -     POC INR  -     Beta Strep Culture, Throat - , Throat; Future  -     Beta Strep Culture, Throat - Swab, Throat    3. Cough  -     POCT SARS-CoV-2 Antigen KARLA + Flu  -     POC INR  -     promethazine-dextromethorphan (PROMETHAZINE-DM) 6.25-15 MG/5ML syrup; Take 5 mL by mouth 4 (Four) Times a Day As Needed for Cough. May cause drowsiness  Dispense: 180 mL; Refill: 0    4. Impacted cerumen of left ear  -     Ear Cerumen Removal      Follow Up    No follow-ups on file.  Patient was given instructions and counseling regarding his condition or for health maintenance advice. Please see specific information pulled into the AVS if appropriate.     Ear Cerumen Removal    Date/Time: 8/26/2021 3:59 PM  Performed by: Stephany Carolina LPN  Authorized by: Tracy Arzate, SHAZIA     Anesthesia:  Local Anesthetic: none  Location details: left ear  Patient tolerance: patient tolerated the procedure well with no immediate complications  Procedure type: irrigation   Sedation:  Patient sedated: no

## 2021-08-26 NOTE — ASSESSMENT & PLAN NOTE
Advise rest, increase fluids, and symptomatic treatment.  Promethazine DM may cause drowsiness.  Okay to use Tylenol.  Follow-up if not improving.  Negative for Covid and strep

## 2021-08-26 NOTE — PATIENT INSTRUCTIONS
"Influenza, Adult  Influenza is also called \"the flu.\" It is an infection in the lungs, nose, and throat (respiratory tract). It is caused by a virus. The flu causes symptoms that are similar to symptoms of a cold. It also causes a high fever and body aches.  The flu spreads easily from person to person (is contagious). Getting a flu shot (influenza vaccination) every year is the best way to prevent the flu.  What are the causes?  This condition is caused by the influenza virus. You can get the virus by:  · Breathing in droplets that are in the air from the cough or sneeze of a person who has the virus.  · Touching something that has the virus on it (is contaminated) and then touching your mouth, nose, or eyes.  What increases the risk?  Certain things may make you more likely to get the flu. These include:  · Not washing your hands often.  · Having close contact with many people during cold and flu season.  · Touching your mouth, eyes, or nose without first washing your hands.  · Not getting a flu shot every year.  You may have a higher risk for the flu, along with serious problems such as a lung infection (pneumonia), if you:  · Are older than 65.  · Are pregnant.  · Have a weakened disease-fighting system (immune system) because of a disease or taking certain medicines.  · Have a long-term (chronic) illness, such as:  ? Heart, kidney, or lung disease.  ? Diabetes.  ? Asthma.  · Have a liver disorder.  · Are very overweight (morbidly obese).  · Have anemia. This is a condition that affects your red blood cells.  What are the signs or symptoms?  Symptoms usually begin suddenly and last 4-14 days. They may include:  · Fever and chills.  · Headaches, body aches, or muscle aches.  · Sore throat.  · Cough.  · Runny or stuffy (congested) nose.  · Chest discomfort.  · Not wanting to eat as much as normal (poor appetite).  · Weakness or feeling tired (fatigue).  · Dizziness.  · Feeling sick to your stomach (nauseous) or " throwing up (vomiting).  How is this treated?  If the flu is found early, you can be treated with medicine that can help reduce how bad the illness is and how long it lasts (antiviral medicine). This may be given by mouth (orally) or through an IV tube.  Taking care of yourself at home can help your symptoms get better. Your doctor may suggest:  · Taking over-the-counter medicines.  · Drinking plenty of fluids.  The flu often goes away on its own. If you have very bad symptoms or other problems, you may be treated in a hospital.  Follow these instructions at home:         Activity  · Rest as needed. Get plenty of sleep.  · Stay home from work or school as told by your doctor.  ? Do not leave home until you do not have a fever for 24 hours without taking medicine.  ? Leave home only to visit your doctor.  Eating and drinking  · Take an ORS (oral rehydration solution). This is a drink that is sold at pharmacies and stores.  · Drink enough fluid to keep your pee (urine) pale yellow.  · Drink clear fluids in small amounts as you are able. Clear fluids include:  ? Water.  ? Ice chips.  ? Fruit juice that has water added (diluted fruit juice).  ? Low-calorie sports drinks.  · Eat bland, easy-to-digest foods in small amounts as you are able. These foods include:  ? Bananas.  ? Applesauce.  ? Rice.  ? Lean meats.  ? Toast.  ? Crackers.  · Do not eat or drink:  ? Fluids that have a lot of sugar or caffeine.  ? Alcohol.  ? Spicy or fatty foods.  General instructions  · Take over-the-counter and prescription medicines only as told by your doctor.  · Use a cool mist humidifier to add moisture to the air in your home. This can make it easier for you to breathe.  · Cover your mouth and nose when you cough or sneeze.  · Wash your hands with soap and water often, especially after you cough or sneeze. If you cannot use soap and water, use alcohol-based hand .  · Keep all follow-up visits as told by your doctor. This is  "important.  How is this prevented?    · Get a flu shot every year. You may get the flu shot in late summer, fall, or winter. Ask your doctor when you should get your flu shot.  · Avoid contact with people who are sick during fall and winter (cold and flu season).  Contact a doctor if:  · You get new symptoms.  · You have:  ? Chest pain.  ? Watery poop (diarrhea).  ? A fever.  · Your cough gets worse.  · You start to have more mucus.  · You feel sick to your stomach.  · You throw up.  Get help right away if you:  · Have shortness of breath.  · Have trouble breathing.  · Have skin or nails that turn a bluish color.  · Have very bad pain or stiffness in your neck.  · Get a sudden headache.  · Get sudden pain in your face or ear.  · Cannot eat or drink without throwing up.  Summary  · Influenza (\"the flu\") is an infection in the lungs, nose, and throat. It is caused by a virus.  · Take over-the-counter and prescription medicines only as told by your doctor.  · Getting a flu shot every year is the best way to avoid getting the flu.  This information is not intended to replace advice given to you by your health care provider. Make sure you discuss any questions you have with your health care provider.  Document Revised: 06/05/2019 Document Reviewed: 06/05/2019  [employer] Patient Education © 2021 [employer]    "

## 2021-08-29 LAB — BACTERIA SPEC AEROBE CULT: NORMAL

## 2021-09-01 ENCOUNTER — HOSPITAL ENCOUNTER (OUTPATIENT)
Dept: CT IMAGING | Facility: HOSPITAL | Age: 56
Discharge: HOME | End: 2021-09-01
Admitting: UROLOGY

## 2021-09-01 DIAGNOSIS — R31.29 MICROSCOPIC HEMATURIA: ICD-10-CM

## 2021-09-01 DIAGNOSIS — N20.0 KIDNEY STONE: ICD-10-CM

## 2021-09-01 DIAGNOSIS — Q61.3 POLYCYSTIC KIDNEY DISEASE: ICD-10-CM

## 2021-09-01 LAB — CREAT BLDA-MCNC: 1 MG/DL

## 2021-09-01 PROCEDURE — 74178 CT ABD&PLV WO CNTR FLWD CNTR: CPT

## 2021-09-01 PROCEDURE — 82565 ASSAY OF CREATININE: CPT

## 2021-09-01 PROCEDURE — 0 IOPAMIDOL PER 1 ML: Performed by: UROLOGY

## 2021-09-01 RX ADMIN — IOPAMIDOL 100 ML: 755 INJECTION, SOLUTION INTRAVENOUS at 13:42

## 2021-09-03 ENCOUNTER — ANTICOAGULATION VISIT (OUTPATIENT)
Dept: FAMILY MEDICINE CLINIC | Age: 56
End: 2021-09-03

## 2021-09-03 VITALS — DIASTOLIC BLOOD PRESSURE: 82 MMHG | SYSTOLIC BLOOD PRESSURE: 150 MMHG | HEART RATE: 52 BPM

## 2021-09-03 DIAGNOSIS — Z95.2 HX OF AORTIC VALVE REPLACEMENT, MECHANICAL: Primary | ICD-10-CM

## 2021-09-03 LAB — INR PPP: 2 (ref 2.5–3.5)

## 2021-09-03 PROCEDURE — 85610 PROTHROMBIN TIME: CPT | Performed by: FAMILY MEDICINE

## 2021-09-03 PROCEDURE — 36416 COLLJ CAPILLARY BLOOD SPEC: CPT | Performed by: FAMILY MEDICINE

## 2021-09-10 ENCOUNTER — ANTICOAGULATION VISIT (OUTPATIENT)
Dept: FAMILY MEDICINE CLINIC | Age: 56
End: 2021-09-10

## 2021-09-10 VITALS — DIASTOLIC BLOOD PRESSURE: 72 MMHG | HEART RATE: 58 BPM | SYSTOLIC BLOOD PRESSURE: 127 MMHG

## 2021-09-10 DIAGNOSIS — Z95.2 HX OF AORTIC VALVE REPLACEMENT, MECHANICAL: Primary | ICD-10-CM

## 2021-09-10 LAB — INR PPP: 2.3 (ref 2.5–3.5)

## 2021-09-10 PROCEDURE — 36416 COLLJ CAPILLARY BLOOD SPEC: CPT | Performed by: FAMILY MEDICINE

## 2021-09-10 PROCEDURE — 85610 PROTHROMBIN TIME: CPT | Performed by: FAMILY MEDICINE

## 2021-09-13 ENCOUNTER — OFFICE VISIT (OUTPATIENT)
Dept: UROLOGY | Facility: CLINIC | Age: 56
End: 2021-09-13

## 2021-09-13 VITALS
SYSTOLIC BLOOD PRESSURE: 146 MMHG | HEIGHT: 71 IN | DIASTOLIC BLOOD PRESSURE: 74 MMHG | WEIGHT: 279 LBS | BODY MASS INDEX: 39.06 KG/M2

## 2021-09-13 DIAGNOSIS — Q61.9 CONGENITAL CYSTIC KIDNEY DISEASE: Primary | ICD-10-CM

## 2021-09-13 LAB
BILIRUB BLD-MCNC: NEGATIVE MG/DL
CLARITY, POC: CLEAR
COLOR UR: YELLOW
GLUCOSE UR STRIP-MCNC: NEGATIVE MG/DL
KETONES UR QL: NEGATIVE
LEUKOCYTE EST, POC: NEGATIVE
NITRITE UR-MCNC: NEGATIVE MG/ML
PH UR: 6 [PH] (ref 5–8)
PROT UR STRIP-MCNC: NEGATIVE MG/DL
RBC # UR STRIP: NEGATIVE /UL
SP GR UR: 1.02 (ref 1–1.03)
UROBILINOGEN UR QL: NORMAL

## 2021-09-13 PROCEDURE — 81003 URINALYSIS AUTO W/O SCOPE: CPT | Performed by: UROLOGY

## 2021-09-13 PROCEDURE — 99213 OFFICE O/P EST LOW 20 MIN: CPT | Performed by: UROLOGY

## 2021-09-13 NOTE — PROGRESS NOTES
"Chief Complaint  Follow-up (Annual CT)    Subjective          Freeodm Stevens presents to [facility]  Patient is here for follow-up of polycystic kidney disease.  His creatinine has been normal and is checked every 6 months.    He recently had a repeat CT scan to follow-up on his polycystic kidney disease.  CT scan findings are below.    CT SCAN FINDINGS:   Abdomen without contrast:  Hepatic steatosis.  Previous cholecystectomy.  There are few tiny   nonobstructing renal calculi.  No radiodense ureteral calculus.       Pelvis without contrast:  No radiodense bladder calculus.       Abdomen with contrast:  Kidneys enhance symmetrically.  There are multiple small bilateral renal   cysts.  A dominant cyst in the anterior left kidney measures 4.2 cm.  No enhancing renal mass.     Symmetric excretion of contrast.  The distal ureters are not well opacified.  No abnormal filling   defect in the opacified segments.       Liver spleen, adrenal glands, pancreas show no acute abnormality.  Bowel loops are nondilated.       Pelvis with contrast:  No pelvic mass or fluid.  No bladder mass.  No aggressive appearing bone   change.         CONCLUSION: No specific finding to explain the patient's microscopic hematuria.       Multiple renal cysts, majority are small.  Tiny nonobstructing renal calculi.     Objective   Vital Signs:   /74   Ht 180.3 cm (71\")   Wt 127 kg (279 lb)   BMI 38.91 kg/m²     Physical Exam  Vitals and nursing note reviewed.   Constitutional:       Appearance: Normal appearance. He is well-developed.   Pulmonary:      Effort: Pulmonary effort is normal.      Breath sounds: Normal air entry.   Neurological:      Mental Status: He is alert and oriented to person, place, and time.      Motor: Motor function is intact.   Psychiatric:         Mood and Affect: Mood normal.         Behavior: Behavior normal.        Result Review :     BMP    BMP 11/11/20 3/11/21 9/1/21   Glucose 121 " (A) 165 (A)    BUN 18 12    Creatinine 1.10 0.91 1.00   Sodium 141 143    Potassium 4.4 4.1    Chloride 104 105    CO2 26 22    Calcium 9.7 9.8    (A) Abnormal value       Comments are available for some flowsheets but are not being displayed.           Data reviewed: Radiologic studies CT scan           Results for orders placed or performed in visit on 09/13/21   POC Urinalysis Dipstick, Automated    Specimen: Urine   Result Value Ref Range    Color Yellow Yellow, Straw, Dark Yellow, Holli    Clarity, UA Clear Clear    Specific Gravity  1.020 1.005 - 1.030    pH, Urine 6.0 5.0 - 8.0    Leukocytes Negative Negative    Nitrite, UA Negative Negative    Protein, POC Negative Negative mg/dL    Glucose, UA Negative Negative, 1000 mg/dL (3+) mg/dL    Ketones, UA Negative Negative    Urobilinogen, UA Normal Normal    Bilirubin Negative Negative    Blood, UA Negative Negative        Assessment and Plan    Diagnoses and all orders for this visit:    1. Congenital cystic kidney disease (Primary)  Assessment & Plan:  We will repeat his CT scan in 1 year.  I will see him at that time.    Orders:  -     POC Urinalysis Dipstick, Automated  -     CT Abdomen With & Without Contrast; Future    I spent 25 minutes caring for Freedom on this date of service. This time includes time spent by me in the following activities:preparing for the visit, reviewing tests, obtaining and/or reviewing a separately obtained history, ordering medications, tests, or procedures and documenting information in the medical record     Follow Up   No follow-ups on file.  Patient was given instructions and counseling regarding his condition or for health maintenance advice. Please see specific information pulled into the AVS if appropriate.

## 2021-09-16 ENCOUNTER — OFFICE VISIT (OUTPATIENT)
Dept: FAMILY MEDICINE CLINIC | Age: 56
End: 2021-09-16

## 2021-09-16 VITALS
HEART RATE: 72 BPM | TEMPERATURE: 98.2 F | DIASTOLIC BLOOD PRESSURE: 74 MMHG | WEIGHT: 276.4 LBS | BODY MASS INDEX: 38.69 KG/M2 | SYSTOLIC BLOOD PRESSURE: 136 MMHG | HEIGHT: 71 IN

## 2021-09-16 DIAGNOSIS — E11.9 TYPE 2 DIABETES MELLITUS WITHOUT COMPLICATION, WITHOUT LONG-TERM CURRENT USE OF INSULIN (HCC): Primary | ICD-10-CM

## 2021-09-16 DIAGNOSIS — Z23 ENCOUNTER FOR IMMUNIZATION: ICD-10-CM

## 2021-09-16 DIAGNOSIS — I71.21 ASCENDING AORTIC ANEURYSM (HCC): ICD-10-CM

## 2021-09-16 DIAGNOSIS — F31.61 BIPOLAR DISORDER, CURRENT EPISODE MIXED, MILD (HCC): ICD-10-CM

## 2021-09-16 DIAGNOSIS — E78.2 MIXED HYPERLIPIDEMIA: ICD-10-CM

## 2021-09-16 DIAGNOSIS — I10 ESSENTIAL HYPERTENSION: ICD-10-CM

## 2021-09-16 PROBLEM — G89.29 CHRONIC LOW BACK PAIN: Status: ACTIVE | Noted: 2021-09-16

## 2021-09-16 PROBLEM — G47.33 OBSTRUCTIVE SLEEP APNEA SYNDROME: Status: ACTIVE | Noted: 2021-09-16

## 2021-09-16 PROBLEM — M54.50 CHRONIC LOW BACK PAIN: Status: ACTIVE | Noted: 2021-09-16

## 2021-09-16 PROBLEM — E55.9 VITAMIN D DEFICIENCY: Status: ACTIVE | Noted: 2021-09-16

## 2021-09-16 PROBLEM — J10.1 INFLUENZA A: Status: RESOLVED | Noted: 2021-08-26 | Resolved: 2021-09-16

## 2021-09-16 PROBLEM — I50.9 CONGESTIVE HEART FAILURE: Status: RESOLVED | Noted: 2021-08-26 | Resolved: 2021-09-16

## 2021-09-16 PROBLEM — F41.9 ANXIETY: Status: ACTIVE | Noted: 2021-09-16

## 2021-09-16 PROBLEM — R05.9 COUGH: Status: RESOLVED | Noted: 2021-08-26 | Resolved: 2021-09-16

## 2021-09-16 PROCEDURE — 90686 IIV4 VACC NO PRSV 0.5 ML IM: CPT | Performed by: FAMILY MEDICINE

## 2021-09-16 PROCEDURE — 99214 OFFICE O/P EST MOD 30 MIN: CPT | Performed by: FAMILY MEDICINE

## 2021-09-16 PROCEDURE — 90471 IMMUNIZATION ADMIN: CPT | Performed by: FAMILY MEDICINE

## 2021-09-16 NOTE — PROGRESS NOTES
Chief Complaint  Med Refill (6 month follow up)    Subjective          Freedom Stevens presents to [facility] today for routine f/u of chronic issues.    He was diagnosed with flu B at the end of August.  He has recovered from that.  Fully vaccinated against COVID.    He is on metoprolol succinate and losartan for HTN.  BP has been well controlled.  He denies CP, palpitations, SOB.    He is on pravastatin for hyperlipidemia.  He denies myalgias.        He is on warfarin for anticoagulation of a mechanical aortic valve.  Following with Dr. Tellez Cards once a year with an upcoming appt in December.  We are managing the INR.  Last checked a week ago.      He follows with Dr. Mccauley for bipolar disorder with both depressed mood and anxiety.  He goes back there next month.  He had some trouble sleeping last week.  He is on a complex regimen of lithium, lorazepam, risperidone, pramipexole, and temazepam.  Sx have been pretty well controlled.      He has DORIAN but cannot tolerate CPAP.      Current Outpatient Medications:   •  lithium (ESKALITH) 450 MG CR tablet, Take 450 mg by mouth 2 (Two) Times a Day., Disp: , Rfl:   •  LORazepam (ATIVAN) 1 MG tablet, TAKE 1 TABLET BY MOUTH THREE TIMES DAILY AND 1 TABLET AS NEEDED MUST LAST 30 DAYS, Disp: , Rfl:   •  losartan (COZAAR) 25 MG tablet, losartan 25 mg oral tablet take 1 tablet (25 mg) by oral route once daily   Active, Disp: , Rfl:   •  metoprolol succinate XL (TOPROL-XL) 100 MG 24 hr tablet, metoprolol succinate 100 mg oral tablet extended release 24 hr take 1 tablet (100 mg) by oral route once daily   Active, Disp: , Rfl:   •  pramipexole (MIRAPEX) 0.5 MG tablet, Take 0.5 mg by mouth 2 (Two) Times a Day., Disp: , Rfl:   •  pravastatin (PRAVACHOL) 20 MG tablet, TAKE 1 TABLET BY MOUTH ONCE DAILY AT BEDTIME, Disp: 90 tablet, Rfl: 1  •  promethazine-dextromethorphan (PROMETHAZINE-DM) 6.25-15 MG/5ML syrup, Take 5 mL by mouth 4 (Four)  "Times a Day As Needed for Cough. May cause drowsiness, Disp: 180 mL, Rfl: 0  •  risperiDONE (risperDAL) 3 MG tablet, Take 3 mg by mouth every night at bedtime., Disp: , Rfl:   •  temazepam (RESTORIL) 30 MG capsule, Take 30 mg by mouth Daily., Disp: , Rfl:   •  warfarin (COUMADIN) 5 MG tablet, Take 6 mg by mouth., Disp: , Rfl:   •  zolpidem (AMBIEN) 10 MG tablet, zolpidem oral tablet 10 mg take 1 tablet by oral route daily as needed   Suspended, Disp: , Rfl:     Allergies:  Shellfish-derived products      Objective   Vital Signs:   /75 (BP Location: Left arm, Patient Position: Sitting)   Pulse 70   Temp 98.2 °F (36.8 °C) (Oral)   Ht 180.3 cm (70.98\")   Wt 125 kg (276 lb 6.4 oz)   BMI 38.57 kg/m²     Physical Exam  Vitals reviewed.   Constitutional:       General: He is not in acute distress.     Appearance: Normal appearance. He is well-developed.   HENT:      Head: Normocephalic and atraumatic.      Right Ear: External ear normal.      Left Ear: External ear normal.      Nose: Nose normal.      Mouth/Throat:      Mouth: Mucous membranes are moist.   Eyes:      Extraocular Movements: Extraocular movements intact.      Conjunctiva/sclera: Conjunctivae normal.      Pupils: Pupils are equal, round, and reactive to light.   Cardiovascular:      Rate and Rhythm: Normal rate and regular rhythm.      Heart sounds: No murmur heard.        Comments: +click  Pulmonary:      Effort: Pulmonary effort is normal.      Breath sounds: Normal breath sounds. No wheezing, rhonchi or rales.   Abdominal:      General: Bowel sounds are normal. There is no distension.      Palpations: Abdomen is soft.      Tenderness: There is no abdominal tenderness.   Musculoskeletal:         General: Normal range of motion.   Neurological:      Mental Status: He is alert.   Psychiatric:         Mood and Affect: Affect normal.             Assessment and Plan    Diagnoses and all orders for this visit:    1. Type 2 diabetes mellitus without " complication, without long-term current use of insulin (CMS/HCC) (Primary)  -     Comprehensive Metabolic Panel; Future  -     Lipid Panel; Future  -     Hemoglobin A1c; Future    2. Bipolar disorder, current episode mixed, mild (CMS/HCC)    3. Essential hypertension    4. Mixed hyperlipidemia    5. Ascending aortic aneurysm (CMS/HCC)    6. Encounter for immunization  -     FluLaval >6 Months        Follow Up   Return in about 6 months (around 3/16/2022) for Annual physical.  Patient was given instructions and counseling regarding his condition or for health maintenance advice. Please see specific information pulled into the AVS if appropriate.

## 2021-09-17 RX ORDER — METOPROLOL SUCCINATE 100 MG/1
TABLET, EXTENDED RELEASE ORAL
Qty: 90 TABLET | Refills: 0 | Status: SHIPPED | OUTPATIENT
Start: 2021-09-17 | End: 2022-01-10 | Stop reason: SDUPTHER

## 2021-09-17 RX ORDER — AMLODIPINE BESYLATE 5 MG/1
TABLET ORAL
Qty: 90 TABLET | Refills: 0 | Status: SHIPPED | OUTPATIENT
Start: 2021-09-17 | End: 2022-01-10 | Stop reason: SDUPTHER

## 2021-09-28 ENCOUNTER — TELEPHONE (OUTPATIENT)
Dept: FAMILY MEDICINE CLINIC | Age: 56
End: 2021-09-28

## 2021-09-29 ENCOUNTER — ANTICOAGULATION VISIT (OUTPATIENT)
Dept: FAMILY MEDICINE CLINIC | Age: 56
End: 2021-09-29

## 2021-09-29 VITALS — HEART RATE: 83 BPM | SYSTOLIC BLOOD PRESSURE: 141 MMHG | DIASTOLIC BLOOD PRESSURE: 89 MMHG

## 2021-09-29 DIAGNOSIS — Z95.2 HX OF AORTIC VALVE REPLACEMENT, MECHANICAL: Primary | ICD-10-CM

## 2021-09-29 LAB — INR PPP: 2.8 (ref 2.5–3.5)

## 2021-09-29 PROCEDURE — 36416 COLLJ CAPILLARY BLOOD SPEC: CPT | Performed by: FAMILY MEDICINE

## 2021-10-11 ENCOUNTER — LAB (OUTPATIENT)
Dept: LAB | Facility: HOSPITAL | Age: 56
End: 2021-10-11

## 2021-10-11 DIAGNOSIS — E11.9 TYPE 2 DIABETES MELLITUS WITHOUT COMPLICATION, WITHOUT LONG-TERM CURRENT USE OF INSULIN (HCC): ICD-10-CM

## 2021-10-11 LAB
ALBUMIN SERPL-MCNC: 4.6 G/DL (ref 3.5–5.2)
ALBUMIN/GLOB SERPL: 1.7 G/DL
ALP SERPL-CCNC: 66 U/L (ref 39–117)
ALT SERPL W P-5'-P-CCNC: 29 U/L (ref 1–41)
ANION GAP SERPL CALCULATED.3IONS-SCNC: 3.4 MMOL/L (ref 5–15)
AST SERPL-CCNC: 34 U/L (ref 1–40)
BILIRUB SERPL-MCNC: 0.3 MG/DL (ref 0–1.2)
BUN SERPL-MCNC: 11 MG/DL (ref 6–20)
BUN/CREAT SERPL: 10.4 (ref 7–25)
CALCIUM SPEC-SCNC: 9.8 MG/DL (ref 8.6–10.5)
CHLORIDE SERPL-SCNC: 104 MMOL/L (ref 98–107)
CHOLEST SERPL-MCNC: 119 MG/DL (ref 0–200)
CO2 SERPL-SCNC: 30.6 MMOL/L (ref 22–29)
CREAT SERPL-MCNC: 1.06 MG/DL (ref 0.76–1.27)
GFR SERPL CREATININE-BSD FRML MDRD: 72 ML/MIN/1.73
GLOBULIN UR ELPH-MCNC: 2.7 GM/DL
GLUCOSE SERPL-MCNC: 115 MG/DL (ref 65–99)
HBA1C MFR BLD: 5.65 % (ref 4.8–5.6)
HDLC SERPL-MCNC: 32 MG/DL (ref 40–60)
LDLC SERPL CALC-MCNC: 48 MG/DL (ref 0–100)
LDLC/HDLC SERPL: 1.19 {RATIO}
POTASSIUM SERPL-SCNC: 4.2 MMOL/L (ref 3.5–5.2)
PROT SERPL-MCNC: 7.3 G/DL (ref 6–8.5)
SODIUM SERPL-SCNC: 138 MMOL/L (ref 136–145)
TRIGL SERPL-MCNC: 244 MG/DL (ref 0–150)
VLDLC SERPL-MCNC: 39 MG/DL (ref 5–40)

## 2021-10-11 PROCEDURE — 83036 HEMOGLOBIN GLYCOSYLATED A1C: CPT

## 2021-10-11 PROCEDURE — 36415 COLL VENOUS BLD VENIPUNCTURE: CPT

## 2021-10-11 PROCEDURE — 80061 LIPID PANEL: CPT

## 2021-10-11 PROCEDURE — 80053 COMPREHEN METABOLIC PANEL: CPT

## 2021-10-12 RX ORDER — WARFARIN SODIUM 5 MG/1
TABLET ORAL
Qty: 90 TABLET | Refills: 0 | Status: SHIPPED | OUTPATIENT
Start: 2021-10-12 | End: 2021-11-04 | Stop reason: SDUPTHER

## 2021-10-27 ENCOUNTER — ANTICOAGULATION VISIT (OUTPATIENT)
Dept: FAMILY MEDICINE CLINIC | Age: 56
End: 2021-10-27

## 2021-10-27 DIAGNOSIS — Z23 NEED FOR INFLUENZA VACCINATION: ICD-10-CM

## 2021-10-27 DIAGNOSIS — Z95.2 HX OF AORTIC VALVE REPLACEMENT, MECHANICAL: Primary | ICD-10-CM

## 2021-10-27 LAB — INR PPP: 2.1 (ref 2–3)

## 2021-10-27 PROCEDURE — 85610 PROTHROMBIN TIME: CPT | Performed by: FAMILY MEDICINE

## 2021-10-27 PROCEDURE — 36416 COLLJ CAPILLARY BLOOD SPEC: CPT | Performed by: FAMILY MEDICINE

## 2021-10-27 PROCEDURE — 90686 IIV4 VACC NO PRSV 0.5 ML IM: CPT | Performed by: FAMILY MEDICINE

## 2021-10-27 PROCEDURE — 90471 IMMUNIZATION ADMIN: CPT | Performed by: FAMILY MEDICINE

## 2021-11-04 RX ORDER — WARFARIN SODIUM 5 MG/1
5 TABLET ORAL NIGHTLY
Qty: 90 TABLET | Refills: 1 | Status: SHIPPED | OUTPATIENT
Start: 2021-11-04 | End: 2022-01-10 | Stop reason: SDUPTHER

## 2021-11-24 ENCOUNTER — ANTICOAGULATION VISIT (OUTPATIENT)
Dept: FAMILY MEDICINE CLINIC | Age: 56
End: 2021-11-24

## 2021-11-24 VITALS — SYSTOLIC BLOOD PRESSURE: 144 MMHG | DIASTOLIC BLOOD PRESSURE: 89 MMHG | HEART RATE: 82 BPM

## 2021-11-24 DIAGNOSIS — Z95.2 HX OF AORTIC VALVE REPLACEMENT, MECHANICAL: Primary | ICD-10-CM

## 2021-11-24 LAB — INR PPP: 1.8 (ref 2.5–3.5)

## 2021-11-24 PROCEDURE — 36416 COLLJ CAPILLARY BLOOD SPEC: CPT | Performed by: FAMILY MEDICINE

## 2021-11-24 PROCEDURE — 85610 PROTHROMBIN TIME: CPT | Performed by: FAMILY MEDICINE

## 2021-12-01 ENCOUNTER — ANTICOAGULATION VISIT (OUTPATIENT)
Dept: FAMILY MEDICINE CLINIC | Age: 56
End: 2021-12-01

## 2021-12-01 DIAGNOSIS — Z95.2 HX OF AORTIC VALVE REPLACEMENT, MECHANICAL: Primary | ICD-10-CM

## 2021-12-01 LAB — INR PPP: 1.2 (ref 2.5–3.5)

## 2021-12-01 PROCEDURE — 85610 PROTHROMBIN TIME: CPT | Performed by: FAMILY MEDICINE

## 2021-12-01 PROCEDURE — 36416 COLLJ CAPILLARY BLOOD SPEC: CPT | Performed by: FAMILY MEDICINE

## 2021-12-07 ENCOUNTER — LAB (OUTPATIENT)
Dept: LAB | Facility: HOSPITAL | Age: 56
End: 2021-12-07

## 2021-12-07 ENCOUNTER — OFFICE VISIT (OUTPATIENT)
Dept: FAMILY MEDICINE CLINIC | Age: 56
End: 2021-12-07

## 2021-12-07 VITALS
HEIGHT: 71 IN | TEMPERATURE: 97.6 F | SYSTOLIC BLOOD PRESSURE: 146 MMHG | BODY MASS INDEX: 38.58 KG/M2 | WEIGHT: 275.6 LBS | DIASTOLIC BLOOD PRESSURE: 84 MMHG | HEART RATE: 60 BPM

## 2021-12-07 DIAGNOSIS — R11.0 NAUSEA: ICD-10-CM

## 2021-12-07 DIAGNOSIS — R53.83 FATIGUE, UNSPECIFIED TYPE: ICD-10-CM

## 2021-12-07 DIAGNOSIS — R10.12 LUQ PAIN: Primary | ICD-10-CM

## 2021-12-07 DIAGNOSIS — A04.8 H. PYLORI INFECTION: Primary | ICD-10-CM

## 2021-12-07 DIAGNOSIS — R10.12 LUQ PAIN: ICD-10-CM

## 2021-12-07 LAB
ALBUMIN SERPL-MCNC: 4.5 G/DL (ref 3.5–5.2)
ALBUMIN/GLOB SERPL: 1.4 G/DL
ALP SERPL-CCNC: 70 U/L (ref 39–117)
ALT SERPL W P-5'-P-CCNC: 33 U/L (ref 1–41)
ANION GAP SERPL CALCULATED.3IONS-SCNC: 7.7 MMOL/L (ref 5–15)
AST SERPL-CCNC: 39 U/L (ref 1–40)
BACTERIA UR QL AUTO: NORMAL /HPF
BASOPHILS # BLD AUTO: 0.07 10*3/MM3 (ref 0–0.2)
BASOPHILS NFR BLD AUTO: 1 % (ref 0–1.5)
BILIRUB SERPL-MCNC: 0.4 MG/DL (ref 0–1.2)
BILIRUB UR QL STRIP: NEGATIVE
BUN SERPL-MCNC: 16 MG/DL (ref 6–20)
BUN/CREAT SERPL: 14.8 (ref 7–25)
CALCIUM SPEC-SCNC: 10.1 MG/DL (ref 8.6–10.5)
CHLORIDE SERPL-SCNC: 104 MMOL/L (ref 98–107)
CLARITY UR: CLEAR
CO2 SERPL-SCNC: 27.3 MMOL/L (ref 22–29)
COLOR UR: YELLOW
CREAT SERPL-MCNC: 1.08 MG/DL (ref 0.76–1.27)
DEPRECATED RDW RBC AUTO: 41.4 FL (ref 37–54)
EOSINOPHIL # BLD AUTO: 0.37 10*3/MM3 (ref 0–0.4)
EOSINOPHIL NFR BLD AUTO: 5.2 % (ref 0.3–6.2)
ERYTHROCYTE [DISTWIDTH] IN BLOOD BY AUTOMATED COUNT: 13.1 % (ref 12.3–15.4)
EXPIRATION DATE: NORMAL
FLUAV AG UPPER RESP QL IA.RAPID: NOT DETECTED
FLUBV AG UPPER RESP QL IA.RAPID: NOT DETECTED
GFR SERPL CREATININE-BSD FRML MDRD: 70 ML/MIN/1.73
GLOBULIN UR ELPH-MCNC: 3.3 GM/DL
GLUCOSE SERPL-MCNC: 107 MG/DL (ref 65–99)
GLUCOSE UR STRIP-MCNC: NEGATIVE MG/DL
HCT VFR BLD AUTO: 44.5 % (ref 37.5–51)
HGB BLD-MCNC: 15.4 G/DL (ref 13–17.7)
HGB UR QL STRIP.AUTO: NEGATIVE
HYALINE CASTS UR QL AUTO: NORMAL /LPF
IMM GRANULOCYTES # BLD AUTO: 0.03 10*3/MM3 (ref 0–0.05)
IMM GRANULOCYTES NFR BLD AUTO: 0.4 % (ref 0–0.5)
INTERNAL CONTROL: NORMAL
KETONES UR QL STRIP: NEGATIVE
LEUKOCYTE ESTERASE UR QL STRIP.AUTO: NEGATIVE
LIPASE SERPL-CCNC: 45 U/L (ref 13–60)
LYMPHOCYTES # BLD AUTO: 1.16 10*3/MM3 (ref 0.7–3.1)
LYMPHOCYTES NFR BLD AUTO: 16.5 % (ref 19.6–45.3)
Lab: NORMAL
MCH RBC QN AUTO: 30.5 PG (ref 26.6–33)
MCHC RBC AUTO-ENTMCNC: 34.6 G/DL (ref 31.5–35.7)
MCV RBC AUTO: 88.1 FL (ref 79–97)
MONOCYTES # BLD AUTO: 0.58 10*3/MM3 (ref 0.1–0.9)
MONOCYTES NFR BLD AUTO: 8.2 % (ref 5–12)
NEUTROPHILS NFR BLD AUTO: 4.84 10*3/MM3 (ref 1.7–7)
NEUTROPHILS NFR BLD AUTO: 68.7 % (ref 42.7–76)
NITRITE UR QL STRIP: NEGATIVE
NRBC BLD AUTO-RTO: 0 /100 WBC (ref 0–0.2)
PH UR STRIP.AUTO: 6 [PH] (ref 5–8)
PLATELET # BLD AUTO: 191 10*3/MM3 (ref 140–450)
PMV BLD AUTO: 11.2 FL (ref 6–12)
POTASSIUM SERPL-SCNC: 4.5 MMOL/L (ref 3.5–5.2)
PROT SERPL-MCNC: 7.8 G/DL (ref 6–8.5)
PROT UR QL STRIP: ABNORMAL
RBC # BLD AUTO: 5.05 10*6/MM3 (ref 4.14–5.8)
RBC # UR STRIP: NORMAL /HPF
REF LAB TEST METHOD: NORMAL
SARS-COV-2 AG UPPER RESP QL IA.RAPID: NOT DETECTED
SODIUM SERPL-SCNC: 139 MMOL/L (ref 136–145)
SP GR UR STRIP: 1.02 (ref 1–1.03)
SQUAMOUS #/AREA URNS HPF: NORMAL /HPF
UREA BREATH TEST QL: POSITIVE
UROBILINOGEN UR QL STRIP: ABNORMAL
WBC # UR STRIP: NORMAL /HPF
WBC NRBC COR # BLD: 7.05 10*3/MM3 (ref 3.4–10.8)

## 2021-12-07 PROCEDURE — 85025 COMPLETE CBC W/AUTO DIFF WBC: CPT

## 2021-12-07 PROCEDURE — 36415 COLL VENOUS BLD VENIPUNCTURE: CPT

## 2021-12-07 PROCEDURE — 80053 COMPREHEN METABOLIC PANEL: CPT

## 2021-12-07 PROCEDURE — 83690 ASSAY OF LIPASE: CPT

## 2021-12-07 PROCEDURE — 83013 H PYLORI (C-13) BREATH: CPT

## 2021-12-07 PROCEDURE — 99213 OFFICE O/P EST LOW 20 MIN: CPT | Performed by: FAMILY MEDICINE

## 2021-12-07 PROCEDURE — 87428 SARSCOV & INF VIR A&B AG IA: CPT | Performed by: FAMILY MEDICINE

## 2021-12-07 PROCEDURE — 81001 URINALYSIS AUTO W/SCOPE: CPT

## 2021-12-07 RX ORDER — CLARITHROMYCIN 500 MG/1
500 TABLET, COATED ORAL 2 TIMES DAILY
Qty: 28 TABLET | Refills: 0 | Status: SHIPPED | OUTPATIENT
Start: 2021-12-07 | End: 2021-12-21

## 2021-12-07 RX ORDER — ONDANSETRON 4 MG/1
4 TABLET, ORALLY DISINTEGRATING ORAL EVERY 8 HOURS PRN
Qty: 20 TABLET | Refills: 0 | Status: SHIPPED | OUTPATIENT
Start: 2021-12-07 | End: 2023-03-08

## 2021-12-07 RX ORDER — METRONIDAZOLE 500 MG/1
500 TABLET ORAL 2 TIMES DAILY
Qty: 28 TABLET | Refills: 0 | Status: SHIPPED | OUTPATIENT
Start: 2021-12-07 | End: 2021-12-21

## 2021-12-07 RX ORDER — OMEPRAZOLE 20 MG/1
20 CAPSULE, DELAYED RELEASE ORAL 2 TIMES DAILY
Qty: 28 CAPSULE | Refills: 0 | Status: SHIPPED | OUTPATIENT
Start: 2021-12-07 | End: 2021-12-21

## 2021-12-07 RX ORDER — WARFARIN SODIUM 1 MG/1
TABLET ORAL
COMMUNITY
Start: 2021-10-13 | End: 2022-01-10 | Stop reason: SDUPTHER

## 2021-12-07 RX ORDER — AMOXICILLIN 500 MG/1
1000 CAPSULE ORAL 2 TIMES DAILY
Qty: 56 CAPSULE | Refills: 0 | Status: SHIPPED | OUTPATIENT
Start: 2021-12-07 | End: 2021-12-21

## 2021-12-07 NOTE — PROGRESS NOTES
Chief Complaint  Nausea and Diarrhea    Subjective          Freedom Stevens presents to [facility] today for nausea and diarrhea for the past 3 weeks.  He went to a cookout 3 weeks ago.  He woke up the next morning feeling nauseated and with diarrhea.  Ever since then, he has had problems with nausea immediately after eating.  He has not yet actually thrown up.  Then he has diarrhea intermittently with normal bowel movements.  He tried cutting out his morning coffee, but to no avail.  The nausea does not really bother him with lunch or dinner, just breakfast.  He has cereal (Honey Cha or Corn Puffs) every morning.  No blood or mucus in the stool.  He has gotten some lower abdominal pain associated with the nausea (but not the diarrhea).  +Fecal urgency when the diarrhea strikes.  He has not yet been incontinent of stool, but there have been some near misses.  No fevers or chills.  He has a lot of belching.    He does not drink much alcohol in general, although he did have 3 drinks at the cookout the other night.      Current Outpatient Medications:   •  amLODIPine (NORVASC) 5 MG tablet, Take 1 tablet by mouth once daily, Disp: 90 tablet, Rfl: 0  •  lithium (ESKALITH) 450 MG CR tablet, Take 450 mg by mouth 2 (Two) Times a Day., Disp: , Rfl:   •  LORazepam (ATIVAN) 1 MG tablet, TAKE 1 TABLET BY MOUTH THREE TIMES DAILY AND 1 TABLET AS NEEDED MUST LAST 30 DAYS, Disp: , Rfl:   •  losartan (COZAAR) 25 MG tablet, losartan 25 mg oral tablet take 1 tablet (25 mg) by oral route once daily   Active, Disp: , Rfl:   •  metoprolol succinate XL (TOPROL-XL) 100 MG 24 hr tablet, Take 1 tablet by mouth once daily, Disp: 90 tablet, Rfl: 0  •  pramipexole (MIRAPEX) 0.5 MG tablet, Take 0.5 mg by mouth 2 (Two) Times a Day., Disp: , Rfl:   •  pravastatin (PRAVACHOL) 20 MG tablet, TAKE 1 TABLET BY MOUTH ONCE DAILY AT BEDTIME, Disp: 90 tablet, Rfl: 1  •  promethazine-dextromethorphan (PROMETHAZINE-DM)  "6.25-15 MG/5ML syrup, Take 5 mL by mouth 4 (Four) Times a Day As Needed for Cough. May cause drowsiness, Disp: 180 mL, Rfl: 0  •  risperiDONE (risperDAL) 3 MG tablet, Take 3 mg by mouth every night at bedtime., Disp: , Rfl:   •  temazepam (RESTORIL) 30 MG capsule, Take 30 mg by mouth Daily., Disp: , Rfl:   •  warfarin (COUMADIN) 1 MG tablet, , Disp: , Rfl:   •  warfarin (COUMADIN) 5 MG tablet, Take 1 tablet by mouth Every Night. Take 6mg daily, Disp: 90 tablet, Rfl: 1  •  zolpidem (AMBIEN) 10 MG tablet, zolpidem oral tablet 10 mg take 1 tablet by oral route daily as needed   Suspended, Disp: , Rfl:   •  ondansetron ODT (Zofran ODT) 4 MG disintegrating tablet, Place 1 tablet on the tongue Every 8 (Eight) Hours As Needed for Nausea or Vomiting., Disp: 20 tablet, Rfl: 0    Allergies:  Shellfish-derived products      Objective   Vital Signs:   /84 (BP Location: Left arm, Patient Position: Sitting)   Pulse 60   Temp 97.6 °F (36.4 °C) (Oral)   Ht 180.3 cm (70.98\")   Wt 125 kg (275 lb 9.6 oz)   BMI 38.46 kg/m²     Physical Exam  Vitals reviewed.   Constitutional:       General: He is not in acute distress.     Appearance: Normal appearance. He is well-developed.   HENT:      Head: Normocephalic and atraumatic.      Right Ear: External ear normal.      Left Ear: External ear normal.      Nose: Nose normal.      Mouth/Throat:      Mouth: Mucous membranes are moist.   Eyes:      Extraocular Movements: Extraocular movements intact.      Conjunctiva/sclera: Conjunctivae normal.      Pupils: Pupils are equal, round, and reactive to light.   Cardiovascular:      Rate and Rhythm: Normal rate and regular rhythm.      Heart sounds: No murmur heard.      Pulmonary:      Effort: Pulmonary effort is normal.      Breath sounds: Normal breath sounds. No wheezing, rhonchi or rales.   Abdominal:      General: Bowel sounds are normal. There is no distension.      Palpations: Abdomen is soft.      Tenderness: There is abdominal " tenderness (LUQ). There is no guarding or rebound.   Musculoskeletal:         General: Normal range of motion.   Neurological:      Mental Status: He is alert.   Psychiatric:         Mood and Affect: Affect normal.             Assessment and Plan    Diagnoses and all orders for this visit:    1. LUQ pain (Primary)  Assessment & Plan:  Lower abdominal pain, nausea and diarrhea intermittently for the past 3 weeks.  Seems to be worse in the morning after breakfast.  He has one of the 2 same cereals for breakfast every morning, so I have encouraged him first of all to switch his breakfast meal to something like eggs and sausage or eggs and leroy, just to see if eliminating those specific carbohydrates improves his symptoms.  I will send him down to the diagnostic center for belly labs including CBC, CMP, lipase, UA and H. pylori breath testing.  I am also can send him in some Zofran for him to try.  He does have some tenderness to palpation in the left upper quadrant but nothing in the lower abdomen and the belly certainly does not feel acute.  I do not feel an enlarged spleen, but his habitus does make it somewhat difficult to ascertain with certainty.    Orders:  -     Comprehensive Metabolic Panel; Future  -     CBC & Differential; Future  -     Urinalysis With Culture If Indicated -; Future  -     Lipase; Future  -     H. Pylori Breath Test - Breath, Lung; Future    2. Nausea  -     ondansetron ODT (Zofran ODT) 4 MG disintegrating tablet; Place 1 tablet on the tongue Every 8 (Eight) Hours As Needed for Nausea or Vomiting.  Dispense: 20 tablet; Refill: 0    3. Fatigue, unspecified type  -     POCT SARS-CoV-2 Antigen KARLA + Flu      Follow Up   Return if symptoms worsen or fail to improve, for Or 3/15/2022 as scheduled.  Patient was given instructions and counseling regarding his condition or for health maintenance advice. Please see specific information pulled into the AVS if appropriate.

## 2021-12-07 NOTE — ASSESSMENT & PLAN NOTE
Lower abdominal pain, nausea and diarrhea intermittently for the past 3 weeks.  Seems to be worse in the morning after breakfast.  He has one of the 2 same cereals for breakfast every morning, so I have encouraged him first of all to switch his breakfast meal to something like eggs and sausage or eggs and leroy, just to see if eliminating those specific carbohydrates improves his symptoms.  I will send him down to the diagnostic center for belly labs including CBC, CMP, lipase, UA and H. pylori breath testing.  I am also can send him in some Zofran for him to try.  He does have some tenderness to palpation in the left upper quadrant but nothing in the lower abdomen and the belly certainly does not feel acute.  I do not feel an enlarged spleen, but his habitus does make it somewhat difficult to ascertain with certainty.

## 2021-12-08 ENCOUNTER — ANTICOAGULATION VISIT (OUTPATIENT)
Dept: FAMILY MEDICINE CLINIC | Age: 56
End: 2021-12-08

## 2021-12-08 VITALS — DIASTOLIC BLOOD PRESSURE: 90 MMHG | HEART RATE: 74 BPM | SYSTOLIC BLOOD PRESSURE: 142 MMHG

## 2021-12-08 DIAGNOSIS — Z95.2 HX OF AORTIC VALVE REPLACEMENT, MECHANICAL: Primary | ICD-10-CM

## 2021-12-08 LAB — INR PPP: 1.8 (ref 2.5–3.5)

## 2021-12-08 PROCEDURE — 36416 COLLJ CAPILLARY BLOOD SPEC: CPT | Performed by: FAMILY MEDICINE

## 2021-12-08 PROCEDURE — 85610 PROTHROMBIN TIME: CPT | Performed by: FAMILY MEDICINE

## 2021-12-10 ENCOUNTER — ANTICOAGULATION VISIT (OUTPATIENT)
Dept: FAMILY MEDICINE CLINIC | Age: 56
End: 2021-12-10

## 2021-12-10 VITALS — SYSTOLIC BLOOD PRESSURE: 140 MMHG | HEART RATE: 87 BPM | DIASTOLIC BLOOD PRESSURE: 68 MMHG

## 2021-12-10 DIAGNOSIS — Z95.2 HX OF AORTIC VALVE REPLACEMENT, MECHANICAL: Primary | ICD-10-CM

## 2021-12-10 LAB — INR PPP: 2.2 (ref 2.5–3.5)

## 2021-12-10 PROCEDURE — 85610 PROTHROMBIN TIME: CPT | Performed by: FAMILY MEDICINE

## 2021-12-10 PROCEDURE — 36416 COLLJ CAPILLARY BLOOD SPEC: CPT | Performed by: FAMILY MEDICINE

## 2021-12-14 ENCOUNTER — ANTICOAGULATION VISIT (OUTPATIENT)
Dept: FAMILY MEDICINE CLINIC | Age: 56
End: 2021-12-14

## 2021-12-14 VITALS — DIASTOLIC BLOOD PRESSURE: 84 MMHG | SYSTOLIC BLOOD PRESSURE: 143 MMHG | HEART RATE: 88 BPM

## 2021-12-14 DIAGNOSIS — Z95.2 HX OF AORTIC VALVE REPLACEMENT, MECHANICAL: Primary | ICD-10-CM

## 2021-12-14 LAB — INR PPP: 3.2 (ref 2.5–3.5)

## 2021-12-14 PROCEDURE — 85610 PROTHROMBIN TIME: CPT | Performed by: FAMILY MEDICINE

## 2021-12-14 PROCEDURE — 36416 COLLJ CAPILLARY BLOOD SPEC: CPT | Performed by: FAMILY MEDICINE

## 2021-12-17 ENCOUNTER — ANTICOAGULATION VISIT (OUTPATIENT)
Dept: FAMILY MEDICINE CLINIC | Age: 56
End: 2021-12-17

## 2021-12-17 VITALS — DIASTOLIC BLOOD PRESSURE: 93 MMHG | HEART RATE: 79 BPM | SYSTOLIC BLOOD PRESSURE: 154 MMHG

## 2021-12-17 DIAGNOSIS — Z95.2 HX OF AORTIC VALVE REPLACEMENT, MECHANICAL: Primary | ICD-10-CM

## 2021-12-17 LAB — INR PPP: 4 (ref 2.5–3.5)

## 2021-12-17 PROCEDURE — 85610 PROTHROMBIN TIME: CPT | Performed by: FAMILY MEDICINE

## 2021-12-17 PROCEDURE — 36416 COLLJ CAPILLARY BLOOD SPEC: CPT | Performed by: FAMILY MEDICINE

## 2021-12-20 ENCOUNTER — ANTICOAGULATION VISIT (OUTPATIENT)
Dept: FAMILY MEDICINE CLINIC | Age: 56
End: 2021-12-20

## 2021-12-20 DIAGNOSIS — Z95.2 HX OF AORTIC VALVE REPLACEMENT, MECHANICAL: Primary | ICD-10-CM

## 2021-12-20 LAB — INR PPP: 2 (ref 2.5–3.5)

## 2021-12-20 PROCEDURE — 36416 COLLJ CAPILLARY BLOOD SPEC: CPT | Performed by: FAMILY MEDICINE

## 2021-12-20 PROCEDURE — 85610 PROTHROMBIN TIME: CPT | Performed by: FAMILY MEDICINE

## 2021-12-23 ENCOUNTER — ANTICOAGULATION VISIT (OUTPATIENT)
Dept: FAMILY MEDICINE CLINIC | Age: 56
End: 2021-12-23

## 2021-12-23 VITALS — DIASTOLIC BLOOD PRESSURE: 93 MMHG | HEART RATE: 81 BPM | SYSTOLIC BLOOD PRESSURE: 152 MMHG

## 2021-12-23 DIAGNOSIS — Z95.2 HX OF AORTIC VALVE REPLACEMENT, MECHANICAL: Primary | ICD-10-CM

## 2021-12-23 LAB — INR PPP: 1.9 (ref 2.5–3.5)

## 2021-12-23 PROCEDURE — 85610 PROTHROMBIN TIME: CPT | Performed by: FAMILY MEDICINE

## 2021-12-23 PROCEDURE — 36416 COLLJ CAPILLARY BLOOD SPEC: CPT | Performed by: FAMILY MEDICINE

## 2022-01-03 ENCOUNTER — ANTICOAGULATION VISIT (OUTPATIENT)
Dept: FAMILY MEDICINE CLINIC | Age: 57
End: 2022-01-03

## 2022-01-03 DIAGNOSIS — Z95.2 HX OF AORTIC VALVE REPLACEMENT, MECHANICAL: Primary | ICD-10-CM

## 2022-01-03 LAB — INR PPP: 2.3 (ref 2.5–3.5)

## 2022-01-03 PROCEDURE — 36416 COLLJ CAPILLARY BLOOD SPEC: CPT | Performed by: FAMILY MEDICINE

## 2022-01-03 PROCEDURE — 85610 PROTHROMBIN TIME: CPT | Performed by: FAMILY MEDICINE

## 2022-01-10 ENCOUNTER — ANTICOAGULATION VISIT (OUTPATIENT)
Dept: FAMILY MEDICINE CLINIC | Age: 57
End: 2022-01-10

## 2022-01-10 VITALS — SYSTOLIC BLOOD PRESSURE: 167 MMHG | HEART RATE: 78 BPM | DIASTOLIC BLOOD PRESSURE: 96 MMHG

## 2022-01-10 DIAGNOSIS — Z95.2 HX OF AORTIC VALVE REPLACEMENT, MECHANICAL: Primary | ICD-10-CM

## 2022-01-10 LAB — INR PPP: 2.5 (ref 2.5–3.5)

## 2022-01-10 PROCEDURE — 85610 PROTHROMBIN TIME: CPT | Performed by: FAMILY MEDICINE

## 2022-01-10 PROCEDURE — 36416 COLLJ CAPILLARY BLOOD SPEC: CPT | Performed by: FAMILY MEDICINE

## 2022-01-10 RX ORDER — WARFARIN SODIUM 1 MG/1
1 TABLET ORAL NIGHTLY
Qty: 90 TABLET | Refills: 0 | Status: SHIPPED | OUTPATIENT
Start: 2022-01-10 | End: 2022-04-15

## 2022-01-10 RX ORDER — WARFARIN SODIUM 5 MG/1
5 TABLET ORAL NIGHTLY
Qty: 90 TABLET | Refills: 0 | Status: SHIPPED | OUTPATIENT
Start: 2022-01-10 | End: 2022-05-10

## 2022-01-10 RX ORDER — PRAVASTATIN SODIUM 20 MG
20 TABLET ORAL
Qty: 90 TABLET | Refills: 0 | Status: SHIPPED | OUTPATIENT
Start: 2022-01-10 | End: 2022-07-15

## 2022-01-10 RX ORDER — AMLODIPINE BESYLATE 5 MG/1
5 TABLET ORAL DAILY
Qty: 90 TABLET | Refills: 0 | Status: SHIPPED | OUTPATIENT
Start: 2022-01-10 | End: 2022-03-29

## 2022-01-10 RX ORDER — METOPROLOL SUCCINATE 100 MG/1
100 TABLET, EXTENDED RELEASE ORAL DAILY
Qty: 90 TABLET | Refills: 0 | Status: SHIPPED | OUTPATIENT
Start: 2022-01-10 | End: 2022-04-08

## 2022-01-18 ENCOUNTER — ANTICOAGULATION VISIT (OUTPATIENT)
Dept: FAMILY MEDICINE CLINIC | Age: 57
End: 2022-01-18

## 2022-01-18 VITALS — DIASTOLIC BLOOD PRESSURE: 78 MMHG | HEART RATE: 82 BPM | SYSTOLIC BLOOD PRESSURE: 115 MMHG

## 2022-01-18 DIAGNOSIS — Z95.2 HX OF AORTIC VALVE REPLACEMENT, MECHANICAL: Primary | ICD-10-CM

## 2022-01-18 LAB — INR PPP: 3.3 (ref 2.5–3.5)

## 2022-01-18 PROCEDURE — 85610 PROTHROMBIN TIME: CPT | Performed by: FAMILY MEDICINE

## 2022-01-18 PROCEDURE — 36416 COLLJ CAPILLARY BLOOD SPEC: CPT | Performed by: FAMILY MEDICINE

## 2022-01-19 ENCOUNTER — TELEPHONE (OUTPATIENT)
Dept: FAMILY MEDICINE CLINIC | Age: 57
End: 2022-01-19

## 2022-01-19 NOTE — TELEPHONE ENCOUNTER
Pt informed that the amlodipine and coumidin were called in the same day.  The pharm should have both for him to .

## 2022-01-19 NOTE — TELEPHONE ENCOUNTER
Caller: Freedom Stevens    Relationship: Self    Best call back number: [phone]    What is the best time to reach you: ANY     Who are you requesting to speak with (clinical staff, provider,  specific staff member): CLINICAL    What was the call regarding: PATIENT WAS EXPECTING WARFARIN 5MG TO BE CALLED IN BUT AMLODIPINE 5 MG WAS CALLED IN. PLEASE CLARIFY.    Do you require a callback: YES

## 2022-02-01 ENCOUNTER — ANTICOAGULATION VISIT (OUTPATIENT)
Dept: FAMILY MEDICINE CLINIC | Age: 57
End: 2022-02-01

## 2022-02-01 VITALS — DIASTOLIC BLOOD PRESSURE: 82 MMHG | SYSTOLIC BLOOD PRESSURE: 126 MMHG | HEART RATE: 73 BPM

## 2022-02-01 DIAGNOSIS — Z95.2 HX OF AORTIC VALVE REPLACEMENT, MECHANICAL: Primary | ICD-10-CM

## 2022-02-01 LAB — INR PPP: 3 (ref 2.5–3.5)

## 2022-02-01 PROCEDURE — 93793 ANTICOAG MGMT PT WARFARIN: CPT | Performed by: FAMILY MEDICINE

## 2022-02-01 PROCEDURE — 85610 PROTHROMBIN TIME: CPT | Performed by: FAMILY MEDICINE

## 2022-02-01 PROCEDURE — 36416 COLLJ CAPILLARY BLOOD SPEC: CPT | Performed by: FAMILY MEDICINE

## 2022-03-01 ENCOUNTER — ANTICOAGULATION VISIT (OUTPATIENT)
Dept: FAMILY MEDICINE CLINIC | Age: 57
End: 2022-03-01

## 2022-03-01 VITALS — HEART RATE: 67 BPM | DIASTOLIC BLOOD PRESSURE: 81 MMHG | SYSTOLIC BLOOD PRESSURE: 134 MMHG

## 2022-03-01 DIAGNOSIS — Z95.2 HX OF AORTIC VALVE REPLACEMENT, MECHANICAL: Primary | ICD-10-CM

## 2022-03-01 LAB — INR PPP: 3.7 (ref 2.5–3.5)

## 2022-03-01 PROCEDURE — 93793 ANTICOAG MGMT PT WARFARIN: CPT | Performed by: FAMILY MEDICINE

## 2022-03-01 PROCEDURE — 36416 COLLJ CAPILLARY BLOOD SPEC: CPT | Performed by: FAMILY MEDICINE

## 2022-03-01 PROCEDURE — 85610 PROTHROMBIN TIME: CPT | Performed by: FAMILY MEDICINE

## 2022-03-07 ENCOUNTER — NURSE TRIAGE (OUTPATIENT)
Dept: CALL CENTER | Facility: HOSPITAL | Age: 57
End: 2022-03-07

## 2022-03-07 ENCOUNTER — TELEPHONE (OUTPATIENT)
Dept: FAMILY MEDICINE CLINIC | Age: 57
End: 2022-03-07

## 2022-03-07 NOTE — TELEPHONE ENCOUNTER
"Reviewed guideline with caller, advised he see PCP within 3 days. Warm transfer to [facility] at [facility].     Reason for Disposition  • [1] MODERATE pain (e.g., interferes with normal activities, limping) AND [2] present > 3 days    Additional Information  • Negative: Chest pain  • Negative: Followed an ankle injury  • Negative: Ankle pain is main symptom  • Negative: Swelling of both ankles (i.e., pedal edema)  • Negative: Swelling of calf or leg  • Negative: Small area of LOCALIZED swelling followed an insect bite to the area  • Negative: Difficulty breathing  • Negative: Entire foot is cool or blue in comparison to other side  • Negative: Fever  • Negative: Patient sounds very sick or weak to the triager  • Negative: [1] SEVERE pain (e.g., excruciating, unable to walk) AND [2] not improved after 2 hours of pain medicine  • Negative: [1] Can't move swollen ankle at all AND [2] no fever  • Negative: [1] Redness AND [2] painful when touched AND [3] no fever  • Negative: [1] Red area or streak [2] large (> 2 in. or 5 cm)  • Negative: [1] Thigh or calf pain AND [2] only 1 side AND [3] present > 1 hour  • Negative: [1] Thigh, calf, or ankle swelling AND [2] only 1 side  • Negative: [1] Thigh, calf, or ankle swelling AND [2] bilateral AND [3] 1 side is more swollen  • Negative: [1] Very swollen ankle AND [2] no fever  • Negative: Looks like a boil, infected sore, deep ulcer or other infected rash (spreading redness, pus)    Answer Assessment - Initial Assessment Questions  1. LOCATION: \"Which ankle is swollen?\" \"Where is the swelling?\"      Right ankle, blood in calf muscle  2. ONSET: \"When did the swelling start?\"      4 weeks ago   3. SIZE: \"How large is the swelling?\"      Larger than other calf  4. PAIN: \"Is there any pain?\" If Yes, ask: \"How bad is it?\" (Scale 1-10; or mild, moderate, severe)    - NONE (0): no pain.    - MILD (1-3): doesn't interfere with normal activities.     - MODERATE (4-7): " "interferes with normal activities (e.g., work or school) or awakens from sleep, limping.     - SEVERE (8-10): excruciating pain, unable to do any normal activities, unable to walk.       mild  5. CAUSE: \"What do you think caused the ankle swelling?\"      States he fell while packing chairs and they fell on him   6. OTHER SYMPTOMS: \"Do you have any other symptoms?\" (e.g., fever, chest pain, difficulty breathing, calf pain)      no  7. PREGNANCY: \"Is there any chance you are pregnant?\" \"When was your last menstrual period?\"      na    Protocols used: ANKLE SWELLING-ADULT-AH      "

## 2022-03-08 ENCOUNTER — ANTICOAGULATION VISIT (OUTPATIENT)
Dept: FAMILY MEDICINE CLINIC | Age: 57
End: 2022-03-08

## 2022-03-08 VITALS — DIASTOLIC BLOOD PRESSURE: 81 MMHG | HEART RATE: 68 BPM | SYSTOLIC BLOOD PRESSURE: 143 MMHG

## 2022-03-08 DIAGNOSIS — Z95.2 HX OF AORTIC VALVE REPLACEMENT, MECHANICAL: Primary | ICD-10-CM

## 2022-03-08 LAB — INR PPP: 2.1 (ref 2.5–3.5)

## 2022-03-08 PROCEDURE — 93793 ANTICOAG MGMT PT WARFARIN: CPT | Performed by: FAMILY MEDICINE

## 2022-03-08 PROCEDURE — 85610 PROTHROMBIN TIME: CPT | Performed by: FAMILY MEDICINE

## 2022-03-08 PROCEDURE — 36416 COLLJ CAPILLARY BLOOD SPEC: CPT | Performed by: FAMILY MEDICINE

## 2022-03-16 ENCOUNTER — ANTICOAGULATION VISIT (OUTPATIENT)
Dept: FAMILY MEDICINE CLINIC | Age: 57
End: 2022-03-16

## 2022-03-16 VITALS — SYSTOLIC BLOOD PRESSURE: 128 MMHG | HEART RATE: 66 BPM | DIASTOLIC BLOOD PRESSURE: 76 MMHG

## 2022-03-16 DIAGNOSIS — Z95.2 HX OF AORTIC VALVE REPLACEMENT, MECHANICAL: Primary | ICD-10-CM

## 2022-03-16 LAB — INR PPP: 2.8 (ref 2.5–3.5)

## 2022-03-16 PROCEDURE — 93793 ANTICOAG MGMT PT WARFARIN: CPT | Performed by: FAMILY MEDICINE

## 2022-03-16 PROCEDURE — 36416 COLLJ CAPILLARY BLOOD SPEC: CPT | Performed by: FAMILY MEDICINE

## 2022-03-16 PROCEDURE — 85610 PROTHROMBIN TIME: CPT | Performed by: FAMILY MEDICINE

## 2022-03-19 RX ORDER — LOSARTAN POTASSIUM 25 MG/1
50 TABLET ORAL 2 TIMES DAILY
Qty: 180 TABLET | Refills: 1 | Status: SHIPPED | OUTPATIENT
Start: 2022-03-19 | End: 2022-09-23 | Stop reason: SDUPTHER

## 2022-03-23 ENCOUNTER — OFFICE VISIT (OUTPATIENT)
Dept: FAMILY MEDICINE CLINIC | Age: 57
End: 2022-03-23

## 2022-03-23 ENCOUNTER — LAB (OUTPATIENT)
Dept: LAB | Facility: HOSPITAL | Age: 57
End: 2022-03-23

## 2022-03-23 VITALS
WEIGHT: 281 LBS | HEIGHT: 71 IN | BODY MASS INDEX: 39.34 KG/M2 | DIASTOLIC BLOOD PRESSURE: 56 MMHG | TEMPERATURE: 98.3 F | HEART RATE: 66 BPM | SYSTOLIC BLOOD PRESSURE: 110 MMHG

## 2022-03-23 DIAGNOSIS — I71.21 ASCENDING AORTIC ANEURYSM: ICD-10-CM

## 2022-03-23 DIAGNOSIS — E11.9 TYPE 2 DIABETES MELLITUS WITHOUT COMPLICATION, WITHOUT LONG-TERM CURRENT USE OF INSULIN: ICD-10-CM

## 2022-03-23 DIAGNOSIS — I10 ESSENTIAL HYPERTENSION: ICD-10-CM

## 2022-03-23 DIAGNOSIS — E11.9 TYPE 2 DIABETES MELLITUS WITHOUT COMPLICATION, WITHOUT LONG-TERM CURRENT USE OF INSULIN: Primary | ICD-10-CM

## 2022-03-23 DIAGNOSIS — F31.61 BIPOLAR DISORDER, CURRENT EPISODE MIXED, MILD: ICD-10-CM

## 2022-03-23 DIAGNOSIS — Z95.2 HX OF AORTIC VALVE REPLACEMENT, MECHANICAL: ICD-10-CM

## 2022-03-23 DIAGNOSIS — E78.2 MIXED HYPERLIPIDEMIA: ICD-10-CM

## 2022-03-23 DIAGNOSIS — Z23 ENCOUNTER FOR IMMUNIZATION: ICD-10-CM

## 2022-03-23 PROBLEM — R11.0 NAUSEA: Status: RESOLVED | Noted: 2021-12-07 | Resolved: 2022-03-23

## 2022-03-23 PROBLEM — R10.12 LUQ PAIN: Status: RESOLVED | Noted: 2021-12-07 | Resolved: 2022-03-23

## 2022-03-23 LAB
ALBUMIN SERPL-MCNC: 4.7 G/DL (ref 3.5–5.2)
ALBUMIN UR-MCNC: 4.8 MG/DL
ALBUMIN/GLOB SERPL: 1.8 G/DL
ALP SERPL-CCNC: 65 U/L (ref 39–117)
ALT SERPL W P-5'-P-CCNC: 31 U/L (ref 1–41)
ANION GAP SERPL CALCULATED.3IONS-SCNC: 8.8 MMOL/L (ref 5–15)
AST SERPL-CCNC: 36 U/L (ref 1–40)
BILIRUB SERPL-MCNC: 0.5 MG/DL (ref 0–1.2)
BUN SERPL-MCNC: 17 MG/DL (ref 6–20)
BUN/CREAT SERPL: 16.5 (ref 7–25)
CALCIUM SPEC-SCNC: 9.5 MG/DL (ref 8.6–10.5)
CHLORIDE SERPL-SCNC: 102 MMOL/L (ref 98–107)
CHOLEST SERPL-MCNC: 111 MG/DL (ref 0–200)
CO2 SERPL-SCNC: 27.2 MMOL/L (ref 22–29)
CREAT SERPL-MCNC: 1.03 MG/DL (ref 0.76–1.27)
CREAT UR-MCNC: 163.8 MG/DL
EGFRCR SERPLBLD CKD-EPI 2021: 84.7 ML/MIN/1.73
GLOBULIN UR ELPH-MCNC: 2.6 GM/DL
GLUCOSE SERPL-MCNC: 221 MG/DL (ref 65–99)
HBA1C MFR BLD: 6.2 % (ref 4.8–5.6)
HDLC SERPL-MCNC: 31 MG/DL (ref 40–60)
LDLC SERPL CALC-MCNC: 55 MG/DL (ref 0–100)
LDLC/HDLC SERPL: 1.65 {RATIO}
MICROALBUMIN/CREAT UR: 29.3 MG/G
POTASSIUM SERPL-SCNC: 4 MMOL/L (ref 3.5–5.2)
PROT SERPL-MCNC: 7.3 G/DL (ref 6–8.5)
SODIUM SERPL-SCNC: 138 MMOL/L (ref 136–145)
TRIGL SERPL-MCNC: 144 MG/DL (ref 0–150)
VLDLC SERPL-MCNC: 25 MG/DL (ref 5–40)

## 2022-03-23 PROCEDURE — 82570 ASSAY OF URINE CREATININE: CPT

## 2022-03-23 PROCEDURE — 80061 LIPID PANEL: CPT

## 2022-03-23 PROCEDURE — 90732 PPSV23 VACC 2 YRS+ SUBQ/IM: CPT | Performed by: FAMILY MEDICINE

## 2022-03-23 PROCEDURE — 99214 OFFICE O/P EST MOD 30 MIN: CPT | Performed by: FAMILY MEDICINE

## 2022-03-23 PROCEDURE — 83036 HEMOGLOBIN GLYCOSYLATED A1C: CPT

## 2022-03-23 PROCEDURE — 82043 UR ALBUMIN QUANTITATIVE: CPT

## 2022-03-23 PROCEDURE — 36415 COLL VENOUS BLD VENIPUNCTURE: CPT

## 2022-03-23 PROCEDURE — 90471 IMMUNIZATION ADMIN: CPT | Performed by: FAMILY MEDICINE

## 2022-03-23 PROCEDURE — 80053 COMPREHEN METABOLIC PANEL: CPT

## 2022-03-23 NOTE — PROGRESS NOTES
Chief Complaint  Diabetes (6 month follow up )    Subjective          Freedom Stevens presents to [facility] today for follow-up on routine issues.    He is on metformin for DM.  He is on a statin, ARB.  He is due for foot exam.  He is UTD on eye exam, last done 12/2021.      He is on amlodipine, losartan and metoprolol succinate for hypertension. His blood pressure has been well controlled.  He denies chest pain, palpitations, or shortness of air.     He is on pravastatin for hyperlipidemia.  No problems with myalgias.     He is on warfarin for anticoagulation of a mechanical aortic valve. He follows with Dr. Telle[facility].  We are managing the INR.      He is following with Dr. Mccauley for bipolar disorder with depressed mood and anxiety.  He is on a complex regimen of lithium, lorazepam, risperidone, pramipexole, zolpidem, and temazepam. His symptoms have been pretty well controlled.      He has DORIAN but has been unable to tolerate CPAP.      Current Outpatient Medications:   •  amLODIPine (NORVASC) 5 MG tablet, Take 1 tablet by mouth Daily., Disp: 90 tablet, Rfl: 0  •  lithium (ESKALITH) 450 MG CR tablet, Take 450 mg by mouth 2 (Two) Times a Day., Disp: , Rfl:   •  LORazepam (ATIVAN) 1 MG tablet, TAKE 1 TABLET BY MOUTH THREE TIMES DAILY AND 1 TABLET AS NEEDED MUST LAST 30 DAYS, Disp: , Rfl:   •  losartan (COZAAR) 25 MG tablet, Take 2 tablets by mouth 2 (Two) Times a Day., Disp: 180 tablet, Rfl: 1  •  metFORMIN (GLUCOPHAGE) 500 MG tablet, Take 500 mg by mouth Daily., Disp: , Rfl:   •  metoprolol succinate XL (TOPROL-XL) 100 MG 24 hr tablet, Take 1 tablet by mouth Daily., Disp: 90 tablet, Rfl: 0  •  ondansetron ODT (Zofran ODT) 4 MG disintegrating tablet, Place 1 tablet on the tongue Every 8 (Eight) Hours As Needed for Nausea or Vomiting., Disp: 20 tablet, Rfl: 0  •  pramipexole (MIRAPEX) 0.5 MG tablet, Take 0.5 mg by mouth 2 (Two) Times a Day., Disp: , Rfl:   •   "pravastatin (PRAVACHOL) 20 MG tablet, Take 1 tablet by mouth every night at bedtime., Disp: 90 tablet, Rfl: 0  •  risperiDONE (risperDAL) 3 MG tablet, Take 3 mg by mouth every night at bedtime., Disp: , Rfl:   •  temazepam (RESTORIL) 30 MG capsule, Take 30 mg by mouth Daily., Disp: , Rfl:   •  warfarin (COUMADIN) 1 MG tablet, Take 1 tablet by mouth Every Night., Disp: 90 tablet, Rfl: 0  •  warfarin (COUMADIN) 5 MG tablet, Take 1 tablet by mouth Every Night. Take 6mg daily, Disp: 90 tablet, Rfl: 0  •  zolpidem (AMBIEN) 10 MG tablet, zolpidem oral tablet 10 mg take 1 tablet by oral route daily as needed   Suspended, Disp: , Rfl:     Allergies:  Shellfish-derived products      Objective   Vital Signs:   Vitals:    03/23/22 1011   BP: 110/56   BP Location: Left arm   Patient Position: Sitting   Pulse: 66   Temp: 98.3 °F (36.8 °C)   TempSrc: Oral   Weight: 127 kg (281 lb)   Height: 180.3 cm (71\")       Physical Exam  Vitals reviewed.   Constitutional:       General: He is not in acute distress.     Appearance: Normal appearance. He is well-developed.   HENT:      Head: Normocephalic and atraumatic.      Right Ear: External ear normal.      Left Ear: External ear normal.   Eyes:      Extraocular Movements: Extraocular movements intact.      Conjunctiva/sclera: Conjunctivae normal.      Pupils: Pupils are equal, round, and reactive to light.   Cardiovascular:      Rate and Rhythm: Normal rate and regular rhythm.      Pulses:           Dorsalis pedis pulses are 2+ on the right side and 2+ on the left side.      Heart sounds: No murmur heard.     Comments: +click  Pulmonary:      Effort: Pulmonary effort is normal.      Breath sounds: Normal breath sounds. No wheezing, rhonchi or rales.   Abdominal:      General: Bowel sounds are normal. There is no distension.      Palpations: Abdomen is soft.      Tenderness: There is no abdominal tenderness.   Musculoskeletal:         General: Normal range of motion.   Feet:      Right " foot:      Protective Sensation: 3 sites tested. 3 sites sensed.      Skin integrity: Skin integrity normal. No ulcer or blister.      Toenail Condition: Right toenails are normal.      Left foot:      Protective Sensation: 3 sites tested. 3 sites sensed.      Skin integrity: Skin integrity normal. No ulcer or blister.      Toenail Condition: Left toenails are normal.      Comments: Diabetic Foot Exam Performed     Neurological:      Mental Status: He is alert.   Psychiatric:         Mood and Affect: Affect normal.             Assessment and Plan    Diagnoses and all orders for this visit:    1. Type 2 diabetes mellitus without complication, without long-term current use of insulin (HCC) (Primary)  Assessment & Plan:  He is on metformin for DM.  No refills needed.  He is on a statin, ARB.  He is due for foot exam; exam today normal.  He is UTD on eye exam, last done 12/2021.  Checking labs.    Orders:  -     Comprehensive Metabolic Panel; Future  -     Lipid Panel; Future  -     Hemoglobin A1c; Future  -     Microalbumin / Creatinine Urine Ratio - Urine, Clean Catch; Future    2. Essential hypertension  Assessment & Plan:  Blood pressure at goal.  Continue amlodipine 5 mg daily, losartan 50 mg twice daily and metoprolol succinate 100 mg daily.  No refills needed.  Checking labs.      3. Mixed hyperlipidemia  Assessment & Plan:  Stable on pravastatin 20 mg daily.  No refills needed.  Checking labs.      4. Ascending aortic aneurysm (HCC)  Assessment & Plan:  Stable.  Following with Dr. Tellez [facility].      5. Bipolar disorder, current episode mixed, mild (HCC)  Assessment & Plan:  Following with Dr. Mccauley.  He manages all of the mental health medications.  Symptoms currently under good control.      6. Encounter for immunization  -     Pneumococcal Polysaccharide Vaccine 23-Valent Greater Than or Equal To 1yo Subcutaneous / IM    7. Hx of aortic valve replacement, mechanical  Assessment & Plan:  We are  managing his INR.  Continue as per flow sheet.        Follow Up   Return in about 6 months (around 9/23/2022) for Annual physical.  Patient was given instructions and counseling regarding his condition or for health maintenance advice. Please see specific information pulled into the AVS if appropriate.

## 2022-03-23 NOTE — ASSESSMENT & PLAN NOTE
Blood pressure at goal.  Continue amlodipine 5 mg daily, losartan 50 mg twice daily and metoprolol succinate 100 mg daily.  No refills needed.  Checking labs.

## 2022-03-23 NOTE — ASSESSMENT & PLAN NOTE
He is on metformin for DM.  No refills needed.  He is on a statin, ARB.  He is due for foot exam; exam today normal.  He is UTD on eye exam, last done 12/2021.  Checking labs.

## 2022-03-23 NOTE — ASSESSMENT & PLAN NOTE
Following with Dr. Mccauley.  He manages all of the mental health medications.  Symptoms currently under good control.

## 2022-03-29 ENCOUNTER — OFFICE VISIT (OUTPATIENT)
Dept: FAMILY MEDICINE CLINIC | Age: 57
End: 2022-03-29

## 2022-03-29 VITALS
HEART RATE: 60 BPM | HEIGHT: 71 IN | DIASTOLIC BLOOD PRESSURE: 74 MMHG | TEMPERATURE: 97.9 F | BODY MASS INDEX: 39.76 KG/M2 | WEIGHT: 284 LBS | SYSTOLIC BLOOD PRESSURE: 145 MMHG

## 2022-03-29 DIAGNOSIS — I10 ESSENTIAL HYPERTENSION: ICD-10-CM

## 2022-03-29 DIAGNOSIS — E11.9 TYPE 2 DIABETES MELLITUS WITHOUT COMPLICATION, WITHOUT LONG-TERM CURRENT USE OF INSULIN: ICD-10-CM

## 2022-03-29 DIAGNOSIS — Z95.2 HX OF AORTIC VALVE REPLACEMENT, MECHANICAL: ICD-10-CM

## 2022-03-29 DIAGNOSIS — R42 DIZZINESS: Primary | ICD-10-CM

## 2022-03-29 LAB
GLUCOSE BLDC GLUCOMTR-MCNC: 137 MG/DL (ref 70–130)
INR PPP: 2.9 (ref 2.5–3.5)

## 2022-03-29 PROCEDURE — 36416 COLLJ CAPILLARY BLOOD SPEC: CPT | Performed by: NURSE PRACTITIONER

## 2022-03-29 PROCEDURE — 85610 PROTHROMBIN TIME: CPT | Performed by: NURSE PRACTITIONER

## 2022-03-29 PROCEDURE — 99214 OFFICE O/P EST MOD 30 MIN: CPT | Performed by: NURSE PRACTITIONER

## 2022-03-29 PROCEDURE — 82947 ASSAY GLUCOSE BLOOD QUANT: CPT | Performed by: NURSE PRACTITIONER

## 2022-03-29 RX ORDER — AMLODIPINE BESYLATE 10 MG/1
10 TABLET ORAL DAILY
Qty: 90 TABLET | Refills: 1 | Status: SHIPPED | OUTPATIENT
Start: 2022-03-29 | End: 2022-09-23

## 2022-03-29 RX ORDER — LANCETS
EACH MISCELLANEOUS
Qty: 102 EACH | Refills: 3 | Status: SHIPPED | OUTPATIENT
Start: 2022-03-29 | End: 2022-09-13

## 2022-03-29 RX ORDER — BLOOD SUGAR DIAGNOSTIC, DRUM
STRIP MISCELLANEOUS
Qty: 25 EACH | Refills: 12 | Status: SHIPPED | OUTPATIENT
Start: 2022-03-29 | End: 2022-03-30 | Stop reason: ALTCHOICE

## 2022-03-29 NOTE — ASSESSMENT & PLAN NOTE
-Since the patient's blood pressure has been elevated at home and is elevated in office today, along with symptoms of dizziness and HA, we will increase amlodipine to 10 mg daily  -The patient was instructed to take BP at home at daily.The patient will keep log of BP's and bring to next visit for review. Lifestyle changes were discussed to help lower BP including lowering salt intake, increasing exercise, and weight loss.  -If blood pressure consistently above 160 systolic he was instructed to seek ER care

## 2022-03-29 NOTE — ASSESSMENT & PLAN NOTE
-It is very likely that his dizziness is caused by hypoglycemia or elevated blood pressure  -His dizziness is alleviated whenever he eats  -He was given a prescription for blood glucose monitoring he will check his blood sugar when the dizziness occurs  -His point-of-care blood glucose was 137, the last time he had something to eat was at 2 AM, he ate a donut  -He has not had any dizzy spells this morning

## 2022-03-29 NOTE — PROGRESS NOTES
"Freedom Stevens presents to [facility] with complaint of  Dizziness    SUBJECTIVE  History of Present Illness    The patient presents today to the office with chief complaint of dizziness. This started yesterday. He says the dizziness is improved when he eats. He has not checked his BG when this occurs as he does not have a BG monitor at home.     Last night his BP was 152/84 and he said he had a HA and dizziness when this occurred.  He was recently seen by his primary care provider and did not have any of these issues at that time.     He denies having N/V/D, fever, CP, SOA, or syncopal events.     Noted that his BP is elevated in office today, compared to previous recent  readings.   OBJECTIVE  Vital Signs:   /74   Pulse 60   Temp 97.9 °F (36.6 °C) (Oral)   Ht 180.3 cm (71\")   Wt 129 kg (284 lb)   BMI 39.61 kg/m²       Physical Exam  Constitutional:       General: He is not in acute distress.     Appearance: Normal appearance. He is not ill-appearing.   HENT:      Head: Normocephalic and atraumatic.      Nose: Nose normal.      Mouth/Throat:      Mouth: Mucous membranes are moist.   Cardiovascular:      Rate and Rhythm: Normal rate and regular rhythm.      Pulses: Normal pulses.      Heart sounds: Normal heart sounds.      Comments: +for clicking   Pulmonary:      Effort: Pulmonary effort is normal. No respiratory distress.      Breath sounds: Normal breath sounds.   Chest:      Chest wall: No tenderness.   Musculoskeletal:         General: Normal range of motion.      Cervical back: Normal range of motion and neck supple.   Skin:     General: Skin is warm and dry.      Capillary Refill: Capillary refill takes less than 2 seconds.   Neurological:      General: No focal deficit present.      Mental Status: He is alert and oriented to person, place, and time. Mental status is at baseline.   Psychiatric:         Mood and Affect: Mood normal.         Behavior: Behavior " normal.          Results Review:  The following data was reviewed by Dayana Judd, SHAZIA [unfilled] 08:37 EDT.    Common labs    Common Labsle 10/11/21 10/11/21 10/11/21 12/7/21 12/7/21 3/23/22 3/23/22 3/23/22 3/23/22    0859 0859 0859 1701 1701 1111 1111 1111 1111   Glucose   115 (A)  107 (A) 221 (A)      BUN   11  16 17      Creatinine   1.06  1.08 1.03      eGFR Non African Am   72  70       Sodium   138  139 138      Potassium   4.2  4.5 4.0      Chloride   104  104 102      Calcium   9.8  10.1 9.5      Albumin   4.60  4.50 4.70      Total Bilirubin   0.3  0.4 0.5      Alkaline Phosphatase   66  70 65      AST (SGOT)   34  39 36      ALT (SGPT)   29  33 31      WBC    7.05        Hemoglobin    15.4        Hematocrit    44.5        Platelets    191        Total Cholesterol  119     111     Triglycerides  244 (A)     144     HDL Cholesterol  32 (A)     31 (A)     LDL Cholesterol   48     55     Hemoglobin A1C 5.65 (A)       6.20 (A)    Microalbumin, Urine         4.8   (A) Abnormal value                 Procedures     ASSESSMENT AND PLAN:  Diagnoses and all orders for this visit:    1. Dizziness (Primary)  Assessment & Plan:  -It is very likely that his dizziness is caused by hypoglycemia or elevated blood pressure  -His dizziness is alleviated whenever he eats  -He was given a prescription for blood glucose monitoring he will check his blood sugar when the dizziness occurs  -His point-of-care blood glucose was 137, the last time he had something to eat was at 2 AM, he ate a donut  -He has not had any dizzy spells this morning    Orders:  -     POCT Glucose    2. Type 2 diabetes mellitus without complication, without long-term current use of insulin (HCC)  -     glucose blood (Accu-Chek Compact Plus) test strip; Use as instructed  Dispense: 25 each; Refill: 12  -     Lancets (accu-chek multiclix) lancets; Check BG when dizzy spells occur, routinely check BG weekly  Dispense: 102 each; Refill: 3    3. Essential  hypertension  Assessment & Plan:  -Since the patient's blood pressure has been elevated at home and is elevated in office today, along with symptoms of dizziness and HA, we will increase amlodipine to 10 mg daily  -The patient was instructed to take BP at home at daily.The patient will keep log of BP's and bring to next visit for review. Lifestyle changes were discussed to help lower BP including lowering salt intake, increasing exercise, and weight loss.  -If blood pressure consistently above 160 systolic he was instructed to seek ER care    Orders:  -     amLODIPine (NORVASC) 10 MG tablet; Take 1 tablet by mouth Daily.  Dispense: 90 tablet; Refill: 1          Follow Up   Return in about 2 weeks (around 4/12/2022), or if symptoms worsen or fail to improve, to re-eval dizziness and BP. Patient to notify office with any acute concerns or issues.  Patient verbalizes understanding, agrees with plan of care and has no further questions upon discharge.     Patient was given instructions and counseling regarding his condition or for health maintenance advice. Please see specific information pulled into the AVS if appropriate.

## 2022-03-30 DIAGNOSIS — E11.9 TYPE 2 DIABETES MELLITUS WITHOUT COMPLICATION, WITHOUT LONG-TERM CURRENT USE OF INSULIN: ICD-10-CM

## 2022-04-08 RX ORDER — METOPROLOL SUCCINATE 100 MG/1
TABLET, EXTENDED RELEASE ORAL
Qty: 90 TABLET | Refills: 0 | Status: SHIPPED | OUTPATIENT
Start: 2022-04-08 | End: 2022-09-23 | Stop reason: SDUPTHER

## 2022-04-12 ENCOUNTER — OFFICE VISIT (OUTPATIENT)
Dept: FAMILY MEDICINE CLINIC | Age: 57
End: 2022-04-12

## 2022-04-12 VITALS
TEMPERATURE: 99.1 F | HEIGHT: 71 IN | HEART RATE: 71 BPM | BODY MASS INDEX: 40.07 KG/M2 | WEIGHT: 286.2 LBS | DIASTOLIC BLOOD PRESSURE: 81 MMHG | SYSTOLIC BLOOD PRESSURE: 128 MMHG

## 2022-04-12 DIAGNOSIS — R42 DIZZINESS: Primary | ICD-10-CM

## 2022-04-12 DIAGNOSIS — I10 ESSENTIAL HYPERTENSION: ICD-10-CM

## 2022-04-12 PROCEDURE — 99213 OFFICE O/P EST LOW 20 MIN: CPT | Performed by: NURSE PRACTITIONER

## 2022-04-12 NOTE — PROGRESS NOTES
"Freedom Stevens presents to [facility] with complaint of  Follow-up (2 week follow up for dizziness. )    SUBJECTIVE  History of Present Illness     The patient is following up today as he was recently seen 2 weeks ago in office for complaints of dizziness.  Since his last visit, he denies any spells of dizziness or lightheadedness.  His amlodipine was increased to 10 mg to be taken daily as concern for increased BP for cause of dizziness.  The patient has not been checking his BP at home, but he says the dizziness has stopped.   He was also prescribed a blood glucose monitor but was not able to pick this up as the pharmacy was apparently out of these.   Noted that the patient's blood pressure is 128/81, much improved from his last visit where he was 145/74.     He denies having N/V/D, fever, CP, SOA, or syncopal events.      OBJECTIVE  Vital Signs:   /81 (BP Location: Left arm, Patient Position: Sitting)   Pulse 71   Temp 99.1 °F (37.3 °C) (Oral)   Ht 180.3 cm (70.98\")   Wt 130 kg (286 lb 3.2 oz)   BMI 39.93 kg/m²       Physical Exam  Constitutional:       General: He is not in acute distress.     Appearance: Normal appearance. He is not ill-appearing.   HENT:      Head: Normocephalic and atraumatic.      Nose: Nose normal.      Mouth/Throat:      Mouth: Mucous membranes are moist.   Cardiovascular:      Rate and Rhythm: Normal rate and regular rhythm.      Pulses: Normal pulses.      Heart sounds: Normal heart sounds.      Comments: +for clicking   Pulmonary:      Effort: Pulmonary effort is normal. No respiratory distress.      Breath sounds: Normal breath sounds.   Chest:      Chest wall: No tenderness.   Musculoskeletal:         General: Normal range of motion.      Cervical back: Normal range of motion and neck supple.   Skin:     General: Skin is warm and dry.      Capillary Refill: Capillary refill takes less than 2 seconds.   Neurological:      General: No focal " deficit present.      Mental Status: He is alert and oriented to person, place, and time. Mental status is at baseline.   Psychiatric:         Mood and Affect: Mood normal.         Behavior: Behavior normal.          Results Review:  The following data was reviewed by Dayana Judd, SHAZIA [unfilled] 08:37 EDT.    Common labs    Common Labsle 10/11/21 10/11/21 10/11/21 12/7/21 12/7/21 3/23/22 3/23/22 3/23/22 3/23/22    0859 0859 0859 1701 1701 1111 1111 1111 1111   Glucose   115 (A)  107 (A) 221 (A)      BUN   11  16 17      Creatinine   1.06  1.08 1.03      eGFR Non African Am   72  70       Sodium   138  139 138      Potassium   4.2  4.5 4.0      Chloride   104  104 102      Calcium   9.8  10.1 9.5      Albumin   4.60  4.50 4.70      Total Bilirubin   0.3  0.4 0.5      Alkaline Phosphatase   66  70 65      AST (SGOT)   34  39 36      ALT (SGPT)   29  33 31      WBC    7.05        Hemoglobin    15.4        Hematocrit    44.5        Platelets    191        Total Cholesterol  119     111     Triglycerides  244 (A)     144     HDL Cholesterol  32 (A)     31 (A)     LDL Cholesterol   48     55     Hemoglobin A1C 5.65 (A)       6.20 (A)    Microalbumin, Urine         4.8   (A) Abnormal value                 Procedures     ASSESSMENT AND PLAN:  Diagnoses and all orders for this visit:    1. Dizziness (Primary)  Assessment & Plan:  -His dizziness has resolved   -Likely cause was elevated blood pressure       2. Essential hypertension  Assessment & Plan:  -The patient's blood pressure is much improved, although I am not able to see what his blood pressures have been running rotuinely as he did not check his blood pressure at home/did not bring list for review  -However since his dizziness has not occurred since medication adjustment the likely cause of his dizziness with elevated blood pressure  -Patient understands he will continue to take amlodipine 10 mg daily, losartan 50 mg twice a day, and metoprolol 100 mg once a day for  his BP  -I encouraged the patient to check his blood pressure at home every now and then to ensure it is normal and that he is tolerating his BP meds         Follow Up   Return for Next scheduled follow up. Patient to notify office with any acute concerns or issues.  Patient verbalizes understanding, agrees with plan of care and has no further questions upon discharge.  The patient understands that if the dizziness reoccurs he will make an appointment to be seen. The patient was instructed to dial 911/seek ER care if he develops SOA, CP, uncontrolled fever N/VD or any other untoward symptoms.     Patient was given instructions and counseling regarding his condition or for health maintenance advice. Please see specific information pulled into the AVS if appropriate.

## 2022-04-12 NOTE — ASSESSMENT & PLAN NOTE
-The patient's blood pressure is much improved, although I am not able to see what his blood pressures have been running rotuinely as he did not check his blood pressure at home/did not bring list for review  -However since his dizziness has not occurred since medication adjustment the likely cause of his dizziness with elevated blood pressure  -Patient understands he will continue to take amlodipine 10 mg daily, losartan 50 mg twice a day, and metoprolol 100 mg once a day for his BP  -I encouraged the patient to check his blood pressure at home every now and then to ensure it is normal and that he is tolerating his BP meds

## 2022-04-15 RX ORDER — WARFARIN SODIUM 1 MG/1
TABLET ORAL
Qty: 90 TABLET | Refills: 1 | Status: SHIPPED | OUTPATIENT
Start: 2022-04-15 | End: 2022-10-17 | Stop reason: SDUPTHER

## 2022-04-26 ENCOUNTER — ANTICOAGULATION VISIT (OUTPATIENT)
Dept: FAMILY MEDICINE CLINIC | Age: 57
End: 2022-04-26

## 2022-04-26 VITALS — HEART RATE: 70 BPM | SYSTOLIC BLOOD PRESSURE: 126 MMHG | DIASTOLIC BLOOD PRESSURE: 73 MMHG

## 2022-04-26 DIAGNOSIS — Z95.2 HX OF AORTIC VALVE REPLACEMENT, MECHANICAL: Primary | ICD-10-CM

## 2022-04-26 LAB — INR PPP: 3.1 (ref 2.5–3.5)

## 2022-04-26 PROCEDURE — 36416 COLLJ CAPILLARY BLOOD SPEC: CPT | Performed by: FAMILY MEDICINE

## 2022-04-26 PROCEDURE — 93793 ANTICOAG MGMT PT WARFARIN: CPT | Performed by: FAMILY MEDICINE

## 2022-04-26 PROCEDURE — 85610 PROTHROMBIN TIME: CPT | Performed by: FAMILY MEDICINE

## 2022-05-10 RX ORDER — WARFARIN SODIUM 5 MG/1
TABLET ORAL
Qty: 90 TABLET | Refills: 1 | Status: SHIPPED | OUTPATIENT
Start: 2022-05-10 | End: 2022-11-15

## 2022-06-07 ENCOUNTER — TELEPHONE (OUTPATIENT)
Dept: FAMILY MEDICINE CLINIC | Age: 57
End: 2022-06-07

## 2022-06-08 ENCOUNTER — ANTICOAGULATION VISIT (OUTPATIENT)
Dept: FAMILY MEDICINE CLINIC | Age: 57
End: 2022-06-08

## 2022-06-08 VITALS — DIASTOLIC BLOOD PRESSURE: 79 MMHG | HEART RATE: 61 BPM | SYSTOLIC BLOOD PRESSURE: 127 MMHG

## 2022-06-08 DIAGNOSIS — Z95.2 HX OF AORTIC VALVE REPLACEMENT, MECHANICAL: Primary | ICD-10-CM

## 2022-06-08 LAB — INR PPP: 2.6 (ref 2.5–3.5)

## 2022-06-08 PROCEDURE — 36416 COLLJ CAPILLARY BLOOD SPEC: CPT | Performed by: FAMILY MEDICINE

## 2022-06-08 PROCEDURE — 85610 PROTHROMBIN TIME: CPT | Performed by: FAMILY MEDICINE

## 2022-06-08 PROCEDURE — 93793 ANTICOAG MGMT PT WARFARIN: CPT | Performed by: FAMILY MEDICINE

## 2022-07-12 ENCOUNTER — ANTICOAGULATION VISIT (OUTPATIENT)
Dept: FAMILY MEDICINE CLINIC | Age: 57
End: 2022-07-12

## 2022-07-12 VITALS — DIASTOLIC BLOOD PRESSURE: 76 MMHG | HEART RATE: 58 BPM | SYSTOLIC BLOOD PRESSURE: 129 MMHG

## 2022-07-12 DIAGNOSIS — Z95.2 HX OF AORTIC VALVE REPLACEMENT, MECHANICAL: Primary | ICD-10-CM

## 2022-07-12 LAB — INR PPP: 3.5 (ref 2.5–3.5)

## 2022-07-12 PROCEDURE — 93793 ANTICOAG MGMT PT WARFARIN: CPT | Performed by: FAMILY MEDICINE

## 2022-07-12 PROCEDURE — 85610 PROTHROMBIN TIME: CPT | Performed by: FAMILY MEDICINE

## 2022-07-12 PROCEDURE — 36416 COLLJ CAPILLARY BLOOD SPEC: CPT | Performed by: FAMILY MEDICINE

## 2022-07-15 RX ORDER — PRAVASTATIN SODIUM 20 MG
TABLET ORAL
Qty: 90 TABLET | Refills: 0 | Status: SHIPPED | OUTPATIENT
Start: 2022-07-15 | End: 2022-09-23 | Stop reason: SDUPTHER

## 2022-07-26 ENCOUNTER — ANTICOAGULATION VISIT (OUTPATIENT)
Dept: FAMILY MEDICINE CLINIC | Age: 57
End: 2022-07-26

## 2022-07-26 VITALS — HEART RATE: 65 BPM | SYSTOLIC BLOOD PRESSURE: 124 MMHG | DIASTOLIC BLOOD PRESSURE: 78 MMHG

## 2022-07-26 DIAGNOSIS — Z95.2 HX OF AORTIC VALVE REPLACEMENT, MECHANICAL: Primary | ICD-10-CM

## 2022-07-26 LAB — INR PPP: 4 (ref 2.5–3.5)

## 2022-07-26 PROCEDURE — 36416 COLLJ CAPILLARY BLOOD SPEC: CPT | Performed by: FAMILY MEDICINE

## 2022-07-26 PROCEDURE — 93793 ANTICOAG MGMT PT WARFARIN: CPT | Performed by: FAMILY MEDICINE

## 2022-07-26 PROCEDURE — 85610 PROTHROMBIN TIME: CPT | Performed by: FAMILY MEDICINE

## 2022-07-29 ENCOUNTER — ANTICOAGULATION VISIT (OUTPATIENT)
Dept: FAMILY MEDICINE CLINIC | Age: 57
End: 2022-07-29

## 2022-07-29 VITALS — DIASTOLIC BLOOD PRESSURE: 86 MMHG | HEART RATE: 76 BPM | SYSTOLIC BLOOD PRESSURE: 132 MMHG

## 2022-07-29 DIAGNOSIS — Z95.2 HX OF AORTIC VALVE REPLACEMENT, MECHANICAL: Primary | ICD-10-CM

## 2022-07-29 LAB — INR PPP: 2.3 (ref 2.5–3.5)

## 2022-07-29 PROCEDURE — 93793 ANTICOAG MGMT PT WARFARIN: CPT | Performed by: FAMILY MEDICINE

## 2022-07-29 PROCEDURE — 85610 PROTHROMBIN TIME: CPT | Performed by: FAMILY MEDICINE

## 2022-07-29 PROCEDURE — 36416 COLLJ CAPILLARY BLOOD SPEC: CPT | Performed by: FAMILY MEDICINE

## 2022-08-05 ENCOUNTER — ANTICOAGULATION VISIT (OUTPATIENT)
Dept: FAMILY MEDICINE CLINIC | Age: 57
End: 2022-08-05

## 2022-08-05 VITALS — HEART RATE: 64 BPM | DIASTOLIC BLOOD PRESSURE: 88 MMHG | SYSTOLIC BLOOD PRESSURE: 127 MMHG

## 2022-08-05 DIAGNOSIS — Z95.2 HX OF AORTIC VALVE REPLACEMENT, MECHANICAL: Primary | ICD-10-CM

## 2022-08-05 LAB — INR PPP: 3 (ref 2.5–3.5)

## 2022-08-05 PROCEDURE — 85610 PROTHROMBIN TIME: CPT | Performed by: FAMILY MEDICINE

## 2022-08-05 PROCEDURE — 36416 COLLJ CAPILLARY BLOOD SPEC: CPT | Performed by: FAMILY MEDICINE

## 2022-08-05 PROCEDURE — 93793 ANTICOAG MGMT PT WARFARIN: CPT | Performed by: FAMILY MEDICINE

## 2022-08-19 ENCOUNTER — ANTICOAGULATION VISIT (OUTPATIENT)
Dept: FAMILY MEDICINE CLINIC | Age: 57
End: 2022-08-19

## 2022-08-19 VITALS — SYSTOLIC BLOOD PRESSURE: 127 MMHG | HEART RATE: 58 BPM | DIASTOLIC BLOOD PRESSURE: 74 MMHG

## 2022-08-19 DIAGNOSIS — Z95.2 HX OF AORTIC VALVE REPLACEMENT, MECHANICAL: Primary | ICD-10-CM

## 2022-08-19 LAB — INR PPP: 3.7 (ref 2.5–3.5)

## 2022-08-19 PROCEDURE — 85610 PROTHROMBIN TIME: CPT | Performed by: FAMILY MEDICINE

## 2022-08-19 PROCEDURE — 93793 ANTICOAG MGMT PT WARFARIN: CPT | Performed by: FAMILY MEDICINE

## 2022-08-19 PROCEDURE — 36416 COLLJ CAPILLARY BLOOD SPEC: CPT | Performed by: FAMILY MEDICINE

## 2022-08-24 ENCOUNTER — ANTICOAGULATION VISIT (OUTPATIENT)
Dept: FAMILY MEDICINE CLINIC | Age: 57
End: 2022-08-24

## 2022-08-24 VITALS — HEART RATE: 61 BPM | SYSTOLIC BLOOD PRESSURE: 123 MMHG | DIASTOLIC BLOOD PRESSURE: 78 MMHG

## 2022-08-24 DIAGNOSIS — Z95.2 HX OF AORTIC VALVE REPLACEMENT, MECHANICAL: Primary | ICD-10-CM

## 2022-08-24 LAB — INR PPP: 1.8 (ref 2.5–3.5)

## 2022-08-24 PROCEDURE — 85610 PROTHROMBIN TIME: CPT | Performed by: FAMILY MEDICINE

## 2022-08-24 PROCEDURE — 36416 COLLJ CAPILLARY BLOOD SPEC: CPT | Performed by: FAMILY MEDICINE

## 2022-08-24 PROCEDURE — 93793 ANTICOAG MGMT PT WARFARIN: CPT | Performed by: FAMILY MEDICINE

## 2022-08-25 ENCOUNTER — TELEPHONE (OUTPATIENT)
Dept: UROLOGY | Facility: CLINIC | Age: 57
End: 2022-08-25

## 2022-08-26 ENCOUNTER — TELEPHONE (OUTPATIENT)
Dept: FAMILY MEDICINE CLINIC | Age: 57
End: 2022-08-26

## 2022-08-26 NOTE — TELEPHONE ENCOUNTER
Caller: Freedom Stevens    Relationship: Self    Best call back number: [phone]    What is the best time to reach you: ANY     Who are you requesting to speak with CLINICAL     What was the call regarding: PATIENT WOULD LIKE A CALL BACK TO SEE WHEN HE IS NEEDING TO GET INR COMPLETED     PLEASE ADVISE     Do you require a callback: YES

## 2022-08-30 ENCOUNTER — ANTICOAGULATION VISIT (OUTPATIENT)
Dept: FAMILY MEDICINE CLINIC | Age: 57
End: 2022-08-30

## 2022-08-30 VITALS — SYSTOLIC BLOOD PRESSURE: 132 MMHG | DIASTOLIC BLOOD PRESSURE: 80 MMHG | HEART RATE: 64 BPM

## 2022-08-30 DIAGNOSIS — Z95.2 HX OF AORTIC VALVE REPLACEMENT, MECHANICAL: Primary | ICD-10-CM

## 2022-08-30 LAB — INR PPP: 2.5 (ref 2.5–3.5)

## 2022-08-30 PROCEDURE — 93793 ANTICOAG MGMT PT WARFARIN: CPT | Performed by: FAMILY MEDICINE

## 2022-08-30 PROCEDURE — 85610 PROTHROMBIN TIME: CPT | Performed by: FAMILY MEDICINE

## 2022-08-30 PROCEDURE — 36416 COLLJ CAPILLARY BLOOD SPEC: CPT | Performed by: FAMILY MEDICINE

## 2022-08-31 ENCOUNTER — HOSPITAL ENCOUNTER (OUTPATIENT)
Dept: CT IMAGING | Facility: HOSPITAL | Age: 57
Discharge: HOME | End: 2022-08-31

## 2022-08-31 ENCOUNTER — LAB (OUTPATIENT)
Dept: LAB | Facility: HOSPITAL | Age: 57
End: 2022-08-31

## 2022-08-31 ENCOUNTER — TRANSCRIBE ORDERS (OUTPATIENT)
Dept: ADMINISTRATIVE | Facility: HOSPITAL | Age: 57
End: 2022-08-31

## 2022-08-31 DIAGNOSIS — Z79.899 ENCOUNTER FOR LONG-TERM (CURRENT) USE OF OTHER MEDICATIONS: ICD-10-CM

## 2022-08-31 DIAGNOSIS — F31.70 HISTORY OF DEPRESSED BIPOLAR DISORDER: ICD-10-CM

## 2022-08-31 DIAGNOSIS — Z79.899 ENCOUNTER FOR LONG-TERM (CURRENT) USE OF OTHER MEDICATIONS: Primary | ICD-10-CM

## 2022-08-31 DIAGNOSIS — Q61.9 CONGENITAL CYSTIC KIDNEY DISEASE: ICD-10-CM

## 2022-08-31 LAB
ALBUMIN SERPL-MCNC: 4.5 G/DL (ref 3.5–5.2)
ALBUMIN/GLOB SERPL: 1.7 G/DL
ALP SERPL-CCNC: 63 U/L (ref 39–117)
ALT SERPL W P-5'-P-CCNC: 35 U/L (ref 1–41)
ANION GAP SERPL CALCULATED.3IONS-SCNC: 7.2 MMOL/L (ref 5–15)
AST SERPL-CCNC: 38 U/L (ref 1–40)
BILIRUB SERPL-MCNC: 0.4 MG/DL (ref 0–1.2)
BUN SERPL-MCNC: 17 MG/DL (ref 6–20)
BUN/CREAT SERPL: 15.2 (ref 7–25)
CALCIUM SPEC-SCNC: 9.6 MG/DL (ref 8.6–10.5)
CHLORIDE SERPL-SCNC: 103 MMOL/L (ref 98–107)
CO2 SERPL-SCNC: 26.8 MMOL/L (ref 22–29)
CREAT BLDA-MCNC: 1.1 MG/DL
CREAT SERPL-MCNC: 1.12 MG/DL (ref 0.76–1.27)
EGFRCR SERPLBLD CKD-EPI 2021: 76.1 ML/MIN/1.73
EGFRCR SERPLBLD CKD-EPI 2021: 77.8 ML/MIN/1.73
GLOBULIN UR ELPH-MCNC: 2.6 GM/DL
GLUCOSE SERPL-MCNC: 118 MG/DL (ref 65–99)
LITHIUM SERPL-SCNC: 0.5 MMOL/L (ref 0.6–1.2)
POTASSIUM SERPL-SCNC: 4.6 MMOL/L (ref 3.5–5.2)
PROT SERPL-MCNC: 7.1 G/DL (ref 6–8.5)
SODIUM SERPL-SCNC: 137 MMOL/L (ref 136–145)
T3 SERPL-MCNC: 111 NG/DL (ref 80–200)
T4 SERPL-MCNC: 6.74 MCG/DL (ref 4.5–11.7)
TSH SERPL DL<=0.05 MIU/L-ACNC: 2.51 UIU/ML (ref 0.27–4.2)

## 2022-08-31 PROCEDURE — 84436 ASSAY OF TOTAL THYROXINE: CPT

## 2022-08-31 PROCEDURE — 74170 CT ABD WO CNTRST FLWD CNTRST: CPT

## 2022-08-31 PROCEDURE — 80178 ASSAY OF LITHIUM: CPT

## 2022-08-31 PROCEDURE — 82565 ASSAY OF CREATININE: CPT

## 2022-08-31 PROCEDURE — 84480 ASSAY TRIIODOTHYRONINE (T3): CPT

## 2022-08-31 PROCEDURE — 84443 ASSAY THYROID STIM HORMONE: CPT

## 2022-08-31 PROCEDURE — 36415 COLL VENOUS BLD VENIPUNCTURE: CPT

## 2022-08-31 PROCEDURE — 80053 COMPREHEN METABOLIC PANEL: CPT

## 2022-08-31 PROCEDURE — 0 IOPAMIDOL PER 1 ML: Performed by: UROLOGY

## 2022-08-31 RX ADMIN — IOPAMIDOL 100 ML: 755 INJECTION, SOLUTION INTRAVENOUS at 14:45

## 2022-09-13 ENCOUNTER — ANTICOAGULATION VISIT (OUTPATIENT)
Dept: FAMILY MEDICINE CLINIC | Age: 57
End: 2022-09-13

## 2022-09-13 ENCOUNTER — LAB (OUTPATIENT)
Dept: LAB | Facility: HOSPITAL | Age: 57
End: 2022-09-13

## 2022-09-13 ENCOUNTER — OFFICE VISIT (OUTPATIENT)
Dept: FAMILY MEDICINE CLINIC | Age: 57
End: 2022-09-13

## 2022-09-13 VITALS
HEART RATE: 68 BPM | SYSTOLIC BLOOD PRESSURE: 138 MMHG | WEIGHT: 283 LBS | HEIGHT: 71 IN | DIASTOLIC BLOOD PRESSURE: 85 MMHG | BODY MASS INDEX: 39.62 KG/M2

## 2022-09-13 VITALS — HEART RATE: 61 BPM | SYSTOLIC BLOOD PRESSURE: 141 MMHG | DIASTOLIC BLOOD PRESSURE: 81 MMHG

## 2022-09-13 DIAGNOSIS — E78.2 MIXED HYPERLIPIDEMIA: ICD-10-CM

## 2022-09-13 DIAGNOSIS — R60.0 LOCALIZED EDEMA: Primary | ICD-10-CM

## 2022-09-13 DIAGNOSIS — Z95.2 HX OF AORTIC VALVE REPLACEMENT, MECHANICAL: ICD-10-CM

## 2022-09-13 DIAGNOSIS — I71.21 ASCENDING AORTIC ANEURYSM: ICD-10-CM

## 2022-09-13 DIAGNOSIS — Z95.2 HX OF AORTIC VALVE REPLACEMENT, MECHANICAL: Primary | ICD-10-CM

## 2022-09-13 DIAGNOSIS — R60.0 LOCALIZED EDEMA: ICD-10-CM

## 2022-09-13 LAB
ALBUMIN SERPL-MCNC: 4.7 G/DL (ref 3.5–5.2)
ALBUMIN/GLOB SERPL: 1.8 G/DL
ALP SERPL-CCNC: 76 U/L (ref 39–117)
ALT SERPL W P-5'-P-CCNC: 38 U/L (ref 1–41)
ANION GAP SERPL CALCULATED.3IONS-SCNC: 12 MMOL/L (ref 5–15)
AST SERPL-CCNC: 38 U/L (ref 1–40)
BASOPHILS # BLD AUTO: 0.07 10*3/MM3 (ref 0–0.2)
BASOPHILS NFR BLD AUTO: 1.4 % (ref 0–1.5)
BILIRUB SERPL-MCNC: 0.4 MG/DL (ref 0–1.2)
BUN SERPL-MCNC: 14 MG/DL (ref 6–20)
BUN/CREAT SERPL: 13.9 (ref 7–25)
CALCIUM SPEC-SCNC: 10.2 MG/DL (ref 8.6–10.5)
CHLORIDE SERPL-SCNC: 103 MMOL/L (ref 98–107)
CHOLEST SERPL-MCNC: 121 MG/DL (ref 0–200)
CO2 SERPL-SCNC: 26 MMOL/L (ref 22–29)
CREAT SERPL-MCNC: 1.01 MG/DL (ref 0.76–1.27)
DEPRECATED RDW RBC AUTO: 44.2 FL (ref 37–54)
EGFRCR SERPLBLD CKD-EPI 2021: 86.2 ML/MIN/1.73
EOSINOPHIL # BLD AUTO: 0.38 10*3/MM3 (ref 0–0.4)
EOSINOPHIL NFR BLD AUTO: 7.7 % (ref 0.3–6.2)
ERYTHROCYTE [DISTWIDTH] IN BLOOD BY AUTOMATED COUNT: 13.2 % (ref 12.3–15.4)
GLOBULIN UR ELPH-MCNC: 2.6 GM/DL
GLUCOSE SERPL-MCNC: 127 MG/DL (ref 65–99)
HCT VFR BLD AUTO: 42.5 % (ref 37.5–51)
HDLC SERPL-MCNC: 35 MG/DL (ref 40–60)
HGB BLD-MCNC: 13.8 G/DL (ref 13–17.7)
IMM GRANULOCYTES # BLD AUTO: 0.02 10*3/MM3 (ref 0–0.05)
IMM GRANULOCYTES NFR BLD AUTO: 0.4 % (ref 0–0.5)
INR PPP: 3.7 (ref 2.5–3.5)
LDLC SERPL CALC-MCNC: 56 MG/DL (ref 0–100)
LDLC/HDLC SERPL: 1.42 {RATIO}
LYMPHOCYTES # BLD AUTO: 1.09 10*3/MM3 (ref 0.7–3.1)
LYMPHOCYTES NFR BLD AUTO: 22.2 % (ref 19.6–45.3)
MCH RBC QN AUTO: 29.1 PG (ref 26.6–33)
MCHC RBC AUTO-ENTMCNC: 32.5 G/DL (ref 31.5–35.7)
MCV RBC AUTO: 89.7 FL (ref 79–97)
MONOCYTES # BLD AUTO: 0.54 10*3/MM3 (ref 0.1–0.9)
MONOCYTES NFR BLD AUTO: 11 % (ref 5–12)
NEUTROPHILS NFR BLD AUTO: 2.81 10*3/MM3 (ref 1.7–7)
NEUTROPHILS NFR BLD AUTO: 57.3 % (ref 42.7–76)
NT-PROBNP SERPL-MCNC: 32.3 PG/ML (ref 0–900)
PLATELET # BLD AUTO: 172 10*3/MM3 (ref 140–450)
PMV BLD AUTO: 10.9 FL (ref 6–12)
POTASSIUM SERPL-SCNC: 4.4 MMOL/L (ref 3.5–5.2)
PROT SERPL-MCNC: 7.3 G/DL (ref 6–8.5)
RBC # BLD AUTO: 4.74 10*6/MM3 (ref 4.14–5.8)
SODIUM SERPL-SCNC: 141 MMOL/L (ref 136–145)
TRIGL SERPL-MCNC: 182 MG/DL (ref 0–150)
TSH SERPL DL<=0.05 MIU/L-ACNC: 2.52 UIU/ML (ref 0.27–4.2)
VLDLC SERPL-MCNC: 30 MG/DL (ref 5–40)
WBC NRBC COR # BLD: 4.91 10*3/MM3 (ref 3.4–10.8)

## 2022-09-13 PROCEDURE — 93793 ANTICOAG MGMT PT WARFARIN: CPT | Performed by: FAMILY MEDICINE

## 2022-09-13 PROCEDURE — 83880 ASSAY OF NATRIURETIC PEPTIDE: CPT

## 2022-09-13 PROCEDURE — 80061 LIPID PANEL: CPT

## 2022-09-13 PROCEDURE — 80050 GENERAL HEALTH PANEL: CPT

## 2022-09-13 PROCEDURE — 99214 OFFICE O/P EST MOD 30 MIN: CPT | Performed by: FAMILY MEDICINE

## 2022-09-13 PROCEDURE — 36416 COLLJ CAPILLARY BLOOD SPEC: CPT | Performed by: FAMILY MEDICINE

## 2022-09-13 PROCEDURE — 85610 PROTHROMBIN TIME: CPT | Performed by: FAMILY MEDICINE

## 2022-09-13 PROCEDURE — 36415 COLL VENOUS BLD VENIPUNCTURE: CPT

## 2022-09-13 NOTE — ASSESSMENT & PLAN NOTE
INR slightly elevated today at 3.7.  His goal range is 2.5-3.5 for his history of mechanical aortic valve replacement.  Hold warfarin tonight then resume his normal regimen tomorrow and we will recheck his INR in 1 week.

## 2022-09-13 NOTE — ASSESSMENT & PLAN NOTE
Pedal edema for the past 1 week.  It does not like he has been overindulging in salt but has already cut back on that.  We are checking labs today to include CBC, CMP, BNP and I will go ahead and check lipids since he will be due for that coming up.  Of note, he is on amlodipine, so we might consider decreasing or discontinuing that should the pedal edema persist.  However, given that it is of 1 week's duration, I think it is unlikely at this time that the amlodipine is primarily to blame.  I would recommend that he elevate the feet and continue to avoid salt.  May recommend compression stockings depending on the lab results.  We will contact him with these when they are available.

## 2022-09-13 NOTE — PROGRESS NOTES
"Chief Complaint  Foot Swelling (Bilateral X 1 week )    Subjective          Freedom Stevens presents to [facility] today for bilateral pedal edema for the past 1 week.  He has a history of ankle fracture in the R foot a couple of years and initially thought this might be the problem, since the R foot swelled up first.  However, the left foot was not far behind.  He has noticed stinging pain in the bilateral feet, \"like it's trying to split open on the top of my feet.\"  No redness or warmth.  Swelling goes up into the distal shin but no higher.  He has been eating a lot of salt lately.  He thought that might be the problem, so he cut back on the salt, but it did not seem to help the feet go back down.  No SOB.      He is on amlodipine for hypertension.  He is on warfarin for history of mechanical aortic valve replacement.      Current Outpatient Medications:   •  amLODIPine (NORVASC) 10 MG tablet, Take 1 tablet by mouth Daily., Disp: 90 tablet, Rfl: 1  •  lithium (ESKALITH) 450 MG CR tablet, Take 450 mg by mouth 2 (Two) Times a Day., Disp: , Rfl:   •  LORazepam (ATIVAN) 1 MG tablet, TAKE 1 TABLET BY MOUTH THREE TIMES DAILY AND 1 TABLET AS NEEDED MUST LAST 30 DAYS, Disp: , Rfl:   •  losartan (COZAAR) 25 MG tablet, Take 2 tablets by mouth 2 (Two) Times a Day., Disp: 180 tablet, Rfl: 1  •  metFORMIN (GLUCOPHAGE) 500 MG tablet, Take 1 tablet by mouth Daily., Disp: 90 tablet, Rfl: 1  •  metoprolol succinate XL (TOPROL-XL) 100 MG 24 hr tablet, Take 1 tablet by mouth once daily, Disp: 90 tablet, Rfl: 0  •  ondansetron ODT (Zofran ODT) 4 MG disintegrating tablet, Place 1 tablet on the tongue Every 8 (Eight) Hours As Needed for Nausea or Vomiting., Disp: 20 tablet, Rfl: 0  •  pramipexole (MIRAPEX) 0.5 MG tablet, Take 0.5 mg by mouth 2 (Two) Times a Day., Disp: , Rfl:   •  pravastatin (PRAVACHOL) 20 MG tablet, TAKE 1 TABLET BY MOUTH ONCE DAILY AT BEDTIME, Disp: 90 tablet, Rfl: 0  •  " "risperiDONE (risperDAL) 3 MG tablet, Take 3 mg by mouth every night at bedtime., Disp: , Rfl:   •  temazepam (RESTORIL) 30 MG capsule, Take 30 mg by mouth Daily., Disp: , Rfl:   •  warfarin (COUMADIN) 1 MG tablet, TAKE 1 TABLET BY MOUTH ONCE DAILY AT NIGHT, Disp: 90 tablet, Rfl: 1  •  warfarin (COUMADIN) 5 MG tablet, TAKE 1 TABLET BY MOUTH ONCE DAILY AT NIGHT, Disp: 90 tablet, Rfl: 1    Allergies:  Shellfish-derived products      Objective   Vital Signs:   Vitals:    09/13/22 0925 09/13/22 0959   BP: 141/81 138/85   BP Location: Left arm Left arm   Patient Position: Sitting Sitting   Pulse: 61 68   Weight: 128 kg (283 lb)    Height: 180.3 cm (70.98\")        Physical Exam  Vitals reviewed.   Constitutional:       General: He is not in acute distress.     Appearance: Normal appearance. He is well-developed.   HENT:      Head: Normocephalic and atraumatic.      Right Ear: External ear normal.      Left Ear: External ear normal.   Eyes:      Extraocular Movements: Extraocular movements intact.      Conjunctiva/sclera: Conjunctivae normal.      Pupils: Pupils are equal, round, and reactive to light.   Cardiovascular:      Rate and Rhythm: Normal rate and regular rhythm.      Pulses:           Dorsalis pedis pulses are 2+ on the right side and 2+ on the left side.      Heart sounds: No murmur heard.     Comments: +click  Pulmonary:      Effort: Pulmonary effort is normal.      Breath sounds: Normal breath sounds. No wheezing, rhonchi or rales.   Abdominal:      General: Bowel sounds are normal. There is no distension.      Palpations: Abdomen is soft.      Tenderness: There is no abdominal tenderness.   Musculoskeletal:         General: Normal range of motion.      Right lower leg: Edema (2+ to the proximal shin) present.      Left lower leg: Edema (2+ to the proximal shin) present.   Feet:      Right foot:      Skin integrity: No ulcer or blister.      Toenail Condition: Right toenails are normal.      Left foot:      " Skin integrity: No ulcer or blister.      Toenail Condition: Left toenails are normal.   Neurological:      Mental Status: He is alert.   Psychiatric:         Mood and Affect: Affect normal.          Lab Results   Component Value Date    GLUCOSE 118 (H) 08/31/2022    BUN 17 08/31/2022    CREATININE 1.10 08/31/2022    EGFRIFNONA 70 12/07/2021    EGFRIFAFRI 92 01/08/2018    BCR 15.2 08/31/2022    K 4.6 08/31/2022    CO2 26.8 08/31/2022    CALCIUM 9.6 08/31/2022    ALBUMIN 4.50 08/31/2022    LABIL2 1.5 03/11/2021    AST 38 08/31/2022    ALT 35 08/31/2022       Lab Results   Component Value Date    CHOL 111 03/23/2022    CHLPL 125 03/11/2021    TRIG 144 03/23/2022    HDL 31 (L) 03/23/2022    LDL 55 03/23/2022            Assessment and Plan    Diagnoses and all orders for this visit:    1. Localized edema (Primary)  Assessment & Plan:  Pedal edema for the past 1 week.  It does not like he has been overindulging in salt but has already cut back on that.  We are checking labs today to include CBC, CMP, BNP and I will go ahead and check lipids since he will be due for that coming up.  Of note, he is on amlodipine, so we might consider decreasing or discontinuing that should the pedal edema persist.  However, given that it is of 1 week's duration, I think it is unlikely at this time that the amlodipine is primarily to blame.  I would recommend that he elevate the feet and continue to avoid salt.  May recommend compression stockings depending on the lab results.  We will contact him with these when they are available.    Orders:  -     Comprehensive Metabolic Panel; Future  -     CBC & Differential; Future  -     TSH; Future    2. Mixed hyperlipidemia  Assessment & Plan:  Stable on pravastatin 20 mg daily.  No refills needed today.  Checking labs.    Orders:  -     Lipid Panel; Future    3. Ascending aortic aneurysm (HCC)  -     proBNP; Future    4. Hx of aortic valve replacement, mechanical  Assessment & Plan:  INR slightly  elevated today at 3.7.  His goal range is 2.5-3.5 for his history of mechanical aortic valve replacement.  Hold warfarin tonight then resume his normal regimen tomorrow and we will recheck his INR in 1 week.        Follow Up   Return if symptoms worsen or fail to improve, for or as scheduled 9/23/22.  Patient was given instructions and counseling regarding his condition or for health maintenance advice. Please see specific information pulled into the AVS if appropriate.

## 2022-09-20 ENCOUNTER — ANTICOAGULATION VISIT (OUTPATIENT)
Dept: FAMILY MEDICINE CLINIC | Age: 57
End: 2022-09-20

## 2022-09-20 VITALS — SYSTOLIC BLOOD PRESSURE: 137 MMHG | DIASTOLIC BLOOD PRESSURE: 78 MMHG | HEART RATE: 58 BPM

## 2022-09-20 DIAGNOSIS — Z95.2 HX OF AORTIC VALVE REPLACEMENT, MECHANICAL: Primary | ICD-10-CM

## 2022-09-20 LAB — INR PPP: 3.4 (ref 2.5–3.5)

## 2022-09-20 PROCEDURE — 85610 PROTHROMBIN TIME: CPT | Performed by: FAMILY MEDICINE

## 2022-09-20 PROCEDURE — 36416 COLLJ CAPILLARY BLOOD SPEC: CPT | Performed by: FAMILY MEDICINE

## 2022-09-20 PROCEDURE — 93793 ANTICOAG MGMT PT WARFARIN: CPT | Performed by: FAMILY MEDICINE

## 2022-09-23 ENCOUNTER — TELEPHONE (OUTPATIENT)
Dept: FAMILY MEDICINE CLINIC | Age: 57
End: 2022-09-23

## 2022-09-23 ENCOUNTER — OFFICE VISIT (OUTPATIENT)
Dept: FAMILY MEDICINE CLINIC | Age: 57
End: 2022-09-23

## 2022-09-23 VITALS
TEMPERATURE: 97.8 F | BODY MASS INDEX: 39.06 KG/M2 | WEIGHT: 279 LBS | HEART RATE: 61 BPM | SYSTOLIC BLOOD PRESSURE: 124 MMHG | HEIGHT: 71 IN | DIASTOLIC BLOOD PRESSURE: 72 MMHG

## 2022-09-23 DIAGNOSIS — F31.61 BIPOLAR DISORDER, CURRENT EPISODE MIXED, MILD: ICD-10-CM

## 2022-09-23 DIAGNOSIS — I10 ESSENTIAL HYPERTENSION: ICD-10-CM

## 2022-09-23 DIAGNOSIS — R60.0 LOCALIZED EDEMA: ICD-10-CM

## 2022-09-23 DIAGNOSIS — E78.2 MIXED HYPERLIPIDEMIA: ICD-10-CM

## 2022-09-23 DIAGNOSIS — Z23 NEED FOR VACCINATION: ICD-10-CM

## 2022-09-23 DIAGNOSIS — E11.9 TYPE 2 DIABETES MELLITUS WITHOUT COMPLICATION, WITHOUT LONG-TERM CURRENT USE OF INSULIN: ICD-10-CM

## 2022-09-23 DIAGNOSIS — Z23 ENCOUNTER FOR IMMUNIZATION: ICD-10-CM

## 2022-09-23 DIAGNOSIS — Z12.5 PROSTATE CANCER SCREENING: ICD-10-CM

## 2022-09-23 DIAGNOSIS — Z00.00 ANNUAL PHYSICAL EXAM: Primary | ICD-10-CM

## 2022-09-23 PROBLEM — H61.22 IMPACTED CERUMEN OF LEFT EAR: Status: RESOLVED | Noted: 2021-08-26 | Resolved: 2022-09-23

## 2022-09-23 PROCEDURE — 0124A PR ADM SARSCOV2 30MCG/0.3ML BST: CPT | Performed by: FAMILY MEDICINE

## 2022-09-23 PROCEDURE — 90471 IMMUNIZATION ADMIN: CPT | Performed by: FAMILY MEDICINE

## 2022-09-23 PROCEDURE — 91312 COVID-19 (PFIZER) BIVALENT BOOSTER 12+YRS: CPT | Performed by: FAMILY MEDICINE

## 2022-09-23 PROCEDURE — 99396 PREV VISIT EST AGE 40-64: CPT | Performed by: FAMILY MEDICINE

## 2022-09-23 PROCEDURE — 99214 OFFICE O/P EST MOD 30 MIN: CPT | Performed by: FAMILY MEDICINE

## 2022-09-23 PROCEDURE — 90686 IIV4 VACC NO PRSV 0.5 ML IM: CPT | Performed by: FAMILY MEDICINE

## 2022-09-23 RX ORDER — HYDROCHLOROTHIAZIDE 50 MG/1
50 TABLET ORAL DAILY
Qty: 30 TABLET | Refills: 5 | Status: ON HOLD | OUTPATIENT
Start: 2022-09-23 | End: 2023-01-22 | Stop reason: SDUPTHER

## 2022-09-23 RX ORDER — METOPROLOL SUCCINATE 100 MG/1
100 TABLET, EXTENDED RELEASE ORAL DAILY
Qty: 90 TABLET | Refills: 1 | Status: SHIPPED | OUTPATIENT
Start: 2022-09-23 | End: 2022-10-17 | Stop reason: SDUPTHER

## 2022-09-23 RX ORDER — LOSARTAN POTASSIUM 25 MG/1
50 TABLET ORAL 2 TIMES DAILY
Qty: 180 TABLET | Refills: 1 | Status: SHIPPED | OUTPATIENT
Start: 2022-09-23 | End: 2023-02-20

## 2022-09-23 RX ORDER — PRAVASTATIN SODIUM 20 MG
20 TABLET ORAL
Qty: 90 TABLET | Refills: 1 | Status: SHIPPED | OUTPATIENT
Start: 2022-09-23 | End: 2022-10-17 | Stop reason: SDUPTHER

## 2022-09-23 NOTE — ASSESSMENT & PLAN NOTE
We are going to discontinue the amlodipine to try to get resolution of his pedal edema.  I am going to replace it with hydrochlorothiazide at 50 mg daily.  Also continue the losartan and metoprolol succinate.  We will recheck labs in 2 weeks to reassess a BMP for kidney function and potassium levels.  I will also check some additional labs at that time.

## 2022-09-23 NOTE — TELEPHONE ENCOUNTER
Pharmacy Name:  [facility]    Pharmacy representative name: FALGUNI    Pharmacy representative phone number: [phone]    What medication are you calling in regards to: hydroCHLOROthiazide (HYDRODIURIL) 50 MG tablet    What question does the pharmacy have: PHARMACY IS SHOWING A INTERACTION WITH THE LITHIUM 450 MG TWICE A DAY (PRESCRIBED BY ANOTHER PROVIDER) - IN CAN INCREASE THE LITHIUM LEVELS -     PHARMACY WANTS TO CLARIFY THAT WE STILL WANT PATIENT ON hydroCHLOROthiazide (HYDRODIURIL) 50 MG tablet - OR DOES DOSAGE NEED TO BE CHANGED    PLEASE ADVISE    Who is the provider that prescribed the medication: Isabella Barber MD      Additional notes: DRUG INTERACTION

## 2022-09-23 NOTE — TELEPHONE ENCOUNTER
Keep all dosages currently for now, but I am checking lithium levels with his lab draw when we recheck in 2 weeks so that we can adjust levels if necessary.  Thanks for checking.  --BZM

## 2022-09-23 NOTE — ASSESSMENT & PLAN NOTE
He is UTD on colonoscopy, last done 12/2014 and this was normal.  Ten year repeat recommended.  He is due for prostate cancer screening; PSA ordered.  He is UTD on COVID (x3, due for bivalent booster - given today), Tdap (10/2018) and flu (10/2021).  Flu shot given today.  He is due for Shingrix.  Shingrix can be done at the pharmacy.  He is due for routine labs including A1c and PSA; ordered.

## 2022-09-23 NOTE — PROGRESS NOTES
Chief Complaint  Annual Exam    Subjective          Freedom Stevens presents to [facility] today for his annual physical.      He is UTD on colonoscopy, last done 12/2014 and this was normal.  Ten year repeat recommended.  He is due for prostate cancer screening.  He is UTD on COVID (x3, due for bivalent booster), Tdap (10/2018) and flu (10/2021).  He is due for Shingrix.  He is due for routine labs including A1c and PSA.    He was seen on 9/13/2022 for pedal edema.  Labs at that time came back looking okay.  The swelling has not improved since that time.  He has been elevating the legs and decreasing salt consumption.  Of note, he is on amlodipine.  He is currently on amlodipine, losartan and metoprolol succinate for hypertension. His blood pressure has been well controlled.  He denies chest pain, palpitations, or shortness of air.     He is on metformin for diabetes.  He is on a statin, ARB.  He is UTD on foot exam (3/2022).  He is UTD on eye exam, last done 12/2021.       He is on pravastatin for hyperlipidemia.  No problems with myalgias.      He is on warfarin for anticoagulation of a mechanical aortic valve. He follows with Dr. [facility].  We are managing the INR.      He is following with Dr. Mccauley for bipolar disorder with depressed mood and anxiety.  He is on a complex regimen of lithium, lorazepam, risperidone, pramipexole, zolpidem, and temazepam. His symptoms have been pretty well controlled.      He has DORIAN but has been unable to tolerate CPAP.    Review of Systems   Constitutional: Negative for chills, fatigue and fever.   HENT: Negative for congestion, hearing loss and rhinorrhea.    Eyes: Negative for pain and visual disturbance.   Respiratory: Negative for cough and shortness of breath.    Cardiovascular: Negative for chest pain and palpitations.   Gastrointestinal: Negative for abdominal pain, constipation, diarrhea, nausea and vomiting.    Genitourinary: Negative for dysuria and hematuria.   Musculoskeletal: Negative for arthralgias and myalgias.   Skin: Negative for rash.   Neurological: Negative for weakness and numbness.   Psychiatric/Behavioral: Negative for dysphoric mood and sleep disturbance. The patient is not nervous/anxious.          Current Outpatient Medications:   •  lithium (ESKALITH) 450 MG CR tablet, Take 450 mg by mouth 2 (Two) Times a Day., Disp: , Rfl:   •  LORazepam (ATIVAN) 1 MG tablet, TAKE 1 TABLET BY MOUTH THREE TIMES DAILY AND 1 TABLET AS NEEDED MUST LAST 30 DAYS, Disp: , Rfl:   •  losartan (COZAAR) 25 MG tablet, Take 2 tablets by mouth 2 (Two) Times a Day., Disp: 180 tablet, Rfl: 1  •  metFORMIN (GLUCOPHAGE) 500 MG tablet, Take 1 tablet by mouth Daily., Disp: 90 tablet, Rfl: 1  •  metoprolol succinate XL (TOPROL-XL) 100 MG 24 hr tablet, Take 1 tablet by mouth Daily., Disp: 90 tablet, Rfl: 1  •  ondansetron ODT (Zofran ODT) 4 MG disintegrating tablet, Place 1 tablet on the tongue Every 8 (Eight) Hours As Needed for Nausea or Vomiting., Disp: 20 tablet, Rfl: 0  •  pramipexole (MIRAPEX) 0.5 MG tablet, Take 0.5 mg by mouth 2 (Two) Times a Day., Disp: , Rfl:   •  pravastatin (PRAVACHOL) 20 MG tablet, Take 1 tablet by mouth every night at bedtime., Disp: 90 tablet, Rfl: 1  •  risperiDONE (risperDAL) 3 MG tablet, Take 3 mg by mouth every night at bedtime., Disp: , Rfl:   •  temazepam (RESTORIL) 30 MG capsule, Take 30 mg by mouth Daily., Disp: , Rfl:   •  warfarin (COUMADIN) 1 MG tablet, TAKE 1 TABLET BY MOUTH ONCE DAILY AT NIGHT, Disp: 90 tablet, Rfl: 1  •  warfarin (COUMADIN) 5 MG tablet, TAKE 1 TABLET BY MOUTH ONCE DAILY AT NIGHT, Disp: 90 tablet, Rfl: 1  •  hydroCHLOROthiazide (HYDRODIURIL) 50 MG tablet, Take 1 tablet by mouth Daily., Disp: 30 tablet, Rfl: 5    Allergies:  Shellfish-derived products      Objective   Vital Signs:   Vitals:    09/23/22 1136   BP: 124/72   BP Location: Left arm   Patient Position: Sitting   Pulse:  "61   Temp: 97.8 °F (36.6 °C)   TempSrc: Oral   Weight: 127 kg (279 lb)   Height: 180.3 cm (70.98\")     Physical Exam  Vitals reviewed.   Constitutional:       General: He is not in acute distress.     Appearance: Normal appearance. He is well-developed.   HENT:      Head: Normocephalic and atraumatic.      Right Ear: External ear normal.      Left Ear: External ear normal.      Mouth/Throat:      Mouth: Mucous membranes are moist.   Eyes:      Extraocular Movements: Extraocular movements intact.      Conjunctiva/sclera: Conjunctivae normal.      Pupils: Pupils are equal, round, and reactive to light.   Cardiovascular:      Rate and Rhythm: Normal rate and regular rhythm.      Heart sounds: No murmur heard.  Pulmonary:      Effort: Pulmonary effort is normal.      Breath sounds: Normal breath sounds. No wheezing, rhonchi or rales.   Abdominal:      General: Bowel sounds are normal. There is no distension.      Palpations: Abdomen is soft.      Tenderness: There is no abdominal tenderness.   Musculoskeletal:         General: Normal range of motion.      Right lower leg: Edema (1+) present.      Left lower leg: Edema (1+) present.   Skin:     General: Skin is warm and dry.   Neurological:      Mental Status: He is alert and oriented to person, place, and time.      Deep Tendon Reflexes: Reflexes normal.   Psychiatric:         Mood and Affect: Mood and affect normal.         Behavior: Behavior normal.         Thought Content: Thought content normal.         Judgment: Judgment normal.             Assessment and Plan    Diagnoses and all orders for this visit:    1. Annual physical exam (Primary)  Assessment & Plan:  He is UTD on colonoscopy, last done 12/2014 and this was normal.  Ten year repeat recommended.  He is due for prostate cancer screening; PSA ordered.  He is UTD on COVID (x3, due for bivalent booster - given today), Tdap (10/2018) and flu (10/2021).  Flu shot given today.  He is due for Shingrix.  Shingrix " can be done at the pharmacy.  He is due for routine labs including A1c and PSA; ordered.      2. Type 2 diabetes mellitus without complication, without long-term current use of insulin (HCC)  Assessment & Plan:  He is on metformin for diabetes.  He is on a statin, ARB.  He is UTD on foot exam (3/2022).  He is UTD on eye exam, last done 12/2021.      Orders:  -     Hemoglobin A1c; Future    3. Essential hypertension  Assessment & Plan:  We are going to discontinue the amlodipine to try to get resolution of his pedal edema.  I am going to replace it with hydrochlorothiazide at 50 mg daily.  Also continue the losartan and metoprolol succinate.  We will recheck labs in 2 weeks to reassess a BMP for kidney function and potassium levels.  I will also check some additional labs at that time.    Orders:  -     hydroCHLOROthiazide (HYDRODIURIL) 50 MG tablet; Take 1 tablet by mouth Daily.  Dispense: 30 tablet; Refill: 5  -     Basic Metabolic Panel; Future  -     losartan (COZAAR) 25 MG tablet; Take 2 tablets by mouth 2 (Two) Times a Day.  Dispense: 180 tablet; Refill: 1  -     metoprolol succinate XL (TOPROL-XL) 100 MG 24 hr tablet; Take 1 tablet by mouth Daily.  Dispense: 90 tablet; Refill: 1    4. Mixed hyperlipidemia  -     pravastatin (PRAVACHOL) 20 MG tablet; Take 1 tablet by mouth every night at bedtime.  Dispense: 90 tablet; Refill: 1    5. Prostate cancer screening  -     PSA Screen; Future    6. Bipolar disorder, current episode mixed, mild (HCC)  Assessment & Plan:  Follows with Dr. Mccauley.    Orders:  -     Lithium level; Future    7. Encounter for immunization  -     FluLaval/Fluarix/Fluzone >6 Months    8. Need for vaccination  -     COVID-19 Bivalent Booster (Pfizer) 12+yrs    9. Localized edema  Assessment & Plan:  As per hypertension plan.        Follow Up   Return in about 3 months (around 12/23/2022) for Recheck.  Patient was given instructions and counseling regarding his condition or for health  maintenance advice. Please see specific information pulled into the AVS if appropriate.

## 2022-09-23 NOTE — ASSESSMENT & PLAN NOTE
He is on metformin for diabetes.  He is on a statin, ARB.  He is UTD on foot exam (3/2022).  He is UTD on eye exam, last done 12/2021.

## 2022-10-04 ENCOUNTER — ANTICOAGULATION VISIT (OUTPATIENT)
Dept: FAMILY MEDICINE CLINIC | Age: 57
End: 2022-10-04

## 2022-10-04 VITALS — SYSTOLIC BLOOD PRESSURE: 119 MMHG | DIASTOLIC BLOOD PRESSURE: 76 MMHG | HEART RATE: 57 BPM

## 2022-10-04 DIAGNOSIS — Z95.2 HX OF AORTIC VALVE REPLACEMENT, MECHANICAL: Primary | ICD-10-CM

## 2022-10-04 LAB — INR PPP: 3.4 (ref 2.5–3.5)

## 2022-10-04 PROCEDURE — 85610 PROTHROMBIN TIME: CPT | Performed by: FAMILY MEDICINE

## 2022-10-04 PROCEDURE — 93793 ANTICOAG MGMT PT WARFARIN: CPT | Performed by: FAMILY MEDICINE

## 2022-10-04 PROCEDURE — 36416 COLLJ CAPILLARY BLOOD SPEC: CPT | Performed by: FAMILY MEDICINE

## 2022-10-10 ENCOUNTER — OFFICE VISIT (OUTPATIENT)
Dept: UROLOGY | Facility: CLINIC | Age: 57
End: 2022-10-10

## 2022-10-10 VITALS — BODY MASS INDEX: 38.78 KG/M2 | HEIGHT: 71 IN | RESPIRATION RATE: 16 BRPM | WEIGHT: 277 LBS

## 2022-10-10 DIAGNOSIS — Q61.9 CONGENITAL CYSTIC KIDNEY DISEASE: Primary | ICD-10-CM

## 2022-10-10 DIAGNOSIS — N13.8 BPH WITH OBSTRUCTION/LOWER URINARY TRACT SYMPTOMS: ICD-10-CM

## 2022-10-10 DIAGNOSIS — N40.1 BPH WITH OBSTRUCTION/LOWER URINARY TRACT SYMPTOMS: ICD-10-CM

## 2022-10-10 PROBLEM — N20.0 KIDNEY STONE: Status: ACTIVE | Noted: 2022-10-10

## 2022-10-10 LAB
BILIRUB BLD-MCNC: NEGATIVE MG/DL
CLARITY, POC: CLEAR
COLOR UR: YELLOW
EXPIRATION DATE: ABNORMAL
GLUCOSE UR STRIP-MCNC: NEGATIVE MG/DL
KETONES UR QL: NEGATIVE
LEUKOCYTE EST, POC: NEGATIVE
Lab: ABNORMAL
NITRITE UR-MCNC: NEGATIVE MG/ML
PH UR: 6 [PH] (ref 5–8)
PROT UR STRIP-MCNC: NEGATIVE MG/DL
RBC # UR STRIP: ABNORMAL /UL
SP GR UR: 1.01 (ref 1–1.03)
UROBILINOGEN UR QL: ABNORMAL

## 2022-10-10 PROCEDURE — 99214 OFFICE O/P EST MOD 30 MIN: CPT | Performed by: UROLOGY

## 2022-10-10 PROCEDURE — 81003 URINALYSIS AUTO W/O SCOPE: CPT | Performed by: UROLOGY

## 2022-10-10 RX ORDER — TAMSULOSIN HYDROCHLORIDE 0.4 MG/1
1 CAPSULE ORAL DAILY
Qty: 90 CAPSULE | Refills: 3 | Status: SHIPPED | OUTPATIENT
Start: 2022-10-10 | End: 2023-10-05

## 2022-10-10 NOTE — PROGRESS NOTES
"Chief Complaint  annual follow up with CT results    Subjective     {Problem List  Visit Diagnosis   Encounters  Notes  Medications  Labs  Result Review Imaging  Media :23}     Freedom Stevens presents to [facility]     History of Present Illness  Patient is here for follow-up of polycystic kidney disease.  His creatinine has been normal and is checked every 6 months.     He recently had a repeat CT scan to follow-up on his polycystic kidney disease.  CT scan findings are below.    The patient has polycystic kidney disease and is here for a follow-up. Recent CT scan reveals no evidence of enhancing renal masses. He is accompanied by an adult female who contributes to his care.    He reports he urinates 3 to 4 times per night. He reports he sees a nephrologist.    Objective   Vital Signs:   Resp 16   Ht 180.3 cm (70.98\")   Wt 126 kg (277 lb)   BMI 38.66 kg/m²       Physical Exam  Vitals and nursing note reviewed.   Constitutional:       Appearance: Normal appearance. He is well-developed.   Pulmonary:      Effort: Pulmonary effort is normal.      Breath sounds: Normal air entry.   Neurological:      Mental Status: He is alert and oriented to person, place, and time.      Motor: Motor function is intact.   Psychiatric:         Mood and Affect: Mood normal.         Behavior: Behavior normal.          Result Review :       Results for orders placed or performed in visit on 10/10/22   POC Urinalysis Dipstick, Automated    Specimen: Urine   Result Value Ref Range    Color Yellow Yellow, Straw, Dark Yellow, Holli    Clarity, UA Clear Clear    Specific Gravity  1.015 1.005 - 1.030    pH, Urine 6.0 5.0 - 8.0    Leukocytes Negative Negative    Nitrite, UA Negative Negative    Protein, POC Negative Negative mg/dL    Glucose, UA Negative Negative mg/dL    Ketones, UA Negative Negative    Urobilinogen, UA 0.2 E.U./dL Normal, 0.2 E.U./dL    Bilirubin Negative Negative    Blood, UA Small (A) " Negative    Lot Number [id number]     Expiration Date 82,023        Data reviewed: Radiologic studies CT scan:     Study Result    Narrative & Impression   PROCEDURE:  CT ABDOMEN W WO CONTRAST     COMPARISON: [facility], CT, CT ABDOMEN PELVIS W WO CONTRAST, 9/01/2021,   13:34.     INDICATIONS:  Renal cyst     TECHNIQUE:    After obtaining the patient's consent, CT images were created without and with non-ionic   intravenous contrast material.       PROTOCOL:     Renal mass imaging protocol performed                 RADIATION:      DLP: 3318.7 mGy*cm               Automated exposure control was utilized to minimize radiation dose.   CONTRAST:      100 cc Isovue 370 I.V.  LABS:   eGFR: 69 ml/min/1.73m2     FINDINGS:          Lower chest:  Tiny nodules in the right lower lobe likely granulomas     Kidneys:  There is a small right upper pole renal calculus.  There are multiple small left renal   calculi, the largest measuring 5 mm in the upper pole.  There are innumerable bilateral renal   cysts, including subcentimeter hyperdense cysts arising from the upper poles of both kidneys.  The   largest cyst measures 5 cm, on the left kidney.  No definite enhancing lesion is demonstrated.    There are no suspicious filling defects of the visualized portion of either collecting system.     Extrarenal organs:  Liver, spleen, pancreas, adrenals unremarkable.  Gallbladder surgically absent     GI tract:  No abnormality of the abdominal portion of the gastrointestinal tract is demonstrated     Peritoneum/retroperitoneum:  No retroperitoneal adenopathy.  No abdominal ascites.  Normal caliber   aorta     Bones/soft tissues:  No suspicious bone lesion     IMPRESSION:                    1. Bilateral renal calculi      2. Innumerable bilateral renal cysts.  No definite enhancing renal mass demonstrated              DINORA GUTIERREZ MD         Electronically Signed and Approved By: DINORA GUTIERREZ MD on 9/01/2022 at 8:14                Assessment and Plan    Diagnoses and all orders for this visit:    1. Congenital cystic kidney disease (Primary)  -     POC Urinalysis Dipstick, Automated  -     CT Abdomen Pelvis With & Without Contrast; Future    2. BPH with obstruction/lower urinary tract symptoms  -     tamsulosin (FLOMAX) 0.4 MG capsule 24 hr capsule; Take 1 capsule by mouth Daily for 360 days.  Dispense: 90 capsule; Refill: 3    BPH  - Will start him on Flomax. He will let me know if that is not effective. Otherwise, we will follow up with that in a year.    Polycystic kidney disease  - Will get a CT scan in 1 year. I will see him then. Recent scan showed no evidence of enhancing cyst.        Follow Up       Return in about 1 year (around 10/10/2023) for CT scan prior.  Patient was given instructions and counseling regarding his condition or for health maintenance advice. Please see specific information pulled into the AVS if appropriate.     Transcribed from ambient dictation for Ella Agrawal MD by Maria R Cervantes.  10/10/22   16:42 EDT    Patient or patient representative verbalized consent to the visit recording.  I have personally performed the services described in this document as transcribed by the above individual, and it is both accurate and complete.  Ella Agrawal MD  10/11/2022  09:48 EDT

## 2022-10-12 ENCOUNTER — TELEPHONE (OUTPATIENT)
Dept: FAMILY MEDICINE CLINIC | Age: 57
End: 2022-10-12

## 2022-10-17 DIAGNOSIS — I10 ESSENTIAL HYPERTENSION: ICD-10-CM

## 2022-10-17 DIAGNOSIS — E78.2 MIXED HYPERLIPIDEMIA: ICD-10-CM

## 2022-10-17 NOTE — TELEPHONE ENCOUNTER
Caller: Freedom Stevens    Relationship: Self    Best call back number: [phone]    Requested Prescriptions:   Requested Prescriptions     Pending Prescriptions Disp Refills   • metoprolol succinate XL (TOPROL-XL) 100 MG 24 hr tablet 90 tablet 1     Sig: Take 1 tablet by mouth Daily.   • warfarin (COUMADIN) 1 MG tablet 90 tablet 1     Sig: Take 1 tablet by mouth Every Night.   • pravastatin (PRAVACHOL) 20 MG tablet 90 tablet 1     Sig: Take 1 tablet by mouth every night at bedtime.      AMLODIPINE     Pharmacy where request should be sent: [place] [id number] [address] [facility] [phone]  - [phone] FX     Additional details provided by patient:         Does the patient have less than a 3 day supply:  [x] Yes  [] No    Maxine Rivero Rep   10/17/22 12:26 EDT

## 2022-10-18 ENCOUNTER — ANTICOAGULATION VISIT (OUTPATIENT)
Dept: FAMILY MEDICINE CLINIC | Age: 57
End: 2022-10-18

## 2022-10-18 VITALS — HEART RATE: 63 BPM | SYSTOLIC BLOOD PRESSURE: 116 MMHG | DIASTOLIC BLOOD PRESSURE: 69 MMHG

## 2022-10-18 DIAGNOSIS — Z95.2 HX OF AORTIC VALVE REPLACEMENT, MECHANICAL: Primary | ICD-10-CM

## 2022-10-18 LAB — INR PPP: 2.6 (ref 2.5–3.5)

## 2022-10-18 PROCEDURE — 36416 COLLJ CAPILLARY BLOOD SPEC: CPT | Performed by: FAMILY MEDICINE

## 2022-10-18 PROCEDURE — 93793 ANTICOAG MGMT PT WARFARIN: CPT | Performed by: FAMILY MEDICINE

## 2022-10-18 PROCEDURE — 85610 PROTHROMBIN TIME: CPT | Performed by: FAMILY MEDICINE

## 2022-10-18 RX ORDER — METOPROLOL SUCCINATE 100 MG/1
100 TABLET, EXTENDED RELEASE ORAL DAILY
Qty: 90 TABLET | Refills: 1 | Status: SHIPPED | OUTPATIENT
Start: 2022-10-18 | End: 2023-01-22 | Stop reason: HOSPADM

## 2022-10-18 RX ORDER — PRAVASTATIN SODIUM 20 MG
20 TABLET ORAL
Qty: 90 TABLET | Refills: 1 | Status: SHIPPED | OUTPATIENT
Start: 2022-10-18 | End: 2023-04-03

## 2022-10-18 RX ORDER — WARFARIN SODIUM 1 MG/1
1 TABLET ORAL NIGHTLY
Qty: 90 TABLET | Refills: 1 | Status: SHIPPED | OUTPATIENT
Start: 2022-10-18 | End: 2023-04-03

## 2022-10-21 ENCOUNTER — LAB (OUTPATIENT)
Dept: LAB | Facility: HOSPITAL | Age: 57
End: 2022-10-21

## 2022-10-21 DIAGNOSIS — Z12.5 PROSTATE CANCER SCREENING: ICD-10-CM

## 2022-10-21 DIAGNOSIS — I10 ESSENTIAL HYPERTENSION: ICD-10-CM

## 2022-10-21 DIAGNOSIS — E11.9 TYPE 2 DIABETES MELLITUS WITHOUT COMPLICATION, WITHOUT LONG-TERM CURRENT USE OF INSULIN: ICD-10-CM

## 2022-10-21 DIAGNOSIS — F31.61 BIPOLAR DISORDER, CURRENT EPISODE MIXED, MILD: ICD-10-CM

## 2022-10-21 PROCEDURE — 80048 BASIC METABOLIC PNL TOTAL CA: CPT

## 2022-10-21 PROCEDURE — 80178 ASSAY OF LITHIUM: CPT

## 2022-10-21 PROCEDURE — G0103 PSA SCREENING: HCPCS

## 2022-10-21 PROCEDURE — 83036 HEMOGLOBIN GLYCOSYLATED A1C: CPT

## 2022-10-21 PROCEDURE — 36415 COLL VENOUS BLD VENIPUNCTURE: CPT

## 2022-10-22 LAB
ANION GAP SERPL CALCULATED.3IONS-SCNC: 10.6 MMOL/L (ref 5–15)
BUN SERPL-MCNC: 15 MG/DL (ref 6–20)
BUN/CREAT SERPL: 12.1 (ref 7–25)
CALCIUM SPEC-SCNC: 9.8 MG/DL (ref 8.6–10.5)
CHLORIDE SERPL-SCNC: 103 MMOL/L (ref 98–107)
CO2 SERPL-SCNC: 27.4 MMOL/L (ref 22–29)
CREAT SERPL-MCNC: 1.24 MG/DL (ref 0.76–1.27)
EGFRCR SERPLBLD CKD-EPI 2021: 67.4 ML/MIN/1.73
GLUCOSE SERPL-MCNC: 91 MG/DL (ref 65–99)
HBA1C MFR BLD: 6.2 % (ref 4.8–5.6)
LITHIUM SERPL-SCNC: 0.5 MMOL/L (ref 0.6–1.2)
POTASSIUM SERPL-SCNC: 3.9 MMOL/L (ref 3.5–5.2)
PSA SERPL-MCNC: 0.96 NG/ML (ref 0–4)
SODIUM SERPL-SCNC: 141 MMOL/L (ref 136–145)

## 2022-10-24 DIAGNOSIS — I10 ESSENTIAL HYPERTENSION: ICD-10-CM

## 2022-10-25 RX ORDER — AMLODIPINE BESYLATE 10 MG/1
TABLET ORAL
Qty: 90 TABLET | Refills: 1 | Status: SHIPPED | OUTPATIENT
Start: 2022-10-25

## 2022-11-15 ENCOUNTER — ANTICOAGULATION VISIT (OUTPATIENT)
Dept: FAMILY MEDICINE CLINIC | Age: 57
End: 2022-11-15

## 2022-11-15 VITALS — HEART RATE: 68 BPM | SYSTOLIC BLOOD PRESSURE: 125 MMHG | DIASTOLIC BLOOD PRESSURE: 77 MMHG

## 2022-11-15 DIAGNOSIS — Z95.2 HX OF AORTIC VALVE REPLACEMENT, MECHANICAL: Primary | ICD-10-CM

## 2022-11-15 LAB — INR PPP: 3.3 (ref 2.5–3.5)

## 2022-11-15 PROCEDURE — 36416 COLLJ CAPILLARY BLOOD SPEC: CPT | Performed by: FAMILY MEDICINE

## 2022-11-15 PROCEDURE — 93793 ANTICOAG MGMT PT WARFARIN: CPT | Performed by: FAMILY MEDICINE

## 2022-11-15 PROCEDURE — 85610 PROTHROMBIN TIME: CPT | Performed by: FAMILY MEDICINE

## 2022-11-15 RX ORDER — WARFARIN SODIUM 5 MG/1
TABLET ORAL
Qty: 90 TABLET | Refills: 1 | Status: SHIPPED | OUTPATIENT
Start: 2022-11-15 | End: 2023-04-03

## 2022-12-16 ENCOUNTER — ANTICOAGULATION VISIT (OUTPATIENT)
Dept: FAMILY MEDICINE CLINIC | Age: 57
End: 2022-12-16

## 2022-12-16 VITALS — HEART RATE: 70 BPM | SYSTOLIC BLOOD PRESSURE: 125 MMHG | DIASTOLIC BLOOD PRESSURE: 82 MMHG

## 2022-12-16 DIAGNOSIS — Z95.2 HX OF AORTIC VALVE REPLACEMENT, MECHANICAL: Primary | ICD-10-CM

## 2022-12-16 LAB — INR PPP: 2.4 (ref 2.5–3.5)

## 2022-12-16 PROCEDURE — 36416 COLLJ CAPILLARY BLOOD SPEC: CPT | Performed by: FAMILY MEDICINE

## 2022-12-16 PROCEDURE — 85610 PROTHROMBIN TIME: CPT | Performed by: FAMILY MEDICINE

## 2022-12-16 PROCEDURE — 93793 ANTICOAG MGMT PT WARFARIN: CPT | Performed by: FAMILY MEDICINE

## 2022-12-29 ENCOUNTER — OFFICE VISIT (OUTPATIENT)
Dept: FAMILY MEDICINE CLINIC | Age: 57
End: 2022-12-29

## 2022-12-29 VITALS
BODY MASS INDEX: 38.92 KG/M2 | HEIGHT: 71 IN | SYSTOLIC BLOOD PRESSURE: 126 MMHG | DIASTOLIC BLOOD PRESSURE: 84 MMHG | HEART RATE: 66 BPM | WEIGHT: 278 LBS

## 2022-12-29 DIAGNOSIS — F31.61 BIPOLAR DISORDER, CURRENT EPISODE MIXED, MILD: ICD-10-CM

## 2022-12-29 DIAGNOSIS — E78.2 MIXED HYPERLIPIDEMIA: ICD-10-CM

## 2022-12-29 DIAGNOSIS — I10 ESSENTIAL HYPERTENSION: ICD-10-CM

## 2022-12-29 DIAGNOSIS — E11.9 TYPE 2 DIABETES MELLITUS WITHOUT COMPLICATION, WITHOUT LONG-TERM CURRENT USE OF INSULIN: Primary | ICD-10-CM

## 2022-12-29 DIAGNOSIS — Z95.2 HX OF AORTIC VALVE REPLACEMENT, MECHANICAL: ICD-10-CM

## 2022-12-29 PROBLEM — R42 DIZZINESS: Status: RESOLVED | Noted: 2022-03-29 | Resolved: 2022-12-29

## 2022-12-29 PROBLEM — R60.0 LOCALIZED EDEMA: Status: RESOLVED | Noted: 2022-09-13 | Resolved: 2022-12-29

## 2022-12-29 PROBLEM — Z00.00 ANNUAL PHYSICAL EXAM: Status: RESOLVED | Noted: 2022-09-23 | Resolved: 2022-12-29

## 2022-12-29 LAB — INR PPP: 2.6 (ref 2.5–3.5)

## 2022-12-29 PROCEDURE — 85610 PROTHROMBIN TIME: CPT | Performed by: FAMILY MEDICINE

## 2022-12-29 PROCEDURE — 36416 COLLJ CAPILLARY BLOOD SPEC: CPT | Performed by: FAMILY MEDICINE

## 2022-12-29 PROCEDURE — 99214 OFFICE O/P EST MOD 30 MIN: CPT | Performed by: FAMILY MEDICINE

## 2022-12-29 NOTE — ASSESSMENT & PLAN NOTE
On warfarin for mechanical heart valve.  Okay for him to go ahead and get his INR today so he does not have to come back next week.

## 2022-12-29 NOTE — ASSESSMENT & PLAN NOTE
Blood pressure at goal.  He is doing much better since the addition of the HCTZ.  He did continue the amlodipine after all but the pedal edema seems to have resolved and his blood pressure is certainly beautifully controlled today in clinic.  I think we will just plan to continue him on all 4 agents since that seems to be working for him.  Continue amlodipine 10 mg daily, HCTZ 50 mg daily, losartan 50 mg twice daily and metoprolol succinate 100 mg daily.  No refills needed today.  Continue to monitor closely.  Labs are reviewed and up-to-date.

## 2022-12-29 NOTE — ASSESSMENT & PLAN NOTE
He is on metformin for diabetes.  Refills sent.  He is tolerating it well.  Last A1c was 6.2 at the end of October.  He is on a statin, ARB.  He is UTD on foot exam (3/2022).  He is due for eye exam, last done 12/2021.  He is scheduled to go to [facility] next week for that.

## 2022-12-29 NOTE — PROGRESS NOTES
Chief Complaint  Diabetes (3 month follow up)    Subjective          Freedom Stevens presents to [facility] today for f/u of routine issues.    He got sick with something at the end of Christmas.  He did a home test for COVID that came back negative.  He is mostly feeling better but still fatigued and a bit shaky.  He had cough but that's better now.  No fevers.      He is on metformin for diabetes.  Last A1c was 6.2 at the end of October.  He is on a statin, ARB.  He is UTD on foot exam (3/2022).  He is due for eye exam, last done 12/2021.  He is scheduled to go to [facility] next week for that.    He is on amlodipine, HCTZ, losartan and metoprolol succinate for HTN.  He was having some trouble with pedal edema.  At his last visit, we added HCTZ and had discussed stopping amlodipine, but he is currently taking both.  BP looks great.  Pedal edema is mostly resolved.  No CP, palpitations, SOB.     He is on pravastatin for HLD.  No myalgias.      He is on warfarin for anticoagulation of a mechanical aortic valve.  Following with Dr. Tellez [facility]iology.  We are managing the INR.      He is following with Dr. Mccauley for bipolar disorder with depressed mood and anxiety.  He is on a complex regimen of lithium, lorazepam, risperidone, pramipexole, and temazepam. His symptoms have been pretty well controlled.      He has DORIAN but has been unable to tolerate CPAP.      Current Outpatient Medications:   •  amLODIPine (NORVASC) 10 MG tablet, Take 1 tablet by mouth once daily, Disp: 90 tablet, Rfl: 1  •  hydroCHLOROthiazide (HYDRODIURIL) 50 MG tablet, Take 1 tablet by mouth Daily., Disp: 30 tablet, Rfl: 5  •  lithium (ESKALITH) 450 MG CR tablet, Take 450 mg by mouth 2 (Two) Times a Day., Disp: , Rfl:   •  LORazepam (ATIVAN) 1 MG tablet, TAKE 1 TABLET BY MOUTH THREE TIMES DAILY AND 1 TABLET AS NEEDED MUST LAST 30 DAYS, Disp: , Rfl:   •  losartan (COZAAR) 25 MG tablet, Take 2  "tablets by mouth 2 (Two) Times a Day., Disp: 180 tablet, Rfl: 1  •  metFORMIN (GLUCOPHAGE) 500 MG tablet, Take 1 tablet by mouth Daily., Disp: 90 tablet, Rfl: 1  •  metoprolol succinate XL (TOPROL-XL) 100 MG 24 hr tablet, Take 1 tablet by mouth Daily., Disp: 90 tablet, Rfl: 1  •  ondansetron ODT (Zofran ODT) 4 MG disintegrating tablet, Place 1 tablet on the tongue Every 8 (Eight) Hours As Needed for Nausea or Vomiting., Disp: 20 tablet, Rfl: 0  •  pramipexole (MIRAPEX) 0.5 MG tablet, Take 0.5 mg by mouth 2 (Two) Times a Day., Disp: , Rfl:   •  pravastatin (PRAVACHOL) 20 MG tablet, Take 1 tablet by mouth every night at bedtime., Disp: 90 tablet, Rfl: 1  •  risperiDONE (risperDAL) 3 MG tablet, Take 3 mg by mouth every night at bedtime., Disp: , Rfl:   •  tamsulosin (FLOMAX) 0.4 MG capsule 24 hr capsule, Take 1 capsule by mouth Daily for 360 days., Disp: 90 capsule, Rfl: 3  •  temazepam (RESTORIL) 30 MG capsule, Take 30 mg by mouth Daily., Disp: , Rfl:   •  warfarin (COUMADIN) 1 MG tablet, Take 1 tablet by mouth Every Night., Disp: 90 tablet, Rfl: 1  •  warfarin (COUMADIN) 5 MG tablet, TAKE 1 TABLET BY MOUTH ONCE DAILY AT NIGHT, Disp: 90 tablet, Rfl: 1    Allergies:  Shellfish-derived products      Objective   Vital Signs:   Vitals:    12/29/22 0923   BP: 126/84   BP Location: Right arm   Patient Position: Sitting   Pulse: 66   Weight: 126 kg (278 lb)   Height: 180.3 cm (70.98\")       Physical Exam  Vitals reviewed.   Constitutional:       General: He is not in acute distress.     Appearance: Normal appearance. He is well-developed.   HENT:      Head: Normocephalic and atraumatic.      Right Ear: External ear normal.      Left Ear: External ear normal.   Eyes:      Extraocular Movements: Extraocular movements intact.      Conjunctiva/sclera: Conjunctivae normal.      Pupils: Pupils are equal, round, and reactive to light.   Cardiovascular:      Rate and Rhythm: Normal rate and regular rhythm.      Heart sounds: No " murmur heard.     Comments: +click  Pulmonary:      Effort: Pulmonary effort is normal.      Breath sounds: Normal breath sounds. No wheezing, rhonchi or rales.   Abdominal:      General: Bowel sounds are normal. There is no distension.      Palpations: Abdomen is soft.      Tenderness: There is no abdominal tenderness.   Musculoskeletal:         General: Normal range of motion.   Neurological:      Mental Status: He is alert.   Psychiatric:         Mood and Affect: Affect normal.          Lab Results   Component Value Date    GLUCOSE 91 10/21/2022    BUN 15 10/21/2022    CREATININE 1.24 10/21/2022    EGFRIFNONA 70 12/07/2021    EGFRIFAFRI 92 01/08/2018    BCR 12.1 10/21/2022    K 3.9 10/21/2022    CO2 27.4 10/21/2022    CALCIUM 9.8 10/21/2022    ALBUMIN 4.70 09/13/2022    LABIL2 1.5 03/11/2021    AST 38 09/13/2022    ALT 38 09/13/2022       Lab Results   Component Value Date    CHOL 121 09/13/2022    CHLPL 125 03/11/2021    TRIG 182 (H) 09/13/2022    HDL 35 (L) 09/13/2022    LDL 56 09/13/2022            Assessment and Plan    Diagnoses and all orders for this visit:    1. Type 2 diabetes mellitus without complication, without long-term current use of insulin (HCC) (Primary)  Assessment & Plan:  He is on metformin for diabetes.  Refills sent.  He is tolerating it well.  Last A1c was 6.2 at the end of October.  He is on a statin, ARB.  He is UTD on foot exam (3/2022).  He is due for eye exam, last done 12/2021.  He is scheduled to go to [facility] next week for that.    Orders:  -     metFORMIN (GLUCOPHAGE) 500 MG tablet; Take 1 tablet by mouth Daily.  Dispense: 90 tablet; Refill: 1    2. Essential hypertension  Assessment & Plan:  Blood pressure at goal.  He is doing much better since the addition of the HCTZ.  He did continue the amlodipine after all but the pedal edema seems to have resolved and his blood pressure is certainly beautifully controlled today in clinic.  I think we will just plan to continue him  on all 4 agents since that seems to be working for him.  Continue amlodipine 10 mg daily, HCTZ 50 mg daily, losartan 50 mg twice daily and metoprolol succinate 100 mg daily.  No refills needed today.  Continue to monitor closely.  Labs are reviewed and up-to-date.      3. Mixed hyperlipidemia  Assessment & Plan:  Stable on pravastatin 20 mg daily.  No refills needed.  Labs reviewed and UTD.      4. Bipolar disorder, current episode mixed, mild (HCC)  Overview:  Following with Dr. Mccauley.    Assessment & Plan:  He is on a complex regimen of lithium, lorazepam, risperidone, pramipexole, and temazepam.      5. Hx of aortic valve replacement, mechanical  Assessment & Plan:  On warfarin for mechanical heart valve.  Okay for him to go ahead and get his INR today so he does not have to come back next week.        Follow Up   Return in about 4 months (around 4/29/2023) for Recheck.  Patient was given instructions and counseling regarding his condition or for health maintenance advice. Please see specific information pulled into the AVS if appropriate.           12/29/2022

## 2023-01-20 ENCOUNTER — APPOINTMENT (OUTPATIENT)
Dept: CT IMAGING | Facility: HOSPITAL | Age: 58
End: 2023-01-20
Payer: COMMERCIAL

## 2023-01-20 ENCOUNTER — OFFICE VISIT (OUTPATIENT)
Dept: FAMILY MEDICINE CLINIC | Age: 58
End: 2023-01-20
Payer: COMMERCIAL

## 2023-01-20 ENCOUNTER — APPOINTMENT (OUTPATIENT)
Dept: GENERAL RADIOLOGY | Facility: HOSPITAL | Age: 58
End: 2023-01-20
Payer: COMMERCIAL

## 2023-01-20 ENCOUNTER — HOSPITAL ENCOUNTER (OUTPATIENT)
Facility: HOSPITAL | Age: 58
Setting detail: OBSERVATION
Discharge: HOME | End: 2023-01-22
Attending: EMERGENCY MEDICINE | Admitting: INTERNAL MEDICINE
Payer: COMMERCIAL

## 2023-01-20 ENCOUNTER — ANTICOAGULATION VISIT (OUTPATIENT)
Dept: FAMILY MEDICINE CLINIC | Age: 58
End: 2023-01-20
Payer: COMMERCIAL

## 2023-01-20 VITALS
DIASTOLIC BLOOD PRESSURE: 78 MMHG | HEART RATE: 90 BPM | OXYGEN SATURATION: 92 % | BODY MASS INDEX: 39.84 KG/M2 | TEMPERATURE: 98.1 F | HEIGHT: 71 IN | WEIGHT: 284.6 LBS | SYSTOLIC BLOOD PRESSURE: 128 MMHG

## 2023-01-20 DIAGNOSIS — Z95.2 HX OF AORTIC VALVE REPLACEMENT, MECHANICAL: Primary | ICD-10-CM

## 2023-01-20 DIAGNOSIS — I10 ESSENTIAL HYPERTENSION: ICD-10-CM

## 2023-01-20 DIAGNOSIS — J96.01 ACUTE RESPIRATORY FAILURE WITH HYPOXIA: Primary | ICD-10-CM

## 2023-01-20 DIAGNOSIS — R91.1 PULMONARY NODULE: ICD-10-CM

## 2023-01-20 DIAGNOSIS — I71.21 ANEURYSM OF ASCENDING AORTA WITHOUT RUPTURE: ICD-10-CM

## 2023-01-20 DIAGNOSIS — G47.33 OSA (OBSTRUCTIVE SLEEP APNEA): ICD-10-CM

## 2023-01-20 DIAGNOSIS — R41.0 CONFUSION: ICD-10-CM

## 2023-01-20 DIAGNOSIS — R41.0 DISORIENTATION: ICD-10-CM

## 2023-01-20 PROBLEM — R41.82 ALTERED MENTAL STATUS: Status: ACTIVE | Noted: 2023-01-20

## 2023-01-20 LAB
ALBUMIN SERPL-MCNC: 4.4 G/DL (ref 3.5–5.2)
ALBUMIN/GLOB SERPL: 1.5 G/DL
ALP SERPL-CCNC: 68 U/L (ref 39–117)
ALT SERPL W P-5'-P-CCNC: 26 U/L (ref 1–41)
AMMONIA BLD-SCNC: 16 UMOL/L (ref 16–60)
AMPHET+METHAMPHET UR QL: NEGATIVE
ANION GAP SERPL CALCULATED.3IONS-SCNC: 8.6 MMOL/L (ref 5–15)
AST SERPL-CCNC: 33 U/L (ref 1–40)
BARBITURATES UR QL SCN: NEGATIVE
BASOPHILS # BLD AUTO: 0.05 10*3/MM3 (ref 0–0.2)
BASOPHILS NFR BLD AUTO: 0.9 % (ref 0–1.5)
BENZODIAZ UR QL SCN: NEGATIVE
BILIRUB SERPL-MCNC: 0.5 MG/DL (ref 0–1.2)
BILIRUB UR QL STRIP: NEGATIVE
BUN SERPL-MCNC: 20 MG/DL (ref 6–20)
BUN/CREAT SERPL: 18.7 (ref 7–25)
CALCIUM SPEC-SCNC: 9.6 MG/DL (ref 8.6–10.5)
CANNABINOIDS SERPL QL: NEGATIVE
CHLORIDE SERPL-SCNC: 98 MMOL/L (ref 98–107)
CLARITY UR: CLEAR
CO2 SERPL-SCNC: 29.4 MMOL/L (ref 22–29)
COCAINE UR QL: NEGATIVE
COLOR UR: YELLOW
CREAT SERPL-MCNC: 1.07 MG/DL (ref 0.76–1.27)
DEPRECATED RDW RBC AUTO: 44.3 FL (ref 37–54)
EGFRCR SERPLBLD CKD-EPI 2021: 80.4 ML/MIN/1.73
EOSINOPHIL # BLD AUTO: 0.22 10*3/MM3 (ref 0–0.4)
EOSINOPHIL NFR BLD AUTO: 3.8 % (ref 0.3–6.2)
ERYTHROCYTE [DISTWIDTH] IN BLOOD BY AUTOMATED COUNT: 13.1 % (ref 12.3–15.4)
ETHANOL BLD-MCNC: <10 MG/DL (ref 0–10)
ETHANOL UR QL: <0.01 %
GLOBULIN UR ELPH-MCNC: 3 GM/DL
GLUCOSE BLDC GLUCOMTR-MCNC: 107 MG/DL (ref 70–99)
GLUCOSE SERPL-MCNC: 137 MG/DL (ref 65–99)
GLUCOSE UR STRIP-MCNC: NEGATIVE MG/DL
HCT VFR BLD AUTO: 41.2 % (ref 37.5–51)
HGB BLD-MCNC: 13.7 G/DL (ref 13–17.7)
HGB UR QL STRIP.AUTO: NEGATIVE
HOLD SPECIMEN: NORMAL
HOLD SPECIMEN: NORMAL
IMM GRANULOCYTES # BLD AUTO: 0.06 10*3/MM3 (ref 0–0.05)
IMM GRANULOCYTES NFR BLD AUTO: 1 % (ref 0–0.5)
INR PPP: 1.99 (ref 0.86–1.15)
INR PPP: 2.2 (ref 2.5–3.5)
KETONES UR QL STRIP: ABNORMAL
LEUKOCYTE ESTERASE UR QL STRIP.AUTO: NEGATIVE
LITHIUM SERPL-SCNC: 0.5 MMOL/L (ref 0.6–1.2)
LYMPHOCYTES # BLD AUTO: 1.19 10*3/MM3 (ref 0.7–3.1)
LYMPHOCYTES NFR BLD AUTO: 20.7 % (ref 19.6–45.3)
MAGNESIUM SERPL-MCNC: 1.7 MG/DL (ref 1.6–2.6)
MCH RBC QN AUTO: 30.6 PG (ref 26.6–33)
MCHC RBC AUTO-ENTMCNC: 33.3 G/DL (ref 31.5–35.7)
MCV RBC AUTO: 92.2 FL (ref 79–97)
METHADONE UR QL SCN: NEGATIVE
MONOCYTES # BLD AUTO: 0.66 10*3/MM3 (ref 0.1–0.9)
MONOCYTES NFR BLD AUTO: 11.5 % (ref 5–12)
NEUTROPHILS NFR BLD AUTO: 3.58 10*3/MM3 (ref 1.7–7)
NEUTROPHILS NFR BLD AUTO: 62.1 % (ref 42.7–76)
NITRITE UR QL STRIP: NEGATIVE
NRBC BLD AUTO-RTO: 0 /100 WBC (ref 0–0.2)
NT-PROBNP SERPL-MCNC: 177.6 PG/ML (ref 0–900)
OPIATES UR QL: NEGATIVE
OXYCODONE UR QL SCN: NEGATIVE
PH UR STRIP.AUTO: 5.5 [PH] (ref 5–8)
PLATELET # BLD AUTO: 161 10*3/MM3 (ref 140–450)
PMV BLD AUTO: 10.6 FL (ref 6–12)
POTASSIUM SERPL-SCNC: 4.4 MMOL/L (ref 3.5–5.2)
PROT SERPL-MCNC: 7.4 G/DL (ref 6–8.5)
PROT UR QL STRIP: ABNORMAL
PROTHROMBIN TIME: 23 SECONDS (ref 11.8–14.9)
RBC # BLD AUTO: 4.47 10*6/MM3 (ref 4.14–5.8)
SARS-COV-2 RNA PNL SPEC NAA+PROBE: NOT DETECTED
SODIUM SERPL-SCNC: 136 MMOL/L (ref 136–145)
SP GR UR STRIP: 1.02 (ref 1–1.03)
TROPONIN T SERPL-MCNC: <0.01 NG/ML (ref 0–0.03)
UROBILINOGEN UR QL STRIP: ABNORMAL
WBC NRBC COR # BLD: 5.76 10*3/MM3 (ref 3.4–10.8)
WHOLE BLOOD HOLD COAG: NORMAL
WHOLE BLOOD HOLD SPECIMEN: NORMAL

## 2023-01-20 PROCEDURE — 71045 X-RAY EXAM CHEST 1 VIEW: CPT

## 2023-01-20 PROCEDURE — 83880 ASSAY OF NATRIURETIC PEPTIDE: CPT | Performed by: EMERGENCY MEDICINE

## 2023-01-20 PROCEDURE — 80307 DRUG TEST PRSMV CHEM ANLYZR: CPT | Performed by: EMERGENCY MEDICINE

## 2023-01-20 PROCEDURE — 82140 ASSAY OF AMMONIA: CPT | Performed by: EMERGENCY MEDICINE

## 2023-01-20 PROCEDURE — G0378 HOSPITAL OBSERVATION PER HR: HCPCS

## 2023-01-20 PROCEDURE — 93005 ELECTROCARDIOGRAM TRACING: CPT | Performed by: EMERGENCY MEDICINE

## 2023-01-20 PROCEDURE — 93010 ELECTROCARDIOGRAM REPORT: CPT | Performed by: INTERNAL MEDICINE

## 2023-01-20 PROCEDURE — U0004 COV-19 TEST NON-CDC HGH THRU: HCPCS

## 2023-01-20 PROCEDURE — 99284 EMERGENCY DEPT VISIT MOD MDM: CPT

## 2023-01-20 PROCEDURE — 70450 CT HEAD/BRAIN W/O DYE: CPT

## 2023-01-20 PROCEDURE — 85610 PROTHROMBIN TIME: CPT | Performed by: EMERGENCY MEDICINE

## 2023-01-20 PROCEDURE — 85025 COMPLETE CBC W/AUTO DIFF WBC: CPT | Performed by: EMERGENCY MEDICINE

## 2023-01-20 PROCEDURE — 84484 ASSAY OF TROPONIN QUANT: CPT | Performed by: EMERGENCY MEDICINE

## 2023-01-20 PROCEDURE — 80178 ASSAY OF LITHIUM: CPT | Performed by: EMERGENCY MEDICINE

## 2023-01-20 PROCEDURE — 85610 PROTHROMBIN TIME: CPT | Performed by: FAMILY MEDICINE

## 2023-01-20 PROCEDURE — 80053 COMPREHEN METABOLIC PANEL: CPT | Performed by: EMERGENCY MEDICINE

## 2023-01-20 PROCEDURE — 36416 COLLJ CAPILLARY BLOOD SPEC: CPT | Performed by: FAMILY MEDICINE

## 2023-01-20 PROCEDURE — 81003 URINALYSIS AUTO W/O SCOPE: CPT | Performed by: EMERGENCY MEDICINE

## 2023-01-20 PROCEDURE — 82077 ASSAY SPEC XCP UR&BREATH IA: CPT | Performed by: EMERGENCY MEDICINE

## 2023-01-20 PROCEDURE — 83735 ASSAY OF MAGNESIUM: CPT | Performed by: EMERGENCY MEDICINE

## 2023-01-20 PROCEDURE — 71260 CT THORAX DX C+: CPT

## 2023-01-20 PROCEDURE — 99214 OFFICE O/P EST MOD 30 MIN: CPT | Performed by: FAMILY MEDICINE

## 2023-01-20 PROCEDURE — 36415 COLL VENOUS BLD VENIPUNCTURE: CPT | Performed by: EMERGENCY MEDICINE

## 2023-01-20 PROCEDURE — C9803 HOPD COVID-19 SPEC COLLECT: HCPCS

## 2023-01-20 PROCEDURE — 82962 GLUCOSE BLOOD TEST: CPT

## 2023-01-20 RX ORDER — SODIUM CHLORIDE 0.9 % (FLUSH) 0.9 %
10 SYRINGE (ML) INJECTION AS NEEDED
Status: DISCONTINUED | OUTPATIENT
Start: 2023-01-20 | End: 2023-01-22 | Stop reason: HOSPADM

## 2023-01-20 RX ADMIN — IOPAMIDOL 100 ML: 755 INJECTION, SOLUTION INTRAVENOUS at 23:50

## 2023-01-20 NOTE — ED PROVIDER NOTES
Time: 5:21 PM EST  Date of encounter:  1/20/2023  Independent Historian/Clinical History and Information was obtained by:   Patient and Family  Chief complaint: AMS  History is limited by: N/A    History of Present Illness:  Patient is a 57 y.o. year old male who presents to the emergency department for evaluation of altered mental status.    Pt wife is at bedside and giving some history.    Patient complains of feeling confused.. Patient additionally describes felling congested in his face. The patient's confusion has been intermittent since it began last week. The patient's wife says that she has noticed him being confused for the past 2 days. Pt wife notes a tremor. Patient describes his confusion as forgetting how to do things. This is the first time he has felt this confusion. Pt denies back pain, abdominal pain, nausea, and vomiting. Patient denies sore throat, fever, muscle aches, or chills. Patient has shortness of breath when moving. Patient has chronic and unchanged swelling in his lower extremities. Patient admits diarrhea. He denies any focal weakness or numbness.  Patient does not use oxygen when at home. Patient is not an alcoholic.    He saw his PCP today. He believes his Lithium is high. He denies any medication chages.           Patient Care Team  Primary Care Provider: Isabella Barber MD    Past Medical History:     Allergies   Allergen Reactions   • Shellfish-Derived Products Unknown - Low Severity     Past Medical History:   Diagnosis Date   • Chest pain    • CHF (congestive heart failure) (HCC)    • Heart disease    • High blood pressure    • Hx of blood diseases    • Kidney stone    • Polycystic kidney disease    • Sleep apnea      Past Surgical History:   Procedure Laterality Date   • AORTIC VALVE REPAIR/REPLACEMENT     • CARDIAC SURGERY     • GALLBLADDER SURGERY       Family History   Problem Relation Age of Onset   • Colon cancer Sister         40s   • Prostate cancer Brother          40s   • Colon cancer Brother         40s   • Colon cancer Other        Home Medications:  Prior to Admission medications    Medication Sig Start Date End Date Taking? Authorizing Provider   amLODIPine (NORVASC) 10 MG tablet Take 1 tablet by mouth once daily 10/25/22   Isabella Barber MD   hydroCHLOROthiazide (HYDRODIURIL) 50 MG tablet Take 1 tablet by mouth Daily. 9/23/22   Isabella Barber MD   lithium (ESKALITH) 450 MG CR tablet Take 450 mg by mouth 2 (Two) Times a Day. 7/14/21   Dimitri Barragan MD   LORazepam (ATIVAN) 1 MG tablet TAKE 1 TABLET BY MOUTH THREE TIMES DAILY AND 1 TABLET AS NEEDED MUST LAST 30 DAYS 7/1/21   Dimitri Barragan MD   losartan (COZAAR) 25 MG tablet Take 2 tablets by mouth 2 (Two) Times a Day. 9/23/22   Isabella Barber MD   metFORMIN (GLUCOPHAGE) 500 MG tablet Take 1 tablet by mouth Daily. 12/29/22   Isabella Barber MD   metoprolol succinate XL (TOPROL-XL) 100 MG 24 hr tablet Take 1 tablet by mouth Daily. 10/18/22   Isabella Barber MD   ondansetron ODT (Zofran ODT) 4 MG disintegrating tablet Place 1 tablet on the tongue Every 8 (Eight) Hours As Needed for Nausea or Vomiting. 12/7/21   Isabella Barber MD   pramipexole (MIRAPEX) 0.5 MG tablet Take 0.5 mg by mouth 2 (Two) Times a Day. 7/14/21   Dimitri Barragan MD   pravastatin (PRAVACHOL) 20 MG tablet Take 1 tablet by mouth every night at bedtime. 10/18/22   Isabella Barber MD   risperiDONE (risperDAL) 3 MG tablet Take 3 mg by mouth every night at bedtime. 7/14/21   Dimitri Barragan MD   tamsulosin (FLOMAX) 0.4 MG capsule 24 hr capsule Take 1 capsule by mouth Daily for 360 days. 10/10/22 10/5/23  Ella Agrawal MD   temazepam (RESTORIL) 30 MG capsule Take 30 mg by mouth Daily. 8/18/21   Dimitri Barragan MD   warfarin (COUMADIN) 1 MG tablet Take 1 tablet by mouth Every Night. 10/18/22   Isabella Barber MD   warfarin (COUMADIN) 5 MG tablet TAKE  "1 TABLET BY MOUTH ONCE DAILY AT NIGHT 11/15/22   Isabella Barber MD        Social History:   Social History     Tobacco Use   • Smoking status: Never   • Smokeless tobacco: Never   Vaping Use   • Vaping Use: Never used   Substance Use Topics   • Alcohol use: Yes     Alcohol/week: 2.0 standard drinks     Types: 2 Cans of beer per week   • Drug use: Never         Review of Systems:  Review of Systems   Constitutional: Negative for chills, diaphoresis and fever.   HENT: Positive for congestion. Negative for postnasal drip, rhinorrhea and sore throat.    Eyes: Negative for photophobia.   Respiratory: Positive for shortness of breath (When moving). Negative for cough and chest tightness.    Cardiovascular: Negative for chest pain and palpitations.   Gastrointestinal: Negative for abdominal pain, diarrhea, nausea and vomiting.   Genitourinary: Negative for difficulty urinating, dysuria, flank pain, frequency, hematuria and urgency.   Musculoskeletal: Negative for neck pain and neck stiffness.   Skin: Negative for pallor and rash.   Neurological: Positive for tremors. Negative for dizziness, syncope, weakness, numbness and headaches.   Hematological: Negative for adenopathy. Does not bruise/bleed easily.   Psychiatric/Behavioral: Positive for confusion.        Physical Exam:  /66 (BP Location: Right arm, Patient Position: Lying)   Pulse 62   Temp 97.9 °F (36.6 °C) (Oral)   Resp 16   Ht 180.3 cm (71\")   Wt 128 kg (282 lb 10.1 oz)   SpO2 98%   BMI 39.42 kg/m²     Physical Exam  Vitals and nursing note reviewed.   Constitutional:       General: He is not in acute distress.     Appearance: Normal appearance. He is not ill-appearing, toxic-appearing or diaphoretic.   HENT:      Head: Normocephalic and atraumatic.      Mouth/Throat:      Mouth: Mucous membranes are moist.   Eyes:      Extraocular Movements: Extraocular movements intact.      Conjunctiva/sclera: Conjunctivae normal.      Pupils: Pupils are " equal, round, and reactive to light.   Cardiovascular:      Rate and Rhythm: Normal rate and regular rhythm.      Pulses: Normal pulses.           Carotid pulses are 2+ on the right side and 2+ on the left side.       Radial pulses are 2+ on the right side and 2+ on the left side.        Femoral pulses are 2+ on the right side and 2+ on the left side.       Popliteal pulses are 2+ on the right side and 2+ on the left side.        Dorsalis pedis pulses are 2+ on the right side and 2+ on the left side.        Posterior tibial pulses are 2+ on the right side and 2+ on the left side.      Heart sounds: Murmur heard.    Systolic murmur is present.     Comments: Bilateral trace edema in lower extremities. Opening click with systolic ejection murmur.    Pulmonary:      Effort: Pulmonary effort is normal. No accessory muscle usage, respiratory distress or retractions.      Breath sounds: Normal breath sounds. No wheezing, rhonchi or rales.      Comments: On examination, pulse ox goes down to 85%.  Abdominal:      General: Abdomen is flat. There is distension.      Palpations: Abdomen is soft. There is no mass.      Tenderness: There is abdominal tenderness in the right upper quadrant. There is no right CVA tenderness, left CVA tenderness, guarding or rebound.      Comments: No rigidity   Musculoskeletal:         General: No swelling, tenderness or deformity.      Cervical back: Neck supple. No tenderness.      Right lower leg: Edema present.      Left lower leg: Edema present.   Skin:     General: Skin is warm and dry.      Capillary Refill: Capillary refill takes less than 2 seconds.      Coloration: Skin is not cyanotic, jaundiced or pale.      Findings: No erythema.   Neurological:      General: No focal deficit present.      Mental Status: He is alert and oriented to person, place, and time. Mental status is at baseline.      Cranial Nerves: Cranial nerves 2-12 are intact. No cranial nerve deficit.      Sensory: No  sensory deficit.      Motor: Motor function is intact. No weakness or pronator drift.      Coordination: Coordination is intact. Finger-Nose-Finger Test (Bilateral) normal.      Comments: No pronator drift in upper and lower exptremities. No focal weakness or numbness in upper and lower exptremities.   Psychiatric:         Attention and Perception: Attention and perception normal.         Mood and Affect: Mood normal.         Behavior: Behavior normal.                  Procedures:  Procedures      Medical Decision Making:      Comorbidities that affect care:    Congestive Heart Failure    External Notes reviewed:    None      The following orders were placed and all results were independently analyzed by me:  Orders Placed This Encounter   Procedures   • COVID-19,APTIMA PANTHER(YUE),[facility]/ [facility], NP/OP SWAB IN UTM/VTM/[facility] TRANSPORT MEDIA,24 HR TAT - Swab, Nasal Cavity   • Influenza Antigen, Rapid - Swab, Nasopharynx   • RSV Screen - Swab, Nasopharynx   • XR Chest 1 View   • CT Head Without Contrast   • CT Chest With Contrast Diagnostic   • [place] Draw   • Comprehensive Metabolic Panel   • Troponin   • Magnesium   • Urinalysis With Culture If Indicated -   • Lithium Level   • CBC Auto Differential   • Protime-INR   • Ammonia   • Ethanol   • Urine Drug Screen - Urine, Clean Catch   • BNP   • Blood Gas, Arterial -With Co-Ox Panel: Yes   • Protime-INR   • CBC Auto Differential   • Comprehensive Metabolic Panel   • CBC (No Diff)   • Basic Metabolic Panel   • Diet: Cardiac Diets; Healthy Heart (2-3 Na+); Texture: Regular Texture (IDDSI 7); Fluid Consistency: Thin (IDDSI 0)   • Undress & Gown   • Continuous Pulse Oximetry   • Vital Signs   • Orthostatic Blood Pressure   • Vital Signs   • Intake & Output   • Weigh Patient   • Oral Care   • Telemetry - Maintain IV Access   • Continuous Cardiac Monitoring   • Telemetry - Pulse Oximetry   • Up With Assistance   • Neuro Checks   • Notify Provider (With Default Parameters)    • Code Status and Medical Interventions:   • Inpatient Hospitalist Consult   • Oxygen Therapy- Nasal Cannula; Titrate for SPO2: 90% - 95%   • Oxygen Therapy- Nasal Cannula; Titrate for SPO2: 90% - 95%   • Patient May Use Home CPAP / BIPAP For Sleep or As Needed   • NIPPV (CPAP or BIPAP)   • POC Glucose Once   • POC Glucose Once   • POC Glucose TID AC   • POC Glucose Once   • POC Glucose Once   • ECG 12 Lead ED Triage Standing Order; Weak / Dizzy / AMS   • ECG 12 Lead Chest Pain   • EEG   • Insert Peripheral IV   • Insert Peripheral IV   • Initiate Observation Status   • Fall Precautions   • CBC & Differential   • Green Top (Gel)   • Lavender Top   • Gold Top - SST   • Light Blue Top       Medications Given in the Emergency Department:  Medications   sodium chloride 0.9 % flush 10 mL (has no administration in time range)   losartan (COZAAR) tablet 50 mg (50 mg Oral Given 1/21/23 2051)   pravastatin (PRAVACHOL) tablet 20 mg (20 mg Oral Given 1/21/23 2051)   Pharmacy to dose warfarin (has no administration in time range)   sodium chloride 0.9 % flush 10 mL (has no administration in time range)   sodium chloride 0.9 % flush 10 mL (10 mL Intravenous Given 1/21/23 2051)   sodium chloride 0.9 % infusion 40 mL (has no administration in time range)   nitroglycerin (NITROSTAT) SL tablet 0.4 mg (has no administration in time range)   acetaminophen (TYLENOL) tablet 650 mg (has no administration in time range)     Or   acetaminophen (TYLENOL) 160 MG/5ML solution 650 mg (has no administration in time range)     Or   acetaminophen (TYLENOL) suppository 650 mg (has no administration in time range)   ondansetron (ZOFRAN) injection 4 mg (has no administration in time range)   ipratropium-albuterol (DUO-NEB) nebulizer solution 3 mL (has no administration in time range)   warfarin (COUMADIN) tablet 6 mg (6 mg Oral Given 1/21/23 1725)   risperiDONE (risperDAL) tablet 3 mg (3 mg Oral Given 1/21/23 2051)   tamsulosin (FLOMAX) 24 hr  capsule 0.4 mg (0.4 mg Oral Given 1/21/23 1144)   pramipexole (MIRAPEX) tablet 0.5 mg (0.5 mg Oral Given 1/21/23 2051)   dextrose (GLUTOSE) oral gel 15 g (has no administration in time range)   dextrose (D50W) (25 g/50 mL) IV injection 25 g (has no administration in time range)   glucagon (GLUCAGEN) injection 1 mg (has no administration in time range)   Insulin Lispro (humaLOG) injection 2-7 Units (2 Units Subcutaneous Not Given 1/21/23 1727)   carvedilol (COREG) tablet 12.5 mg (has no administration in time range)   amLODIPine (NORVASC) tablet 5 mg (has no administration in time range)   iopamidol (ISOVUE-370) 76 % injection 100 mL (100 mL Intravenous Given 1/20/23 2350)   warfarin (COUMADIN) tablet 6 mg (6 mg Oral Given 1/21/23 0325)        ED Course:    The patient was initially evaluated in the triage area where orders were placed. The patient was later dispositioned by Al Monge DO.      The patient was advised to stay for completion of workup which includes but is not limited to communication of labs and radiological results, reassessment and plan. The patient was advised that leaving prior to disposition by a provider could result in critical findings that are not communicated to the patient.     ED Course as of 01/22/23 0102 Fri Jan 20, 2023 1722 --- PROVIDER IN TRIAGE NOTE ---    The patient was evaluated my Virginia peres in triage. Orders were placed and the patient is currently awaiting disposition.    [AJ]   1733 EKG:    Rhythm: Sinus rhythm  Rate: 69  Intervals: Normal LA and QT interval  T-wave: T wave inversion V5, V6, I, aVL no other EKG available for comparison  ST Segment: Nonspecific ST segment III, V3, no obvious pathological ST elevation or ST depression to suggest acute myocardial infarction    EKG Comparison: No EKG available for comparison    Interpreted by me   [SD]      ED Course User Index  [AJ] Virginia Lew PA-C  [SD] Al Monge DO       Labs:    Lab Results  (last 24 hours)     Procedure Component Value Units Date/Time    Blood Gas, Arterial -With Co-Ox Panel: Yes [[id number]]  (Abnormal) Collected: 01/21/23 0141    Specimen: Arterial Blood from Arm, Right Updated: 01/21/23 0144     pH, Arterial 7.374 pH units      pCO2, Arterial 48.0 mm Hg      pO2, Arterial 61.4 mm Hg      HCO3, Arterial 27.4 mmol/L      Base Excess, Arterial 1.5 mmol/L      O2 Saturation, Arterial 91.0 %      Hemoglobin, Blood Gas 13.9 g/dL      Carboxyhemoglobin 1.4 %      Methemoglobin 0.20 %      Oxyhemoglobin 89.5 %      FHHB 8.9 %      Site Arterial: right brachial     Modality Nasal Cannula     FIO2 36 %      Flow Rate 4 lpm      PO2/FIO2 171     Lactate, Arterial --    Protime-INR [[id number]]  (Abnormal) Collected: 01/21/23 0516    Specimen: Blood Updated: 01/21/23 0633     Protime 20.5 Seconds      INR 1.73    Narrative:      Suggested Therapeutic Ranges For Oral Anticoagulant Therapy:  Level of Therapy                      INR Target Range  Standard Dose                            2.0-3.0  High Dose                                2.5-3.5  Patients not receiving anticoagulant  Therapy Normal Range                     0.86-1.15    CBC Auto Differential [[id number]]  (Abnormal) Collected: 01/21/23 0516    Specimen: Blood Updated: 01/21/23 0826     WBC 4.23 10*3/mm3      RBC 4.49 10*6/mm3      Hemoglobin 13.5 g/dL      Hematocrit 41.1 %      MCV 91.5 fL      MCH 30.1 pg      MCHC 32.8 g/dL      RDW 12.9 %      RDW-SD 43.3 fl      MPV 10.9 fL      Platelets 151 10*3/mm3      Neutrophil % 59.0 %      Lymphocyte % 20.6 %      Monocyte % 12.8 %      Eosinophil % 4.7 %      Basophil % 1.2 %      Immature Grans % 1.7 %      Neutrophils, Absolute 2.50 10*3/mm3      Lymphocytes, Absolute 0.87 10*3/mm3      Monocytes, Absolute 0.54 10*3/mm3      Eosinophils, Absolute 0.20 10*3/mm3      Basophils, Absolute 0.05 10*3/mm3      Immature Grans, Absolute 0.07 10*3/mm3      nRBC 0.0 /100 WBC     Comprehensive  Metabolic Panel [[id number]]  (Abnormal) Collected: 01/21/23 0516    Specimen: Blood Updated: 01/21/23 0645     Glucose 217 mg/dL      BUN 16 mg/dL      Creatinine 1.07 mg/dL      Sodium 136 mmol/L      Potassium 4.1 mmol/L      Chloride 99 mmol/L      CO2 27.0 mmol/L      Calcium 9.3 mg/dL      Total Protein 6.9 g/dL      Albumin 4.2 g/dL      ALT (SGPT) 27 U/L      AST (SGOT) 37 U/L      Alkaline Phosphatase 59 U/L      Total Bilirubin 0.5 mg/dL      Globulin 2.7 gm/dL      A/G Ratio 1.6 g/dL      BUN/Creatinine Ratio 15.0     Anion Gap 10.0 mmol/L      eGFR 80.4 mL/min/1.73     Narrative:      GFR Normal >60  Chronic Kidney Disease <60  Kidney Failure <15      Influenza Antigen, Rapid - Swab, Nasopharynx [[id number]]  (Normal) Collected: 01/21/23 1039    Specimen: Swab from Nasopharynx Updated: 01/21/23 1114     Influenza A Ag, EIA Negative     Influenza B Ag, EIA Negative    RSV Screen - Swab, Nasopharynx [[id number]]  (Normal) Collected: 01/21/23 1039    Specimen: Swab from Nasopharynx Updated: 01/21/23 1114     RSV Rapid Ag Negative    POC Glucose Once [[id number]]  (Abnormal) Collected: 01/21/23 1146    Specimen: Blood Updated: 01/21/23 1148     Glucose 132 mg/dL      Comment: Serial Number: [id number]:  [id number]       POC Glucose Once [[id number]]  (Abnormal) Collected: 01/21/23 1722    Specimen: Blood Updated: 01/21/23 1723     Glucose 136 mg/dL      Comment: Serial Number: [id number]:  [id number]              Imaging:    No Radiology Exams Resulted Within Past 24 Hours      Differential Diagnosis and Discussion:      Altered Mental Status: Based on the patient's signs and symptoms, differential diagnosis includes but is not limited to meningitis, stroke, sepsis, subarachnoid hemorrhage, intracranial bleeding, encephalitis, and metabolic encephalopathy.    All labs were reviewed and analyzed by me.    MDM  Number of Diagnoses or Management Options  Acute respiratory failure with hypoxia  (HCC)  Aneurysm of ascending aorta without rupture  Confusion  Pulmonary nodule  Diagnosis management comments: On examination, pulse ox goes down to 85%.    I reviewed the patient's CT scan findings.  I have discussed the incidental findings with the patient they understand the importance of following up on the pulmonary nodules as well as the aneurysm.  The patient will review the CAT scan performed today with her primary care physician outpatient basis and determine the need for repeat CT examination in 6 to 12-month as well as possible need for vascular surgery referral    The patient continues to be hypoxic on room air.  The patient was subsequently admitted to the hospital for further evaluation of the confusion and hypoxia.       Amount and/or Complexity of Data Reviewed  Clinical lab tests: reviewed  Tests in the radiology section of CPT®: reviewed  Tests in the medicine section of CPT®: reviewed  Discuss the patient with other providers: yes (I discussed the case with the hospitalist.  We have discussed the patient's presenting symptoms, laboratory values, imaging and condition at the time of admission.  They will evaluate the patient in the emergency room and admit the patient to the hospital)           Patient Care Considerations:            Social Determinants of Health:    Patient is independent, reliable, and has access to care.       Disposition and Care Coordination:    Admit:   Through independent evaluation of the patient's history, physical, and imperical data, the patient meets criteria for observation/admission to the hospital.        Final diagnoses:   Acute respiratory failure with hypoxia (HCC)   Confusion   Pulmonary nodule   Aneurysm of ascending aorta without rupture        ED Disposition     ED Disposition   Decision to Admit    Condition   --    Comment   Level of Care: Remote Telemetry [26]   Diagnosis: Acute respiratory failure with hypoxia (HCC) [774482]               This medical  record created using voice recognition software.    Al LOREDO DO, am scribing for and in the presence of All Mireles MD. 1/22/2023 01:02 Flaco Reagan  01/20/23 2049       Jackson Malloy  01/20/23 2057       Al Monge DO  01/22/23 0102

## 2023-01-20 NOTE — ASSESSMENT & PLAN NOTE
Toro is well-known to me and presents as a walk-in to clinic this afternoon with his wife Maria Elena.  Maria Elena provided much of the history today.  She notes altered mental status that has been waxing and waning since he brought it to her attention around noon today.  (Prior to that, she was taking care of their grandchildren and had not been pay much attention.)  Toro does have some slurred speech at baseline, and I would say his speech is about normal for him today, but the altered mental status is quite new.  He is demonstrating poor concentration and keeps staring off into space, although he is not dozing and does not appear lethargic necessarily.  At times, he appears oriented but even in the 30-45 minutes that he was here in clinic today, he had periods of confusion in which he seemed to struggle to understand what was going on and had difficulty following directions.  He is demonstrating myoclonic jerks of the arms which are new today in clinic.  He denies focal weakness, paresthesias or numbness.  He is no longer having shortness of breath, but did have an O2 sat of 92% on room air today which is not his baseline.  He has no known lung disease.  He does have mechanical aortic valve for which he is on warfarin (INR goal range 2.5-3.5) and INR was checked today.  This was actually slightly subtherapeutic at 2.2.  No bleeding complaints.  Denies recent head trauma.  My biggest concern at this time is possible unintentional medication overdose/lithium toxicity.  Maria Elena is quite worried about him also, and given the turnaround time on outpatient labs, I think he would be best served by going on in to the ED tonight for emergent evaluation.  They plan to go over to [facility] and Maria Elena will drive him.

## 2023-01-20 NOTE — PROGRESS NOTES
"Chief Complaint  Fatigue (Ongoing since last night. Pt states he can not hold his own phone in his hand. )    Subjective          Freedom Stevens presents to [facility] today for acute issue of weakness.  Toro walked in today to the allergy room with complaint of weakness.    Last night, he was going to bed when he noticed that he started feeling bad.  He felt very \"nervous\" and \"my head felt like it was bleeding from the inside out.\"  He woke up this morning still feeling bad.  He was having trouble holding his flip phone and kept dropping it.  \"I felt like I was dying.\"  He felt very confused.  He was having a hard time breathing at one point, although that has since resolved.  He has some slurred speech that is baseline for him, but his wife reports that she couldn't understand what he was trying to tell her, although he was using real words and regular sentences.  He felt very tired.  He has been having jerking motion of the arms intermittently.  Demonstrates this in the exam room today.      He denies recent changes in his meds.  He is on lithium, managed by Psychiatry.  I questioned him about whether he could have accidentally taken more of his medication than prescribed, specifically the lithium, and he does states that this is a possibility \"because it comes in 2 bottles.\"        Current Outpatient Medications:   •  amLODIPine (NORVASC) 10 MG tablet, Take 1 tablet by mouth once daily, Disp: 90 tablet, Rfl: 1  •  hydroCHLOROthiazide (HYDRODIURIL) 50 MG tablet, Take 1 tablet by mouth Daily., Disp: 30 tablet, Rfl: 5  •  lithium (ESKALITH) 450 MG CR tablet, Take 450 mg by mouth 2 (Two) Times a Day., Disp: , Rfl:   •  LORazepam (ATIVAN) 1 MG tablet, TAKE 1 TABLET BY MOUTH THREE TIMES DAILY AND 1 TABLET AS NEEDED MUST LAST 30 DAYS, Disp: , Rfl:   •  losartan (COZAAR) 25 MG tablet, Take 2 tablets by mouth 2 (Two) Times a Day., Disp: 180 tablet, Rfl: 1  •  metFORMIN (GLUCOPHAGE) 500 " "MG tablet, Take 1 tablet by mouth Daily., Disp: 90 tablet, Rfl: 1  •  metoprolol succinate XL (TOPROL-XL) 100 MG 24 hr tablet, Take 1 tablet by mouth Daily., Disp: 90 tablet, Rfl: 1  •  ondansetron ODT (Zofran ODT) 4 MG disintegrating tablet, Place 1 tablet on the tongue Every 8 (Eight) Hours As Needed for Nausea or Vomiting., Disp: 20 tablet, Rfl: 0  •  pramipexole (MIRAPEX) 0.5 MG tablet, Take 0.5 mg by mouth 2 (Two) Times a Day., Disp: , Rfl:   •  pravastatin (PRAVACHOL) 20 MG tablet, Take 1 tablet by mouth every night at bedtime., Disp: 90 tablet, Rfl: 1  •  risperiDONE (risperDAL) 3 MG tablet, Take 3 mg by mouth every night at bedtime., Disp: , Rfl:   •  tamsulosin (FLOMAX) 0.4 MG capsule 24 hr capsule, Take 1 capsule by mouth Daily for 360 days., Disp: 90 capsule, Rfl: 3  •  temazepam (RESTORIL) 30 MG capsule, Take 30 mg by mouth Daily., Disp: , Rfl:   •  warfarin (COUMADIN) 1 MG tablet, Take 1 tablet by mouth Every Night., Disp: 90 tablet, Rfl: 1  •  warfarin (COUMADIN) 5 MG tablet, TAKE 1 TABLET BY MOUTH ONCE DAILY AT NIGHT, Disp: 90 tablet, Rfl: 1    Allergies:  Shellfish-derived products      Objective   Vital Signs:   Vitals:    01/20/23 1423   BP: 128/78   BP Location: Left arm   Patient Position: Sitting   Cuff Size: Large Adult   Pulse: 90   Temp: 98.1 °F (36.7 °C)   TempSrc: Oral   SpO2: 92%  Comment: room air   Weight: 129 kg (284 lb 9.6 oz)   Height: 180.3 cm (70.98\")       Physical Exam  Vitals reviewed.   Constitutional:       General: He is not in acute distress.     Appearance: Normal appearance. He is well-developed.   HENT:      Head: Normocephalic and atraumatic.      Right Ear: External ear normal.      Left Ear: External ear normal.   Eyes:      Extraocular Movements: Extraocular movements intact.      Conjunctiva/sclera: Conjunctivae normal.      Pupils: Pupils are equal, round, and reactive to light.   Cardiovascular:      Rate and Rhythm: Normal rate and regular rhythm.      Heart " sounds: No murmur heard.     Comments: +click  Pulmonary:      Effort: Pulmonary effort is normal.      Breath sounds: Normal breath sounds. No wheezing, rhonchi or rales.   Abdominal:      General: Bowel sounds are normal. There is no distension.      Palpations: Abdomen is soft.      Tenderness: There is no abdominal tenderness.   Musculoskeletal:         General: Normal range of motion.   Neurological:      Mental Status: He is alert.   Psychiatric:         Mood and Affect: Affect normal.          Lab Results   Component Value Date    GLUCOSE 91 10/21/2022    BUN 15 10/21/2022    CREATININE 1.24 10/21/2022    EGFRIFNONA 70 12/07/2021    EGFRIFAFRI 92 01/08/2018    BCR 12.1 10/21/2022    K 3.9 10/21/2022    CO2 27.4 10/21/2022    CALCIUM 9.8 10/21/2022    ALBUMIN 4.70 09/13/2022    LABIL2 1.5 03/11/2021    AST 38 09/13/2022    ALT 38 09/13/2022       Lab Results   Component Value Date    CHOL 121 09/13/2022    CHLPL 125 03/11/2021    TRIG 182 (H) 09/13/2022    HDL 35 (L) 09/13/2022    LDL 56 09/13/2022            Assessment and Plan    Diagnoses and all orders for this visit:    1. Hx of aortic valve replacement, mechanical (Primary)  -     POC INR    2. Disorientation  Assessment & Plan:  Toro is well-known to me and presents as a walk-in to clinic this afternoon with his wife Maria Elena.  Maria Elena provided much of the history today.  She notes altered mental status that has been waxing and waning since he brought it to her attention around noon today.  (Prior to that, she was taking care of their grandchildren and had not been pay much attention.)  Toro does have some slurred speech at baseline, and I would say his speech is about normal for him today, but the altered mental status is quite new.  He is demonstrating poor concentration and keeps staring off into space, although he is not dozing and does not appear lethargic necessarily.  At times, he appears oriented but even in the 30-45 minutes that he was here in  clinic today, he had periods of confusion in which he seemed to struggle to understand what was going on and had difficulty following directions.  He is demonstrating myoclonic jerks of the arms which are new today in clinic.  He denies focal weakness, paresthesias or numbness.  He is no longer having shortness of breath, but did have an O2 sat of 92% on room air today which is not his baseline.  He has no known lung disease.  He does have mechanical aortic valve for which he is on warfarin (INR goal range 2.5-3.5) and INR was checked today.  This was actually slightly subtherapeutic at 2.2.  No bleeding complaints.  Denies recent head trauma.  My biggest concern at this time is possible unintentional medication overdose/lithium toxicity.  Maria Elena is quite worried about him also, and given the turnaround time on outpatient labs, I think he would be best served by going on in to the ED tonight for emergent evaluation.  They plan to go over to [facility] and Maria Elena will drive him.        Follow Up   Return if symptoms worsen or fail to improve, for Or as schedule 4/28/2023.  Patient was given instructions and counseling regarding his condition or for health maintenance advice. Please see specific information pulled into the AVS if appropriate.           01/20/2023

## 2023-01-21 PROBLEM — J96.01 ACUTE RESPIRATORY FAILURE WITH HYPOXIA: Status: ACTIVE | Noted: 2023-01-21

## 2023-01-21 LAB
ALBUMIN SERPL-MCNC: 4.2 G/DL (ref 3.5–5.2)
ALBUMIN/GLOB SERPL: 1.6 G/DL
ALP SERPL-CCNC: 59 U/L (ref 39–117)
ALT SERPL W P-5'-P-CCNC: 27 U/L (ref 1–41)
ANION GAP SERPL CALCULATED.3IONS-SCNC: 10 MMOL/L (ref 5–15)
AST SERPL-CCNC: 37 U/L (ref 1–40)
BASE EXCESS BLDA CALC-SCNC: 1.5 MMOL/L (ref -2–2)
BASOPHILS # BLD AUTO: 0.05 10*3/MM3 (ref 0–0.2)
BASOPHILS NFR BLD AUTO: 1.2 % (ref 0–1.5)
BDY SITE: ABNORMAL
BILIRUB SERPL-MCNC: 0.5 MG/DL (ref 0–1.2)
BUN SERPL-MCNC: 16 MG/DL (ref 6–20)
BUN/CREAT SERPL: 15 (ref 7–25)
CALCIUM SPEC-SCNC: 9.3 MG/DL (ref 8.6–10.5)
CHLORIDE SERPL-SCNC: 99 MMOL/L (ref 98–107)
CO2 SERPL-SCNC: 27 MMOL/L (ref 22–29)
COHGB MFR BLD: 1.4 % (ref 0–1.5)
CREAT SERPL-MCNC: 1.07 MG/DL (ref 0.76–1.27)
DEPRECATED RDW RBC AUTO: 43.3 FL (ref 37–54)
EGFRCR SERPLBLD CKD-EPI 2021: 80.4 ML/MIN/1.73
EOSINOPHIL # BLD AUTO: 0.2 10*3/MM3 (ref 0–0.4)
EOSINOPHIL NFR BLD AUTO: 4.7 % (ref 0.3–6.2)
ERYTHROCYTE [DISTWIDTH] IN BLOOD BY AUTOMATED COUNT: 12.9 % (ref 12.3–15.4)
FHHB: 8.9 % (ref 0–5)
FLUAV AG NPH QL: NEGATIVE
FLUBV AG NPH QL IA: NEGATIVE
GAS FLOW AIRWAY: 4 LPM
GLOBULIN UR ELPH-MCNC: 2.7 GM/DL
GLUCOSE BLDC GLUCOMTR-MCNC: 132 MG/DL (ref 70–99)
GLUCOSE BLDC GLUCOMTR-MCNC: 136 MG/DL (ref 70–99)
GLUCOSE SERPL-MCNC: 217 MG/DL (ref 65–99)
HCO3 BLDA-SCNC: 27.4 MMOL/L (ref 22–26)
HCT VFR BLD AUTO: 41.1 % (ref 37.5–51)
HGB BLD-MCNC: 13.5 G/DL (ref 13–17.7)
HGB BLDA-MCNC: 13.9 G/DL (ref 13.8–16.4)
IMM GRANULOCYTES # BLD AUTO: 0.07 10*3/MM3 (ref 0–0.05)
IMM GRANULOCYTES NFR BLD AUTO: 1.7 % (ref 0–0.5)
INHALED O2 CONCENTRATION: 36 %
INR PPP: 1.73 (ref 0.86–1.15)
LACTATE BLDA-SCNC: ABNORMAL MMOL/L
LYMPHOCYTES # BLD AUTO: 0.87 10*3/MM3 (ref 0.7–3.1)
LYMPHOCYTES NFR BLD AUTO: 20.6 % (ref 19.6–45.3)
MCH RBC QN AUTO: 30.1 PG (ref 26.6–33)
MCHC RBC AUTO-ENTMCNC: 32.8 G/DL (ref 31.5–35.7)
MCV RBC AUTO: 91.5 FL (ref 79–97)
METHGB BLD QL: 0.2 % (ref 0–1.5)
MODALITY: ABNORMAL
MONOCYTES # BLD AUTO: 0.54 10*3/MM3 (ref 0.1–0.9)
MONOCYTES NFR BLD AUTO: 12.8 % (ref 5–12)
NEUTROPHILS NFR BLD AUTO: 2.5 10*3/MM3 (ref 1.7–7)
NEUTROPHILS NFR BLD AUTO: 59 % (ref 42.7–76)
NRBC BLD AUTO-RTO: 0 /100 WBC (ref 0–0.2)
OXYHGB MFR BLDV: 89.5 % (ref 94–99)
PCO2 BLDA: 48 MM HG (ref 35–45)
PH BLDA: 7.37 PH UNITS (ref 7.35–7.45)
PLATELET # BLD AUTO: 151 10*3/MM3 (ref 140–450)
PMV BLD AUTO: 10.9 FL (ref 6–12)
PO2 BLD: 171 MM[HG] (ref 0–500)
PO2 BLDA: 61.4 MM HG (ref 80–100)
POTASSIUM SERPL-SCNC: 4.1 MMOL/L (ref 3.5–5.2)
PROT SERPL-MCNC: 6.9 G/DL (ref 6–8.5)
PROTHROMBIN TIME: 20.5 SECONDS (ref 11.8–14.9)
RBC # BLD AUTO: 4.49 10*6/MM3 (ref 4.14–5.8)
RSV AG SPEC QL: NEGATIVE
SAO2 % BLDCOA: 91 % (ref 95–99)
SODIUM SERPL-SCNC: 136 MMOL/L (ref 136–145)
WBC NRBC COR # BLD: 4.23 10*3/MM3 (ref 3.4–10.8)

## 2023-01-21 PROCEDURE — G0378 HOSPITAL OBSERVATION PER HR: HCPCS

## 2023-01-21 PROCEDURE — 0 IOPAMIDOL PER 1 ML: Performed by: EMERGENCY MEDICINE

## 2023-01-21 PROCEDURE — 82805 BLOOD GASES W/O2 SATURATION: CPT | Performed by: EMERGENCY MEDICINE

## 2023-01-21 PROCEDURE — 36600 WITHDRAWAL OF ARTERIAL BLOOD: CPT | Performed by: EMERGENCY MEDICINE

## 2023-01-21 PROCEDURE — 94660 CPAP INITIATION&MGMT: CPT

## 2023-01-21 PROCEDURE — 85610 PROTHROMBIN TIME: CPT | Performed by: INTERNAL MEDICINE

## 2023-01-21 PROCEDURE — 80053 COMPREHEN METABOLIC PANEL: CPT | Performed by: INTERNAL MEDICINE

## 2023-01-21 PROCEDURE — 83050 HGB METHEMOGLOBIN QUAN: CPT | Performed by: EMERGENCY MEDICINE

## 2023-01-21 PROCEDURE — 87804 INFLUENZA ASSAY W/OPTIC: CPT | Performed by: INTERNAL MEDICINE

## 2023-01-21 PROCEDURE — 87807 RSV ASSAY W/OPTIC: CPT | Performed by: INTERNAL MEDICINE

## 2023-01-21 PROCEDURE — 94799 UNLISTED PULMONARY SVC/PX: CPT

## 2023-01-21 PROCEDURE — 99223 1ST HOSP IP/OBS HIGH 75: CPT | Performed by: INTERNAL MEDICINE

## 2023-01-21 PROCEDURE — 82375 ASSAY CARBOXYHB QUANT: CPT | Performed by: EMERGENCY MEDICINE

## 2023-01-21 PROCEDURE — 85025 COMPLETE CBC W/AUTO DIFF WBC: CPT | Performed by: INTERNAL MEDICINE

## 2023-01-21 PROCEDURE — 82962 GLUCOSE BLOOD TEST: CPT

## 2023-01-21 PROCEDURE — 94760 N-INVAS EAR/PLS OXIMETRY 1: CPT

## 2023-01-21 RX ORDER — PRAVASTATIN SODIUM 20 MG
20 TABLET ORAL NIGHTLY
Status: DISCONTINUED | OUTPATIENT
Start: 2023-01-21 | End: 2023-01-22 | Stop reason: HOSPADM

## 2023-01-21 RX ORDER — LOSARTAN POTASSIUM 50 MG/1
50 TABLET ORAL 2 TIMES DAILY
Status: DISCONTINUED | OUTPATIENT
Start: 2023-01-21 | End: 2023-01-22 | Stop reason: HOSPADM

## 2023-01-21 RX ORDER — AMLODIPINE BESYLATE 5 MG/1
5 TABLET ORAL DAILY
Status: DISCONTINUED | OUTPATIENT
Start: 2023-01-22 | End: 2023-01-22 | Stop reason: HOSPADM

## 2023-01-21 RX ORDER — ACETAMINOPHEN 160 MG/5ML
650 SOLUTION ORAL EVERY 4 HOURS PRN
Status: DISCONTINUED | OUTPATIENT
Start: 2023-01-21 | End: 2023-01-22 | Stop reason: HOSPADM

## 2023-01-21 RX ORDER — SODIUM CHLORIDE 0.9 % (FLUSH) 0.9 %
10 SYRINGE (ML) INJECTION AS NEEDED
Status: DISCONTINUED | OUTPATIENT
Start: 2023-01-21 | End: 2023-01-22 | Stop reason: HOSPADM

## 2023-01-21 RX ORDER — DEXTROSE MONOHYDRATE 25 G/50ML
25 INJECTION, SOLUTION INTRAVENOUS
Status: DISCONTINUED | OUTPATIENT
Start: 2023-01-21 | End: 2023-01-22 | Stop reason: HOSPADM

## 2023-01-21 RX ORDER — WARFARIN SODIUM 6 MG/1
6 TABLET ORAL
Status: DISCONTINUED | OUTPATIENT
Start: 2023-01-21 | End: 2023-01-22 | Stop reason: HOSPADM

## 2023-01-21 RX ORDER — METOPROLOL SUCCINATE 50 MG/1
100 TABLET, EXTENDED RELEASE ORAL DAILY
Status: DISCONTINUED | OUTPATIENT
Start: 2023-01-21 | End: 2023-01-21

## 2023-01-21 RX ORDER — SODIUM CHLORIDE 0.9 % (FLUSH) 0.9 %
10 SYRINGE (ML) INJECTION EVERY 12 HOURS SCHEDULED
Status: DISCONTINUED | OUTPATIENT
Start: 2023-01-21 | End: 2023-01-22 | Stop reason: HOSPADM

## 2023-01-21 RX ORDER — SODIUM CHLORIDE 9 MG/ML
40 INJECTION, SOLUTION INTRAVENOUS AS NEEDED
Status: DISCONTINUED | OUTPATIENT
Start: 2023-01-21 | End: 2023-01-22 | Stop reason: HOSPADM

## 2023-01-21 RX ORDER — ACETAMINOPHEN 325 MG/1
650 TABLET ORAL EVERY 4 HOURS PRN
Status: DISCONTINUED | OUTPATIENT
Start: 2023-01-21 | End: 2023-01-22 | Stop reason: HOSPADM

## 2023-01-21 RX ORDER — CARVEDILOL 12.5 MG/1
12.5 TABLET ORAL 2 TIMES DAILY WITH MEALS
Status: DISCONTINUED | OUTPATIENT
Start: 2023-01-22 | End: 2023-01-22 | Stop reason: HOSPADM

## 2023-01-21 RX ORDER — IPRATROPIUM BROMIDE AND ALBUTEROL SULFATE 2.5; .5 MG/3ML; MG/3ML
3 SOLUTION RESPIRATORY (INHALATION) EVERY 6 HOURS PRN
Status: DISCONTINUED | OUTPATIENT
Start: 2023-01-21 | End: 2023-01-22 | Stop reason: HOSPADM

## 2023-01-21 RX ORDER — LITHIUM CARBONATE 450 MG
450 TABLET, EXTENDED RELEASE ORAL 2 TIMES DAILY
Status: DISCONTINUED | OUTPATIENT
Start: 2023-01-21 | End: 2023-01-21

## 2023-01-21 RX ORDER — ACETAMINOPHEN 650 MG/1
650 SUPPOSITORY RECTAL EVERY 4 HOURS PRN
Status: DISCONTINUED | OUTPATIENT
Start: 2023-01-21 | End: 2023-01-22 | Stop reason: HOSPADM

## 2023-01-21 RX ORDER — ONDANSETRON 2 MG/ML
4 INJECTION INTRAMUSCULAR; INTRAVENOUS EVERY 6 HOURS PRN
Status: DISCONTINUED | OUTPATIENT
Start: 2023-01-21 | End: 2023-01-22 | Stop reason: HOSPADM

## 2023-01-21 RX ORDER — INSULIN LISPRO 100 [IU]/ML
2-7 INJECTION, SOLUTION INTRAVENOUS; SUBCUTANEOUS
Status: DISCONTINUED | OUTPATIENT
Start: 2023-01-21 | End: 2023-01-22 | Stop reason: HOSPADM

## 2023-01-21 RX ORDER — TAMSULOSIN HYDROCHLORIDE 0.4 MG/1
0.4 CAPSULE ORAL DAILY
Status: DISCONTINUED | OUTPATIENT
Start: 2023-01-21 | End: 2023-01-22 | Stop reason: HOSPADM

## 2023-01-21 RX ORDER — PRAMIPEXOLE DIHYDROCHLORIDE 0.5 MG/1
0.5 TABLET ORAL 2 TIMES DAILY
Status: DISCONTINUED | OUTPATIENT
Start: 2023-01-21 | End: 2023-01-22 | Stop reason: HOSPADM

## 2023-01-21 RX ORDER — AMLODIPINE BESYLATE 5 MG/1
10 TABLET ORAL DAILY
Status: DISCONTINUED | OUTPATIENT
Start: 2023-01-21 | End: 2023-01-21

## 2023-01-21 RX ORDER — NITROGLYCERIN 0.4 MG/1
0.4 TABLET SUBLINGUAL
Status: DISCONTINUED | OUTPATIENT
Start: 2023-01-21 | End: 2023-01-22 | Stop reason: HOSPADM

## 2023-01-21 RX ORDER — NICOTINE POLACRILEX 4 MG
15 LOZENGE BUCCAL
Status: DISCONTINUED | OUTPATIENT
Start: 2023-01-21 | End: 2023-01-22 | Stop reason: HOSPADM

## 2023-01-21 RX ORDER — RISPERIDONE 3 MG/1
3 TABLET ORAL NIGHTLY
Status: DISCONTINUED | OUTPATIENT
Start: 2023-01-21 | End: 2023-01-22 | Stop reason: HOSPADM

## 2023-01-21 RX ORDER — WARFARIN SODIUM 6 MG/1
6 TABLET ORAL
Status: COMPLETED | OUTPATIENT
Start: 2023-01-21 | End: 2023-01-21

## 2023-01-21 RX ADMIN — LITHIUM CARBONATE 450 MG: 450 TABLET ORAL at 08:44

## 2023-01-21 RX ADMIN — PRAMIPEXOLE DIHYDROCHLORIDE 0.5 MG: 0.5 TABLET ORAL at 20:51

## 2023-01-21 RX ADMIN — Medication 10 ML: at 08:44

## 2023-01-21 RX ADMIN — LOSARTAN POTASSIUM 50 MG: 50 TABLET, FILM COATED ORAL at 08:44

## 2023-01-21 RX ADMIN — TAMSULOSIN HYDROCHLORIDE 0.4 MG: 0.4 CAPSULE ORAL at 11:44

## 2023-01-21 RX ADMIN — LOSARTAN POTASSIUM 50 MG: 50 TABLET, FILM COATED ORAL at 20:51

## 2023-01-21 RX ADMIN — WARFARIN SODIUM 6 MG: 6 TABLET ORAL at 03:25

## 2023-01-21 RX ADMIN — PRAVASTATIN SODIUM 20 MG: 20 TABLET ORAL at 20:51

## 2023-01-21 RX ADMIN — AMLODIPINE BESYLATE 10 MG: 5 TABLET ORAL at 08:44

## 2023-01-21 RX ADMIN — METOPROLOL SUCCINATE 100 MG: 50 TABLET, EXTENDED RELEASE ORAL at 08:44

## 2023-01-21 RX ADMIN — WARFARIN SODIUM 6 MG: 6 TABLET ORAL at 17:25

## 2023-01-21 RX ADMIN — PRAMIPEXOLE DIHYDROCHLORIDE 0.5 MG: 0.5 TABLET ORAL at 11:44

## 2023-01-21 RX ADMIN — Medication 10 ML: at 20:51

## 2023-01-21 RX ADMIN — RISPERIDONE 3 MG: 3 TABLET ORAL at 20:51

## 2023-01-21 NOTE — PLAN OF CARE
Goal Outcome Evaluation:         Patient was an admit from ED on shift. Patient is A&O x4. VSS. No complaints from patient at this time. Will continue with the plan of care.

## 2023-01-21 NOTE — PLAN OF CARE
Goal Outcome Evaluation:                   Patient is alert and oriented x4.  Vital signs stable.  No complaints of pain this shift.  Weaning patient off oxygen.  Patient is currently at 100% on 1 L oxygen by nasal cannula.  Continuing plan of care.

## 2023-01-21 NOTE — H&P
[facility]  Date: 2023   Patient Name: Freedom Stevens  : 1965  MRN: [id number]  Primary Care Physician:  Isabella Barber MD  Date of admission: 2023    Subjective   Subjective     Chief Complaint: Confusion, shortness of breath, generalized weakness    HPI:    Freedom Stevens is a 57 y.o. male past medical history of CHF, hypertension that presents to the emergency department for evaluation of confusion, shortness of breath and generalized weakness that is gotten progressively worse over the past week.  He was altered earlier this evening which prompted him to be brought to the emergency department for further evaluation.  Upon arrival he was noted to be hypoxic and placed on 4 L nasal cannula.  It seems his mentation has improved with supplemental oxygen.  Sats were in the 80s prior to being placed on nasal cannula.  He denies any focal weakness but states he was generally weak to the point he was having trouble holding a phone.  He denies any other fevers, chills, sweats, nausea, vomiting, chest pain, palpitations, abdominal pain, diarrhea constipation, dysuria, rash.  Work-up in the emergency department included CT chest which did not show clear etiology of patient's hypoxia and was negative for PE but did show an incidental aortic aneurysm 4.7 cm.  Radiology recommends close interval follow-up.  Also noted pulmonary nodule and follow-up was recommended.  Lab work unrevealing.  Patient be admitted for ongoing monitoring and management.      Personal History     Past Medical History:  Past Medical History:   Diagnosis Date   • Chest pain    • CHF (congestive heart failure) (HCC)    • Heart disease    • High blood pressure    • Hx of blood diseases    • Kidney stone    • Polycystic kidney disease          Past Surgical History:  Past Surgical History:   Procedure Laterality Date   • AORTIC VALVE REPAIR/REPLACEMENT     • CARDIAC SURGERY     •  GALLBLADDER SURGERY           Family History:   Family History   Problem Relation Age of Onset   • Colon cancer Sister         40s   • Prostate cancer Brother         40s   • Colon cancer Brother         40s   • Colon cancer Other          Social History:   Social History     Tobacco Use   • Smoking status: Never   • Smokeless tobacco: Never   Vaping Use   • Vaping Use: Never used   Substance Use Topics   • Alcohol use: Yes     Alcohol/week: 2.0 standard drinks     Types: 2 Cans of beer per week   • Drug use: Never         Home Medications:  LORazepam, amLODIPine, hydroCHLOROthiazide, lithium, losartan, metFORMIN, metoprolol succinate XL, ondansetron ODT, pramipexole, pravastatin, risperiDONE, tamsulosin, temazepam, and warfarin    Allergies:  Allergies   Allergen Reactions   • Shellfish-Derived Products Unknown - Low Severity       Review of Systems   All systems were reviewed and negative except for: Confusion, shortness of breath, generalized weakness    Objective   Objective     Vitals:   Temp:  [98.1 °F (36.7 °C)-99 °F (37.2 °C)] 99 °F (37.2 °C)  Heart Rate:  [62-90] 62  Resp:  [20] 20  BP: (117-133)/(67-80) 124/72    Physical Exam    Constitutional: Awake, alert, no acute distress   Eyes: Pupils equal, sclerae anicteric, no conjunctival injection   HENT: NCAT, mucous membranes moist   Neck: Supple, no thyromegaly, no lymphadenopathy, trachea midline   Respiratory: Clear to auscultation bilaterally, nonlabored respirations    Cardiovascular: RRR, no murmurs, rubs, or gallops, palpable pedal pulses bilaterally   Gastrointestinal: Positive bowel sounds, soft, nontender, nondistended   Musculoskeletal: No bilateral ankle edema, no clubbing or cyanosis to extremities   Psychiatric: Appropriate affect, cooperative   Neurologic: Oriented x 3, strength symmetric in all extremities, Cranial Nerves grossly intact to confrontation, speech clear   Skin: No rashes     Result Review    Result Review:  I have personally  reviewed the results from the time of this admission to 1/21/2023 01:41 EST and agree with these findings:  [x]  Laboratory  []  Microbiology  [x]  Radiology  []  EKG/Telemetry   []  Cardiology/Vascular   []  Pathology  []  Old records  []  Other:      Assessment & Plan   Assessment / Plan     Assessment/Plan:   • Acute encephalopathy: Suspect multifactorial.  Likely related to patient's hypoxia but also suspect medications playing a role.  We will hold his benzodiazepine for now and consider adding back at reduced dose.  Supplemental oxygen as needed.  CT chest unrevealing as to cause of patient's hypoxia.  May need home O2 eval prior to discharge.  No significant wheeze on exam but will add as needed respiratory treatments.  • Aortic aneurysm: Close interval follow-up recommended per radiology.  • Pulmonary nodule: Outpatient follow-up  • History of aortic valve replacement: Continue Coumadin, pharmacy to dose.  Daily INR.  • History of congestive heart failure unknown EF: Appears euvolemic.  Resume home medications as appropriate.  Monitor volume status.      DVT prophylaxis:  Coumadin    CODE STATUS:    Code Status (Patient has no pulse and is not breathing): CPR (Attempt to Resuscitate)  Medical Interventions (Patient has pulse or is breathing): Full Support      Admission Status:  I believe this patient meets observation status.    Electronically signed by Harjinder Marcum Jr, MD, 01/21/23, 1:41 AM EST.

## 2023-01-21 NOTE — CASE MANAGEMENT/SOCIAL WORK
Discharge Planning Assessment   Radha     Patient Name: Freedom Stevens  MRN: [id number]  Today's Date: 1/21/2023    Admit Date: 1/20/2023     Discharge Needs Assessment     Row Name 01/21/23 1049       Living Environment    People in Home spouse    Name(s) of People in Home Pt lives in [place] with his wife, Maria Elena PEÑALOZA    Current Living Arrangements home    Primary Care Provided by self    Provides Primary Care For no one    Family Caregiver if Needed child(eric), adult;spouse    Quality of Family Relationships supportive;helpful;involved    Able to Return to Prior Arrangements yes       Resource/Environmental Concerns    Resource/Environmental Concerns none       Transition Planning    Patient/Family Anticipates Transition to home    Patient/Family Anticipated Services at Transition durable medical equipment    Transportation Anticipated family or friend will provide;car, drives self       Discharge Needs Assessment    Readmission Within the Last 30 Days no previous admission in last 30 days    Equipment Currently Used at Home none    Concerns to be Addressed basic needs;discharge planning    Anticipated Changes Related to Illness none    Equipment Needed After Discharge oxygen               Discharge Plan     Row Name 01/21/23 1050       Plan    Plan Pt admitted due to shortness of breath. COVID pending. SW placed call to room to assess needs, spoke with daughter in room. Pt lives at home with his wife who is normally independent in ADLs. Pt plans to return home at discharge and denies any rehab/HHC at this time. Pt currently on 4L 02, may need 6MWT at discharge. Agreeable to use [facility] if needed. No additional needs noted. Pharmacy/PCP confirmed.    Patient/Family in Agreement with Plan yes               Demographic Summary     Row Name 01/21/23 1048       General Information    Admission Type observation    Arrived From emergency department    Referral Source admission list    Reason for Consult discharge  planning    Preferred Language English       Contact Information    Permission Granted to Share Info With family/designee               Functional Status     Row Name 01/21/23 1049       Functional Status    Usual Activity Tolerance good    Current Activity Tolerance good       Functional Status, IADL    Medications independent    Meal Preparation independent    Housekeeping independent    Laundry independent    Shopping independent       Mental Status    General Appearance WDL WDL       Mental Status Summary    Recent Changes in Mental Status/Cognitive Functioning no changes               Psychosocial     Row Name 01/21/23 1049       Behavior WDL    Behavior WDL WDL       Emotion Mood WDL    Emotion/Mood/Affect WDL WDL       Speech WDL    Speech WDL WDL       Perceptual State WDL    Perceptual State WDL WDL       Thought Process WDL    Thought Process WDL WDL       Intellectual Performance WDL    Intellectual Performance WDL WDL       Coping/Stress    Major Change/Loss/Stressor none    Sources of Support adult child(eric);spouse    Techniques to [place] with Loss/Stress/Change not applicable    Reaction to Health Status accepting    Understanding of Condition and Treatment adequate understanding of medical condition       Developmental Stage (Eriksson's)    Developmental Stage Stage 7 (35-65 years/Middle Adulthood) Generativity vs. Stagnation            DANA Burton

## 2023-01-22 ENCOUNTER — READMISSION MANAGEMENT (OUTPATIENT)
Dept: CALL CENTER | Facility: HOSPITAL | Age: 58
End: 2023-01-22
Payer: COMMERCIAL

## 2023-01-22 VITALS
HEART RATE: 55 BPM | RESPIRATION RATE: 18 BRPM | SYSTOLIC BLOOD PRESSURE: 119 MMHG | HEIGHT: 71 IN | WEIGHT: 282.63 LBS | DIASTOLIC BLOOD PRESSURE: 60 MMHG | TEMPERATURE: 98.4 F | BODY MASS INDEX: 39.57 KG/M2 | OXYGEN SATURATION: 95 %

## 2023-01-22 LAB
ANION GAP SERPL CALCULATED.3IONS-SCNC: 4.8 MMOL/L (ref 5–15)
BUN SERPL-MCNC: 18 MG/DL (ref 6–20)
BUN/CREAT SERPL: 17.8 (ref 7–25)
CALCIUM SPEC-SCNC: 8.9 MG/DL (ref 8.6–10.5)
CHLORIDE SERPL-SCNC: 103 MMOL/L (ref 98–107)
CO2 SERPL-SCNC: 30.2 MMOL/L (ref 22–29)
CREAT SERPL-MCNC: 1.01 MG/DL (ref 0.76–1.27)
DEPRECATED RDW RBC AUTO: 43.8 FL (ref 37–54)
EGFRCR SERPLBLD CKD-EPI 2021: 86.2 ML/MIN/1.73
ERYTHROCYTE [DISTWIDTH] IN BLOOD BY AUTOMATED COUNT: 12.8 % (ref 12.3–15.4)
GLUCOSE BLDC GLUCOMTR-MCNC: 123 MG/DL (ref 70–99)
GLUCOSE BLDC GLUCOMTR-MCNC: 157 MG/DL (ref 70–99)
GLUCOSE SERPL-MCNC: 185 MG/DL (ref 65–99)
HCT VFR BLD AUTO: 40.3 % (ref 37.5–51)
HGB BLD-MCNC: 13.2 G/DL (ref 13–17.7)
INR PPP: 1.78 (ref 0.86–1.15)
MCH RBC QN AUTO: 30.4 PG (ref 26.6–33)
MCHC RBC AUTO-ENTMCNC: 32.8 G/DL (ref 31.5–35.7)
MCV RBC AUTO: 92.9 FL (ref 79–97)
PLATELET # BLD AUTO: 148 10*3/MM3 (ref 140–450)
PMV BLD AUTO: 10.4 FL (ref 6–12)
POTASSIUM SERPL-SCNC: 4.5 MMOL/L (ref 3.5–5.2)
PROTHROMBIN TIME: 21 SECONDS (ref 11.8–14.9)
RBC # BLD AUTO: 4.34 10*6/MM3 (ref 4.14–5.8)
SODIUM SERPL-SCNC: 138 MMOL/L (ref 136–145)
WBC NRBC COR # BLD: 4.98 10*3/MM3 (ref 3.4–10.8)

## 2023-01-22 PROCEDURE — 80048 BASIC METABOLIC PNL TOTAL CA: CPT | Performed by: INTERNAL MEDICINE

## 2023-01-22 PROCEDURE — 94799 UNLISTED PULMONARY SVC/PX: CPT

## 2023-01-22 PROCEDURE — 82962 GLUCOSE BLOOD TEST: CPT

## 2023-01-22 PROCEDURE — 85027 COMPLETE CBC AUTOMATED: CPT | Performed by: INTERNAL MEDICINE

## 2023-01-22 PROCEDURE — 63710000001 INSULIN LISPRO (HUMAN) PER 5 UNITS: Performed by: INTERNAL MEDICINE

## 2023-01-22 PROCEDURE — 85610 PROTHROMBIN TIME: CPT | Performed by: INTERNAL MEDICINE

## 2023-01-22 PROCEDURE — 99239 HOSP IP/OBS DSCHRG MGMT >30: CPT | Performed by: INTERNAL MEDICINE

## 2023-01-22 PROCEDURE — G0378 HOSPITAL OBSERVATION PER HR: HCPCS

## 2023-01-22 RX ORDER — HYDROCHLOROTHIAZIDE 50 MG/1
25 TABLET ORAL DAILY
Qty: 30 TABLET | Refills: 0 | Status: SHIPPED | OUTPATIENT
Start: 2023-01-22

## 2023-01-22 RX ORDER — CARVEDILOL 12.5 MG/1
12.5 TABLET ORAL 2 TIMES DAILY WITH MEALS
Qty: 60 TABLET | Refills: 0 | Status: SHIPPED | OUTPATIENT
Start: 2023-01-22 | End: 2023-02-21

## 2023-01-22 RX ORDER — LITHIUM CARBONATE 450 MG
450 TABLET, EXTENDED RELEASE ORAL EVERY 12 HOURS SCHEDULED
Status: DISCONTINUED | OUTPATIENT
Start: 2023-01-22 | End: 2023-01-22 | Stop reason: HOSPADM

## 2023-01-22 RX ADMIN — CARVEDILOL 12.5 MG: 12.5 TABLET, FILM COATED ORAL at 08:29

## 2023-01-22 RX ADMIN — INSULIN LISPRO 2 UNITS: 100 INJECTION, SOLUTION INTRAVENOUS; SUBCUTANEOUS at 11:43

## 2023-01-22 RX ADMIN — LOSARTAN POTASSIUM 50 MG: 50 TABLET, FILM COATED ORAL at 08:29

## 2023-01-22 RX ADMIN — LITHIUM CARBONATE 450 MG: 450 TABLET ORAL at 09:48

## 2023-01-22 RX ADMIN — Medication 10 ML: at 08:28

## 2023-01-22 RX ADMIN — TAMSULOSIN HYDROCHLORIDE 0.4 MG: 0.4 CAPSULE ORAL at 08:29

## 2023-01-22 RX ADMIN — PRAMIPEXOLE DIHYDROCHLORIDE 0.5 MG: 0.5 TABLET ORAL at 08:29

## 2023-01-22 RX ADMIN — AMLODIPINE BESYLATE 5 MG: 5 TABLET ORAL at 08:28

## 2023-01-22 NOTE — PLAN OF CARE
Goal Outcome Evaluation:      Patient is alert and oriented x4. Vital signs stable. Patient wore CPAP for a few hours on shift. No change in patient's status on shift. Will continue with the plan of care.

## 2023-01-22 NOTE — OUTREACH NOTE
Prep Survey    Flowsheet Row Responses   [facility] [place] patient discharged from? [facility]   Is LACE score < 7 ? Yes   Eligibility [facility]   Date of Admission 01/20/23   Date of Discharge 01/22/23   Discharge Disposition Home or Self Care   Discharge diagnosis Acute respiratory failure with hypoxia    Does the patient have one of the following disease processes/diagnoses(primary or secondary)? Other   Does the patient have Home health ordered? No   Is there a DME ordered? No   Prep survey completed? Yes          WARREN CANNON - Registered Nurse

## 2023-01-22 NOTE — PLAN OF CARE
Goal Outcome Evaluation:                   Patient is alert and oriented x4.  Vital signs stable.  Patient complained of pain this morning, but refused medication.  Walking oximetry completed this shift.  Patient to be discharged home with self-care.  Discharge instructions provided to patient and spouse.  Patient and spouse verbalized understanding of discharge instructions.  Patient advised to schedule follow-up appointments tomorrow, January 23 when offices reopen.  Patient agreeable to self-schedule follow-up appointments.  IV removed with tip intact.

## 2023-01-22 NOTE — DISCHARGE SUMMARY
[facility]         HOSPITALIST  DISCHARGE SUMMARY    Patient Name: Freedom Stevens    : 1965    MRN: [id number]    Date of Admission: 2023  Date of Discharge:  23  Primary Care Physician: Isabella Barber MD    Consults     Date and Time Order Name Status Description    2023  1:18 AM Inpatient Hospitalist Consult            Final Diagnosis:  Acute hypoxic respiratory failure SPO2 in the 80s on room air initially requiring 4 L nasal cannula  Question underlying chronic hypoxia  Pulmonary nodules, 6 to 12-month outpatient follow-up  Acute metabolic encephalopathy suspect secondary to hypoxic respiratory failure  4.7 cm ascending aortic aneurysm, history of aortic aneurysm, outpatient follow-up for monitoring  Mechanical aortic valve on warfarin follows with Dr. Tellez/cardiology  CHF unclear subtype  Polycystic kidney disease  Bipolar disorder on lithium  DORIAN not tolerant of NIPPV   Morbid obesity    Hospital Course     Hospital Course: 57-year-old male with history of CHF, bipolar, DORIAN not tolerant of CPAP who presented with weakness confusion and shortness of breath.  Initially was hypoxic requiring 4 L nasal cannula for O2 sats in the 80s.  Was admitted for further work-up and monitoring.    Initial toxicology screen negative, lithium level was not elevated, CT head negative for acute findings, CT PE negative for clot, no obvious infiltrate, calcified pulmonary nodules noted.  Curry last showed has been filled in November, also had temazepam listed as a home medication but his tox was negative for benzodiazepines.  Recommend avoiding this going forward.  We will also have him follow-up with a psychiatrist for additional monitoring for his bipolar disorder.    *Follow-up 6 to 12-month low-dose CT chest for lung nodules.  Patient non-smoker    Initial hypoxia in the 80s resolved by time of discharge.  Was able to wean off of oxygen, 6-minute walk test prior to  discharge was negative for any oxygen requirement.  Infectious studies were negative.  Suspect patient has a component of sleep apnea contributing to confusion episodes but was unable to tolerate CPAP and previous trials as an outpatient were unsuccessful.  Sleep study referral sent, may end up just needing nocturnal oxygen in the future if unable to tolerate NIPPV.    History of a sending aortic aneurysm, unclear previous size, 4.7 cm on imaging here, transition from metoprolol succinate to Coreg to decrease risk of further increase in size.  Patient is a non-smoker.  We will follow-up with his cardiologist for continued monitoring.      Patient discharged in stable condition with close PCP cardiology and psychiatry follow up.   Return precautions and follow up discussed and patient and his wife we discussed findings and plan with on the phone voiced agreement and understanding of treatment plan.     DISCHARGE Follow Up Recommendations for labs and diagnostics:   Follow-up sleep study and for nocturnal oxygen versus NIPPV  Follow-up INR, had apparently recently ran out of warfarin but did have a refill, subtherapeutic, goal 2-3 for his aortic valve  Follow-up with cardiology for aneurysm and carvedilol monitoring      CODE STATUS:  Code Status and Medical Interventions:   Ordered at: 01/21/23 0130     Code Status (Patient has no pulse and is not breathing):    CPR (Attempt to Resuscitate)     Medical Interventions (Patient has pulse or is breathing):    Full Support           Day of Discharge     Vital Signs:  Temp:  [97.3 °F (36.3 °C)-98.4 °F (36.9 °C)] 97.3 °F (36.3 °C)  Heart Rate:  [52-64] 60  Resp:  [16-20] 16  BP: (115-129)/(64-79) 125/79  Flow (L/min):  [1-4] 1    Physical Exam  Gen: awake, resting in bed, conversant, obese  HENT: NCAT, mmm  Resp: CTAB, normal respiratory effort on room air  CV: RRR, no LE pitting edema  GI: Abdomen soft, NT, ND, no guarding, +BS  Psych: appropriate mood and affect, aox3, no  confusion or hallucinations  Skin: warm, dry      Discharge Details        Discharge Medications      New Medications      Instructions Start Date   carvedilol 12.5 MG tablet  Commonly known as: COREG   12.5 mg, Oral, 2 Times Daily With Meals         Changes to Medications      Instructions Start Date   hydroCHLOROthiazide 50 MG tablet  Commonly known as: HYDRODIURIL  What changed: how much to take   25 mg, Oral, Daily         Continue These Medications      Instructions Start Date   amLODIPine 10 MG tablet  Commonly known as: NORVASC   Take 1 tablet by mouth once daily      lithium 450 MG CR tablet  Commonly known as: ESKALITH   450 mg, Oral, 2 Times Daily      losartan 25 MG tablet  Commonly known as: COZAAR   50 mg, Oral, 2 Times Daily      metFORMIN 500 MG tablet  Commonly known as: GLUCOPHAGE   500 mg, Oral, Daily      ondansetron ODT 4 MG disintegrating tablet  Commonly known as: Zofran ODT   4 mg, Translingual, Every 8 Hours PRN      pramipexole 0.5 MG tablet  Commonly known as: MIRAPEX   0.5 mg, Oral, 2 Times Daily      pravastatin 20 MG tablet  Commonly known as: PRAVACHOL   20 mg, Oral, Every Night at Bedtime      risperiDONE 3 MG tablet  Commonly known as: risperDAL   3 mg, Oral, Every Night at Bedtime      tamsulosin 0.4 MG capsule 24 hr capsule  Commonly known as: FLOMAX   0.4 mg, Oral, Daily      warfarin 1 MG tablet  Commonly known as: COUMADIN   1 mg, Oral, Nightly      warfarin 5 MG tablet  Commonly known as: COUMADIN   TAKE 1 TABLET BY MOUTH ONCE DAILY AT NIGHT         Stop These Medications    LORazepam 1 MG tablet  Commonly known as: ATIVAN     metoprolol succinate  MG 24 hr tablet  Commonly known as: TOPROL-XL     temazepam 30 MG capsule  Commonly known as: RESTORIL              Discharge Disposition:  Home or Self Care    Diet: patient counseled on dietary changes made during hospital and plans to  advance as tolerated     Discharge Activity: advance as tolerated    Future Appointments    Date Time Provider Department Center   1/26/2023 10:15 AM NURSE/MA PC [facility]   4/28/2023  8:00 AM Isabella Barber MD [facility]   10/11/2023  2:30 PM Ella Agrawal MD [facility]       Additional Instructions for the Follow-ups that You Need to Schedule     Ambulatory Referral to Sleep Medicine   As directed      Follow-up needed: Yes         Discharge Follow-up with PCP   As directed       Currently Documented PCP:    Isabella Barber MD    PCP Phone Number:    [phone]     Follow Up Details: 3-5 days f/u BP, INR, and sleep apnea         Discharge Follow-up with Specified Provider: 1-2 weeks with outpt psychiatrist to f/u bipolar disorder/medications   As directed      To: 1-2 weeks with outpt psychiatrist to f/u bipolar disorder/medications         Discharge Follow-up with Specified Provider: 2-4 weeks - cardiology/dr french - f/u 4.7cm ascending aorta aneurysm   As directed      To: 2-4 weeks - cardiology/dr french - f/u 4.7cm ascending aorta aneurysm               Pertinent  and/or Most Recent Results       LAB RESULTS:      Lab 01/22/23  0420 01/21/23  0516 01/20/23  1731   WBC 4.98 4.23 5.76   HEMOGLOBIN 13.2 13.5 13.7   HEMATOCRIT 40.3 41.1 41.2   PLATELETS 148 151 161   NEUTROS ABS  --  2.50 3.58   IMMATURE GRANS (ABS)  --  0.07* 0.06*   LYMPHS ABS  --  0.87 1.19   MONOS ABS  --  0.54 0.66   EOS ABS  --  0.20 0.22   MCV 92.9 91.5 92.2   PROTIME 21.0* 20.5* 23.0*         Lab 01/22/23  0420 01/21/23  0516 01/20/23  1731   SODIUM 138 136 136   POTASSIUM 4.5 4.1 4.4   CHLORIDE 103 99 98   CO2 30.2* 27.0 29.4*   ANION GAP 4.8* 10.0 8.6   BUN 18 16 20   CREATININE 1.01 1.07 1.07   EGFR 86.2 80.4 80.4   GLUCOSE 185* 217* 137*   CALCIUM 8.9 9.3 9.6   MAGNESIUM  --   --  1.7         Lab 01/21/23  0516 01/20/23  1731   TOTAL PROTEIN 6.9 7.4   ALBUMIN 4.2 4.4   GLOBULIN 2.7 3.0   ALT (SGPT) 27 26   AST (SGOT) 37 33   BILIRUBIN 0.5 0.5   ALK PHOS 59 68          Lab 01/22/23  0420 01/21/23  0516 01/20/23  1731 01/20/23  0000   PROBNP  --   --  177.6  --    TROPONIN T  --   --  <0.010  --    PROTIME 21.0* 20.5* 23.0*  --    INR 1.78* 1.73* 1.99* 2.20*                 Lab 01/21/23  0141   PH, ARTERIAL 7.374   PCO2, ARTERIAL 48.0*   PO2 ART 61.4*   O2 SATURATION ART 91.0*   FIO2 36   HCO3 ART 27.4*   BASE EXCESS ART 1.5   CARBOXYHEMOGLOBIN 1.4     Brief Urine Lab Results  (Last result in the past 365 days)      Color   Clarity   Blood   Leuk Est   Nitrite   Protein   CREAT   Urine HCG        01/20/23 1834 Yellow   Clear   Negative   Negative   Negative   Trace               Microbiology Results (last 10 days)     Procedure Component Value - Date/Time    Influenza Antigen, Rapid - Swab, Nasopharynx [[id number]]  (Normal) Collected: 01/21/23 1039    Lab Status: Final result Specimen: Swab from Nasopharynx Updated: 01/21/23 1114     Influenza A Ag, EIA Negative     Influenza B Ag, EIA Negative    RSV Screen - Swab, Nasopharynx [[id number]]  (Normal) Collected: 01/21/23 1039    Lab Status: Final result Specimen: Swab from Nasopharynx Updated: 01/21/23 1114     RSV Rapid Ag Negative    COVID-19,APTIMA PANTHER(YUE),[facility], NP/OP SWAB IN UTM/VTM/SALINE TRANSPORT MEDIA,24 HR TAT - Swab, Nasal Cavity [[id number]]  (Normal) Collected: 01/20/23 1728    Lab Status: Final result Specimen: Swab from Nasal Cavity Updated: 01/20/23 2200     COVID19 Not Detected    Narrative:      Fact sheet for providers: https://www.fda.gov/media/771358/download     Fact sheet for patients: https://www.fda.gov/media/195007/download    Test performed by RT PCR.          RADIOLOGY:  CT Head Without Contrast    Result Date: 1/20/2023  Impression:  No acute intracranial abnormality.     NICOLE MONCADA DO       Electronically Signed and Approved By: NICOLE MONCADA DO on 1/20/2023 at 18:12             CT Chest With Contrast Diagnostic    Result Date: 1/21/2023  Impression:   1. No pulmonary embolism.   2. No acute infiltrate.  3. Fusiform aneurysmal dilatation involves the ascending aorta with maximum diameter 4.7 cm.  Consider close interval clinical and imaging follow-up this finding to ensure a benign progression.  No thoracic aortic dissection is suggested.  4. There are suspected minimal coronary artery calcifications.  5. There are tiny noncalcified pulmonary nodules bilaterally, which are somewhat obscured by motion artifact.  They measure 6 mm or less in size.  Consider low-dose chest CT (LDCT) examination follow-up (such as in 6-12 months) if clinically warranted and if not already being performed elsewhere.  6. The patient has undergone median sternotomy.  There is an aortic valve prosthesis in place, seen previously.  7. Please see above comments for further detail.   1.  Please note that portions of this note were completed with a voice recognition program.  JENNIE MONTIEL JR, MD       Electronically Signed and Approved By: JENNIE MONTIEL JR, MD on 1/21/2023 at 0:56             XR Chest 1 View    Result Date: 1/20/2023  Impression:  Borderline prominent cardiac silhouette.  Sternotomy wires are present.  There are no prior studies for comparison at this institution.  No acute cardiopulmonary process.       NICOLE MONCADA DO       Electronically Signed and Approved By: NICOLE MONCADA DO on 1/20/2023 at 18:40                           Labs Pending at Discharge:        Time spent on Discharge including face to face service:  40 minutes

## 2023-01-22 NOTE — NURSING NOTE
Exercise Oximetry    Patient Name:Freedom Stevens   MRN: [id number]   Date: 01/22/23             ROOM AIR BASELINE   SpO2% 97%   Heart Rate 71   Blood Pressure 125/79     EXERCISE ON ROOM AIR SpO2% EXERCISE ON O2 @  LPM SpO2%   1 MINUTE 97% 1 MINUTE    2 MINUTES 94% 2 MINUTES    3 MINUTES 96% 3 MINUTES    4 MINUTES 97% 4 MINUTES    5 MINUTES 96% 5 MINUTES    6 MINUTES 97% 6 MINUTES               Distance Walked  200 feet Distance Walked   Dyspnea (Iam Scale)   Dyspnea (Iam Scale)   Fatigue (Iam Scale) Fatigue (Iam Scale)   SpO2% Post Exercise  97% SpO2% Post Exercise   HR Post Exercise  76 HR Post Exercise   Time to Recovery  0 seconds Time to Recovery     Comments:

## 2023-01-23 ENCOUNTER — TRANSITIONAL CARE MANAGEMENT TELEPHONE ENCOUNTER (OUTPATIENT)
Dept: CALL CENTER | Facility: HOSPITAL | Age: 58
End: 2023-01-23
Payer: COMMERCIAL

## 2023-01-23 ENCOUNTER — TELEPHONE (OUTPATIENT)
Dept: FAMILY MEDICINE CLINIC | Age: 58
End: 2023-01-23

## 2023-01-23 NOTE — TELEPHONE ENCOUNTER
Caller: IQRA AWAD    Relationship: Emergency Contact    Best call back number: [phone]    Who are you requesting to speak with (clinical staff, provider,  specific staff member): CLINICAL    What was the call regarding: PATIENTS WIFE STATES THE PATIENT HAS AN ANEURYSM ON HIS AORTA. PATIENT WIFE STATES SHE WOULD LIKE TO KNOW IF DR PLASENCIA WOULD LIKE TO SEE THE PATIENT BEFORE HIS UPCOMING APPOINTMENT REGARDING HIS OXYGEN.    Do you require a callback: YES

## 2023-01-23 NOTE — OUTREACH NOTE
Call Center TCM Note    Flowsheet Row Responses   [facility] patient discharged from? [facility]   Does the patient have one of the following disease processes/diagnoses(primary or secondary)? Other   TCM attempt successful? Yes  [verbal release ]   Call start time 1057   Call end time 1101   Discharge diagnosis Acute respiratory failure with hypoxia    Medication alerts for this patient WARFARIN---next INR on 23   Meds reviewed with patient/caregiver? Yes   Is the patient having any side effects they believe may be caused by any medication additions or changes? No   Does the patient have all medications ordered at discharge? Yes   Is the patient taking all medications as directed (includes completed medication regime)? Yes   Comments PCP FOLLOW UP APPOINTMENT IS 23@415pm   Does the patient have an appointment with their PCP within 7 days of discharge? Yes   Has home health visited the patient within 72 hours of discharge? N/A   Psychosocial issues? No   Did the patient receive a copy of their discharge instructions? Yes   Nursing interventions Reviewed instructions with patient   What is the patient's perception of their health status since discharge? Improving  [Pt reports he is doing alot better--reports he could get OOB by himself, watching sats and have been 93-95%.  Encouraged to weigh daily, notify MD of 2-3# gain in a day or 5# in a week.  ]   Is the patient/caregiver able to teach back signs and symptoms related to disease process for when to call PCP? Yes   Is the patient/caregiver able to teach back signs and symptoms related to disease process for when to call 911? Yes   TCM call completed? Yes   Call end time 1101          Ana Gracia RN    2023, 11:07 EST

## 2023-01-27 ENCOUNTER — OFFICE VISIT (OUTPATIENT)
Dept: FAMILY MEDICINE CLINIC | Age: 58
End: 2023-01-27
Payer: COMMERCIAL

## 2023-01-27 VITALS
WEIGHT: 283 LBS | OXYGEN SATURATION: 93 % | HEIGHT: 71 IN | DIASTOLIC BLOOD PRESSURE: 78 MMHG | SYSTOLIC BLOOD PRESSURE: 114 MMHG | BODY MASS INDEX: 39.62 KG/M2 | HEART RATE: 85 BPM

## 2023-01-27 DIAGNOSIS — Z95.2 HX OF AORTIC VALVE REPLACEMENT, MECHANICAL: ICD-10-CM

## 2023-01-27 DIAGNOSIS — J96.01 ACUTE RESPIRATORY FAILURE WITH HYPOXIA: Primary | ICD-10-CM

## 2023-01-27 DIAGNOSIS — I71.21 ANEURYSM OF ASCENDING AORTA WITHOUT RUPTURE: ICD-10-CM

## 2023-01-27 DIAGNOSIS — G47.33 OBSTRUCTIVE SLEEP APNEA SYNDROME: ICD-10-CM

## 2023-01-27 LAB — INR PPP: 2 (ref 2.5–3.5)

## 2023-01-27 PROCEDURE — 99495 TRANSJ CARE MGMT MOD F2F 14D: CPT | Performed by: FAMILY MEDICINE

## 2023-01-27 PROCEDURE — 36416 COLLJ CAPILLARY BLOOD SPEC: CPT | Performed by: FAMILY MEDICINE

## 2023-01-27 PROCEDURE — 85610 PROTHROMBIN TIME: CPT | Performed by: FAMILY MEDICINE

## 2023-01-27 NOTE — ASSESSMENT & PLAN NOTE
He did have newly identified fusiform 4.7 cm ascending aortic aneurysm identified on the CT chest obtained during this past admission.  He has already notify Dr. Tellez's office and they have taken steps to refer him to CT Surgery.  He has an appointment with them next week for evaluation.

## 2023-01-27 NOTE — ASSESSMENT & PLAN NOTE
Unclear what led to his episode of acute hypoxic respiratory failure and altered mental status last week.  He had a very thorough work-up done in the hospital, none of which was terribly revealing.  It is true that he has sleep apnea which is completely untreated due to inability to tolerate CPAP.  He needs a referral placed to Sleep Medicine for further evaluation and treatment of this issue.  Hospital Medicine suggested that perhaps he could benefit from nocturnal oxygen even if he is unable to tolerate the CPAP.  Referral was originally placed for [facility] but they prefer to do this in [place], so I have reentered that referral for [facility].  Otherwise, we will just continue to monitor closely.  His wife Maria Elena has a pulse oximeter that she purchased so she will continue to check his O2 regularly.  I am going to see him back in 4 weeks for close follow-up.  He did have some adjustments made to his bipolar regimen.  He follows with Dr. Mccauley Psychiatry who manages his medications, so I will defer further changes to the regimen back to his specialist.  He is, however, off of the benzodiazepine at this time.

## 2023-01-27 NOTE — PROGRESS NOTES
"Transitional Care Follow Up Visit  Subjective     Freedom Stevens is a 57 y.o. male who presents for a transitional care management visit.  His wife Maria Elena accompanies him today.    Within 48 business hours after discharge our office contacted him via telephone to coordinate his care and needs.      I reviewed and discussed the details of that call along with the discharge summary, hospital problems, inpatient lab results, inpatient diagnostic studies, and consultation reports with Freedom.     Current outpatient and discharge medications have been reconciled for the patient.  Reviewed by: Isabella Barber MD      Date of TCM Phone Call 1/22/2023   [facility]   Date of Admission 1/20/2023   Date of Discharge 1/22/2023   Discharge Disposition Home or Self Care     Risk for Readmission (LACE) Score: 4 (1/22/2023  6:00 AM)      History of Present Illness   Course During Hospital Stay: Toro is here today for NESTOR appointment after being admitted to [facility] from 1/20/2023 to 1/22/2023.  He arrived to clinic here as a walk-in with noted confusion and O2 sat 90% on room air.  Following evaluation, he was advised to proceed to the ED in [facility].  His initial tox screen was negative and lithium level was normal.  He had a head CT that was negative acutely and CT PE protocol that was negative for PE or infiltrate.  When he arrived in the ED, he was satting in the 80s but was able to wean off the oxygen during the course of his admission.  He does have a diagnosis of sleep apnea but does not use CPAP at home as he is unable to tolerate it. \"It felt like it would choke me out.\"   He was discharged on lithium and risperidone alone for his bipolar disorder.  Previously he was following with Dr. Mccauley Psychiatry.  It looks like they were going to set him up with a new psychiatrist on discharge.    He was diagnosed with a new 4.7 cm fusiform ascending aortic aneurysm.      Referral to " "Sleep Medicine was recommended to see if he might qualify for nocturnal oxygen since he was unable to tolerate CPAP.    He does follow with Dr. Tellez Cardiology and was last seen there November 2022.  He has a history of a sending aortic aneurysm repair and aortic valve replacement.     The following portions of the patient's history were reviewed and updated as appropriate: allergies, current medications, past family history, past medical history, past social history, past surgical history and problem list.    Review of Systems   Constitutional: Negative for chills, fatigue and fever.   HENT: Negative for congestion, hearing loss and rhinorrhea.    Eyes: Negative for pain and visual disturbance.   Respiratory: Negative for cough and shortness of breath.    Cardiovascular: Negative for chest pain and palpitations.   Gastrointestinal: Negative for abdominal pain, constipation, diarrhea, nausea and vomiting.   Musculoskeletal: Negative for arthralgias and myalgias.       Objective    Vitals:    01/27/23 1552   BP: 114/78   BP Location: Right arm   Patient Position: Sitting   Pulse: 85   SpO2: 93%  Comment: room air   Weight: 128 kg (283 lb)   Height: 180.3 cm (70.98\")       Physical Exam  Vitals reviewed.   Constitutional:       General: He is not in acute distress.     Appearance: Normal appearance. He is well-developed.   HENT:      Head: Normocephalic and atraumatic.      Right Ear: External ear normal.      Left Ear: External ear normal.   Eyes:      Extraocular Movements: Extraocular movements intact.      Conjunctiva/sclera: Conjunctivae normal.      Pupils: Pupils are equal, round, and reactive to light.   Cardiovascular:      Rate and Rhythm: Normal rate and regular rhythm.      Heart sounds: No murmur heard.  Pulmonary:      Effort: Pulmonary effort is normal.      Breath sounds: Normal breath sounds. No wheezing, rhonchi or rales.   Abdominal:      General: Bowel sounds are normal. There is no " distension.      Palpations: Abdomen is soft.      Tenderness: There is no abdominal tenderness.   Musculoskeletal:         General: Normal range of motion.   Neurological:      Mental Status: He is alert.   Psychiatric:         Mood and Affect: Affect normal.         Assessment & Plan   Diagnoses and all orders for this visit:    1. Acute respiratory failure with hypoxia (HCC) (Primary)  Assessment & Plan:  Unclear what led to his episode of acute hypoxic respiratory failure and altered mental status last week.  He had a very thorough work-up done in the hospital, none of which was terribly revealing.  It is true that he has sleep apnea which is completely untreated due to inability to tolerate CPAP.  He needs a referral placed to Sleep Medicine for further evaluation and treatment of this issue.  Hospital Medicine suggested that perhaps he could benefit from nocturnal oxygen even if he is unable to tolerate the CPAP.  Referral was originally placed for [facility] but they prefer to do this in [place], so [facility]  Otherwise, we will just continue to monitor closely.  His wife Maria Elena has a pulse oximeter that she purchased so she will continue to check his O2 regularly.  I am going to see him back in 4 weeks for close follow-up.  He did have some adjustments made to his bipolar regimen.  He follows with Dr. Mccauley Psychiatry who manages his medications, so I will defer further changes to the regimen back to his specialist.  He is, however, off of the benzodiazepine at this time.      2. Hx of aortic valve replacement, mechanical  Assessment & Plan:  On warfarin.    Orders:  -     POC INR    3. Obstructive sleep apnea syndrome  Assessment & Plan:  Referral placed as per hypoxic respiratory failure plan.    Orders:  -     Ambulatory Referral to Sleep Medicine    4. Aneurysm of ascending aorta without rupture  Overview:  Following with Dr. Tellez Cardiology at [facility].  Status  post repair 2015.    Assessment & Plan:  He did have newly identified fusiform 4.7 cm ascending aortic aneurysm identified on the CT chest obtained during this past admission.  He has already notify Dr. Tellez's office and they have taken steps to refer him to CT Surgery.  He has an appointment with them next week for evaluation.

## 2023-01-30 LAB — QT INTERVAL: 389 MS

## 2023-02-10 ENCOUNTER — ANTICOAGULATION VISIT (OUTPATIENT)
Dept: FAMILY MEDICINE CLINIC | Age: 58
End: 2023-02-10
Payer: COMMERCIAL

## 2023-02-10 VITALS — SYSTOLIC BLOOD PRESSURE: 128 MMHG | DIASTOLIC BLOOD PRESSURE: 80 MMHG | HEART RATE: 81 BPM

## 2023-02-10 DIAGNOSIS — Z95.2 HX OF AORTIC VALVE REPLACEMENT, MECHANICAL: Primary | ICD-10-CM

## 2023-02-10 LAB — INR PPP: 3.3 (ref 2.5–3.5)

## 2023-02-10 PROCEDURE — 36416 COLLJ CAPILLARY BLOOD SPEC: CPT | Performed by: FAMILY MEDICINE

## 2023-02-10 PROCEDURE — 93793 ANTICOAG MGMT PT WARFARIN: CPT | Performed by: FAMILY MEDICINE

## 2023-02-10 PROCEDURE — 85610 PROTHROMBIN TIME: CPT | Performed by: FAMILY MEDICINE

## 2023-02-15 ENCOUNTER — TELEPHONE (OUTPATIENT)
Dept: FAMILY MEDICINE CLINIC | Age: 58
End: 2023-02-15

## 2023-02-15 NOTE — TELEPHONE ENCOUNTER
PER KIN @ [facility] SLEEP [facility] SHE WAS WAITING ON A AUTHORIZATION. LVM W/ PATIENT W. THEIR OFFICE PHONE # TO CALL FOR SCHEDULING.

## 2023-02-17 DIAGNOSIS — I10 ESSENTIAL HYPERTENSION: ICD-10-CM

## 2023-02-20 RX ORDER — LOSARTAN POTASSIUM 25 MG/1
50 TABLET ORAL 2 TIMES DAILY
Qty: 360 TABLET | Refills: 0 | Status: SHIPPED | OUTPATIENT
Start: 2023-02-20 | End: 2023-04-03

## 2023-02-28 ENCOUNTER — OFFICE VISIT (OUTPATIENT)
Dept: FAMILY MEDICINE CLINIC | Age: 58
End: 2023-02-28
Payer: COMMERCIAL

## 2023-02-28 VITALS
SYSTOLIC BLOOD PRESSURE: 132 MMHG | HEIGHT: 71 IN | BODY MASS INDEX: 40.71 KG/M2 | DIASTOLIC BLOOD PRESSURE: 80 MMHG | HEART RATE: 105 BPM | WEIGHT: 290.8 LBS | TEMPERATURE: 97.6 F

## 2023-02-28 DIAGNOSIS — Z95.2 HX OF AORTIC VALVE REPLACEMENT, MECHANICAL: Primary | ICD-10-CM

## 2023-02-28 DIAGNOSIS — G47.33 OBSTRUCTIVE SLEEP APNEA SYNDROME: ICD-10-CM

## 2023-02-28 DIAGNOSIS — I71.21 ANEURYSM OF ASCENDING AORTA WITHOUT RUPTURE: ICD-10-CM

## 2023-02-28 DIAGNOSIS — J96.01 ACUTE RESPIRATORY FAILURE WITH HYPOXIA: ICD-10-CM

## 2023-02-28 LAB — INR PPP: 2.3 (ref 2.5–3.5)

## 2023-02-28 PROCEDURE — 36416 COLLJ CAPILLARY BLOOD SPEC: CPT | Performed by: FAMILY MEDICINE

## 2023-02-28 PROCEDURE — 99214 OFFICE O/P EST MOD 30 MIN: CPT | Performed by: FAMILY MEDICINE

## 2023-02-28 PROCEDURE — 85610 PROTHROMBIN TIME: CPT | Performed by: FAMILY MEDICINE

## 2023-02-28 NOTE — PROGRESS NOTES
Chief Complaint  Respiratory Distress (4 week follow up )    Subjective          Freedom Stevens presents to [facility] today for follow-up after he was admitted to the hospital about a month ago with altered mental status secondary to acute respiratory failure presumably.    He was admitted to [facility] from 1/20/2023 to 1/22/2023 after arriving to clinic here as a walk-in with noted confusion and O2 sat 90% on room air.  Following evaluation, he was advised to proceed to the ED in [facility].  He had tox screen and lithium level which were unremarkable.  Head CT and CT PE protocol were negative for acute findings.  On arrival to the ED, he was satting in the 80s but was able to wean off the oxygen during the course of his admission.  He does have a diagnosis of sleep apnea but does not use CPAP at home as he is unable to tolerate it.  He has an appointment scheduled Dr. Dc 3/6/2023.      He was diagnosed with a new 4.7 cm fusiform ascending aortic aneurysm.  He does follow with Dr. Tellez Cardiology.  They have referred him to CT Surgery for further evaluation.  He was told that he was not yet an operative candidate and to come back in a year.  He already has a history of a sending aortic aneurysm repair and aortic valve replacement.    He is following with Dr. Mccauley.  Saw him last night.  Risperidone is down to 2 mg QHS.  He continues on pramipexole and lithium.        Current Outpatient Medications:   •  amLODIPine (NORVASC) 10 MG tablet, Take 1 tablet by mouth once daily (Patient taking differently: Take 1 tablet by mouth Daily.), Disp: 90 tablet, Rfl: 1  •  hydroCHLOROthiazide (HYDRODIURIL) 50 MG tablet, Take 0.5 tablets by mouth Daily., Disp: 30 tablet, Rfl: 0  •  lithium (ESKALITH) 450 MG CR tablet, Take 1 tablet by mouth 2 (Two) Times a Day., Disp: , Rfl:   •  losartan (COZAAR) 25 MG tablet, Take 2 tablets by mouth 2 (Two) Times a Day., Disp: 360  "tablet, Rfl: 0  •  metFORMIN (GLUCOPHAGE) 500 MG tablet, Take 1 tablet by mouth Daily., Disp: 90 tablet, Rfl: 1  •  ondansetron ODT (Zofran ODT) 4 MG disintegrating tablet, Place 1 tablet on the tongue Every 8 (Eight) Hours As Needed for Nausea or Vomiting., Disp: 20 tablet, Rfl: 0  •  pramipexole (MIRAPEX) 0.5 MG tablet, Take 1 tablet by mouth 2 (Two) Times a Day., Disp: , Rfl:   •  pravastatin (PRAVACHOL) 20 MG tablet, Take 1 tablet by mouth every night at bedtime., Disp: 90 tablet, Rfl: 1  •  risperiDONE (risperDAL) 3 MG tablet, Take 2 mg by mouth every night at bedtime., Disp: , Rfl:   •  tamsulosin (FLOMAX) 0.4 MG capsule 24 hr capsule, Take 1 capsule by mouth Daily for 360 days., Disp: 90 capsule, Rfl: 3  •  warfarin (COUMADIN) 1 MG tablet, Take 1 tablet by mouth Every Night., Disp: 90 tablet, Rfl: 1  •  warfarin (COUMADIN) 5 MG tablet, TAKE 1 TABLET BY MOUTH ONCE DAILY AT NIGHT (Patient taking differently: Take 1 tablet by mouth Every Night.), Disp: 90 tablet, Rfl: 1  •  carvedilol (COREG) 12.5 MG tablet, Take 1 tablet by mouth 2 (Two) Times a Day With Meals for 30 days., Disp: 60 tablet, Rfl: 0    Allergies:  Shellfish-derived products      Objective   Vital Signs:   Vitals:    02/28/23 1308   BP: 132/80   BP Location: Left arm   Patient Position: Sitting   Pulse: 105   Temp: 97.6 °F (36.4 °C)   TempSrc: Oral   Weight: 132 kg (290 lb 12.8 oz)   Height: 180.3 cm (70.98\")       Physical Exam  Vitals reviewed.   Constitutional:       General: He is not in acute distress.     Appearance: Normal appearance. He is well-developed.   HENT:      Head: Normocephalic and atraumatic.      Right Ear: External ear normal.      Left Ear: External ear normal.   Eyes:      Extraocular Movements: Extraocular movements intact.      Conjunctiva/sclera: Conjunctivae normal.      Pupils: Pupils are equal, round, and reactive to light.   Cardiovascular:      Rate and Rhythm: Normal rate and regular rhythm.      Heart sounds: No " murmur heard.  Pulmonary:      Effort: Pulmonary effort is normal.      Breath sounds: Normal breath sounds. No wheezing, rhonchi or rales.   Abdominal:      General: Bowel sounds are normal. There is no distension.      Palpations: Abdomen is soft.      Tenderness: There is no abdominal tenderness.   Musculoskeletal:         General: Normal range of motion.   Neurological:      Mental Status: He is alert.   Psychiatric:         Mood and Affect: Affect normal.          Lab Results   Component Value Date    GLUCOSE 185 (H) 01/22/2023    BUN 18 01/22/2023    CREATININE 1.01 01/22/2023    EGFRIFNONA 70 12/07/2021    EGFRIFAFRI 92 01/08/2018    BCR 17.8 01/22/2023    K 4.5 01/22/2023    CO2 30.2 (H) 01/22/2023    CALCIUM 8.9 01/22/2023    ALBUMIN 4.2 01/21/2023    LABIL2 1.5 03/11/2021    AST 37 01/21/2023    ALT 27 01/21/2023       Lab Results   Component Value Date    CHOL 121 09/13/2022    CHLPL 125 03/11/2021    TRIG 182 (H) 09/13/2022    HDL 35 (L) 09/13/2022    LDL 56 09/13/2022            Assessment and Plan    Diagnoses and all orders for this visit:    1. Hx of aortic valve replacement, mechanical (Primary)  -     POC INR    2. Acute respiratory failure with hypoxia (HCC)  Assessment & Plan:  No recurrent episodes since he was hospitalized a week ago.  Our working hypothesis is that his untreated sleep apnea led to the spell of hypoxic respiratory failure.  He was also noted to have some mild hypercapnia on his initial CMP.  He goes to see Dr. Dc 3/6/2023 for further evaluation.  We are hoping they will be able to find a CPAP mask which she can tolerate.      3. Obstructive sleep apnea syndrome  Assessment & Plan:  As per respiratory failure plan.      4. Aneurysm of ascending aorta without rupture  Overview:  He was evaluated by CT Surgery who recommended routine follow-up in 1 year.  They over read the CT report as a fusiform ascending aortic aneurysm of 4.5 as opposed to 4.7 cm.        Follow Up    Return if symptoms worsen or fail to improve, for or as scheduled 4/28/23.  Patient was given instructions and counseling regarding his condition or for health maintenance advice. Please see specific information pulled into the AVS if appropriate.           02/28/2023

## 2023-02-28 NOTE — ASSESSMENT & PLAN NOTE
No recurrent episodes since he was hospitalized a week ago.  Our working hypothesis is that his untreated sleep apnea led to the spell of hypoxic respiratory failure.  He was also noted to have some mild hypercapnia on his initial CMP.  He goes to see Dr. Dc 3/6/2023 for further evaluation.  We are hoping they will be able to find a CPAP mask which she can tolerate.

## 2023-03-07 ENCOUNTER — ANTICOAGULATION VISIT (OUTPATIENT)
Dept: FAMILY MEDICINE CLINIC | Age: 58
End: 2023-03-07
Payer: COMMERCIAL

## 2023-03-07 VITALS — HEART RATE: 90 BPM | DIASTOLIC BLOOD PRESSURE: 78 MMHG | SYSTOLIC BLOOD PRESSURE: 115 MMHG

## 2023-03-07 DIAGNOSIS — Z95.2 HX OF AORTIC VALVE REPLACEMENT, MECHANICAL: Primary | ICD-10-CM

## 2023-03-07 LAB — INR PPP: 1.9 (ref 2.5–3.5)

## 2023-03-07 PROCEDURE — 93793 ANTICOAG MGMT PT WARFARIN: CPT | Performed by: FAMILY MEDICINE

## 2023-03-07 PROCEDURE — 36416 COLLJ CAPILLARY BLOOD SPEC: CPT | Performed by: FAMILY MEDICINE

## 2023-03-07 PROCEDURE — 85610 PROTHROMBIN TIME: CPT | Performed by: FAMILY MEDICINE

## 2023-03-08 DIAGNOSIS — R11.0 NAUSEA: ICD-10-CM

## 2023-03-08 RX ORDER — ONDANSETRON 4 MG/1
TABLET, ORALLY DISINTEGRATING ORAL
Qty: 20 TABLET | Refills: 0 | Status: SHIPPED | OUTPATIENT
Start: 2023-03-08 | End: 2023-03-10 | Stop reason: SDUPTHER

## 2023-03-10 DIAGNOSIS — R11.0 NAUSEA: ICD-10-CM

## 2023-03-10 RX ORDER — ONDANSETRON 4 MG/1
4 TABLET, ORALLY DISINTEGRATING ORAL EVERY 8 HOURS PRN
Qty: 20 TABLET | Refills: 0 | Status: SHIPPED | OUTPATIENT
Start: 2023-03-10

## 2023-03-10 NOTE — TELEPHONE ENCOUNTER
"Caller: Freedom Stevens \"Toro\"    Relationship: Self    Best call back number:[phone]    Requested Prescriptions:   Requested Prescriptions     Pending Prescriptions Disp Refills   • ondansetron ODT (ZOFRAN-ODT) 4 MG disintegrating tablet 20 tablet 0        Pharmacy where request should be sent: [facility] [address] [place] [address] EULOGIO KENNEDY [facility] [phone] [facility] [phone] FX     Does the patient have less than a 3 day supply:  [x] Yes  [] No    Would you like a call back once the refill request has been completed: [] Yes [x] No    If the office needs to give you a call back, can they leave a voicemail: [] Yes [x] No    Nga Osman, PCT   03/10/23 11:55 EST       "

## 2023-03-14 ENCOUNTER — ANTICOAGULATION VISIT (OUTPATIENT)
Dept: FAMILY MEDICINE CLINIC | Age: 58
End: 2023-03-14
Payer: COMMERCIAL

## 2023-03-14 VITALS — SYSTOLIC BLOOD PRESSURE: 125 MMHG | HEART RATE: 81 BPM | DIASTOLIC BLOOD PRESSURE: 83 MMHG

## 2023-03-14 DIAGNOSIS — Z95.2 HX OF AORTIC VALVE REPLACEMENT, MECHANICAL: Primary | ICD-10-CM

## 2023-03-14 LAB — INR PPP: 2.6 (ref 2.5–3.5)

## 2023-03-14 PROCEDURE — 85610 PROTHROMBIN TIME: CPT | Performed by: FAMILY MEDICINE

## 2023-03-14 PROCEDURE — 36416 COLLJ CAPILLARY BLOOD SPEC: CPT | Performed by: FAMILY MEDICINE

## 2023-03-14 PROCEDURE — 93793 ANTICOAG MGMT PT WARFARIN: CPT | Performed by: FAMILY MEDICINE

## 2023-03-28 ENCOUNTER — ANTICOAGULATION VISIT (OUTPATIENT)
Dept: FAMILY MEDICINE CLINIC | Age: 58
End: 2023-03-28
Payer: COMMERCIAL

## 2023-03-28 VITALS — SYSTOLIC BLOOD PRESSURE: 137 MMHG | HEART RATE: 90 BPM | DIASTOLIC BLOOD PRESSURE: 84 MMHG

## 2023-03-28 DIAGNOSIS — Z95.2 HX OF AORTIC VALVE REPLACEMENT, MECHANICAL: Primary | ICD-10-CM

## 2023-03-28 LAB — INR PPP: 2.4 (ref 2.5–3.5)

## 2023-03-28 PROCEDURE — 93793 ANTICOAG MGMT PT WARFARIN: CPT | Performed by: FAMILY MEDICINE

## 2023-03-28 PROCEDURE — 36416 COLLJ CAPILLARY BLOOD SPEC: CPT | Performed by: FAMILY MEDICINE

## 2023-03-28 PROCEDURE — 85610 PROTHROMBIN TIME: CPT | Performed by: FAMILY MEDICINE

## 2023-04-03 DIAGNOSIS — E78.2 MIXED HYPERLIPIDEMIA: ICD-10-CM

## 2023-04-03 DIAGNOSIS — I10 ESSENTIAL HYPERTENSION: ICD-10-CM

## 2023-04-03 RX ORDER — PRAVASTATIN SODIUM 20 MG
TABLET ORAL
Qty: 90 TABLET | Refills: 0 | Status: SHIPPED | OUTPATIENT
Start: 2023-04-03

## 2023-04-03 RX ORDER — LOSARTAN POTASSIUM 25 MG/1
TABLET ORAL
Qty: 360 TABLET | Refills: 0 | Status: SHIPPED | OUTPATIENT
Start: 2023-04-03

## 2023-04-03 RX ORDER — WARFARIN SODIUM 1 MG/1
TABLET ORAL
Qty: 90 TABLET | Refills: 0 | Status: SHIPPED | OUTPATIENT
Start: 2023-04-03

## 2023-04-03 RX ORDER — WARFARIN SODIUM 5 MG/1
TABLET ORAL
Qty: 90 TABLET | Refills: 0 | Status: SHIPPED | OUTPATIENT
Start: 2023-04-03

## 2023-04-11 ENCOUNTER — ANTICOAGULATION VISIT (OUTPATIENT)
Dept: FAMILY MEDICINE CLINIC | Age: 58
End: 2023-04-11
Payer: COMMERCIAL

## 2023-04-11 VITALS — SYSTOLIC BLOOD PRESSURE: 134 MMHG | HEART RATE: 76 BPM | DIASTOLIC BLOOD PRESSURE: 79 MMHG

## 2023-04-11 DIAGNOSIS — Z95.2 HX OF AORTIC VALVE REPLACEMENT, MECHANICAL: Primary | ICD-10-CM

## 2023-04-11 LAB — INR PPP: 2.7 (ref 2.5–3.5)

## 2023-04-11 PROCEDURE — 93793 ANTICOAG MGMT PT WARFARIN: CPT | Performed by: FAMILY MEDICINE

## 2023-04-11 PROCEDURE — 85610 PROTHROMBIN TIME: CPT | Performed by: FAMILY MEDICINE

## 2023-04-11 PROCEDURE — 36416 COLLJ CAPILLARY BLOOD SPEC: CPT | Performed by: FAMILY MEDICINE

## 2023-04-25 ENCOUNTER — ANTICOAGULATION VISIT (OUTPATIENT)
Dept: FAMILY MEDICINE CLINIC | Age: 58
End: 2023-04-25
Payer: COMMERCIAL

## 2023-04-25 VITALS — DIASTOLIC BLOOD PRESSURE: 84 MMHG | HEART RATE: 99 BPM | SYSTOLIC BLOOD PRESSURE: 135 MMHG

## 2023-04-25 DIAGNOSIS — Z95.2 HX OF AORTIC VALVE REPLACEMENT, MECHANICAL: Primary | ICD-10-CM

## 2023-04-25 DIAGNOSIS — E11.9 TYPE 2 DIABETES MELLITUS WITHOUT COMPLICATION, WITHOUT LONG-TERM CURRENT USE OF INSULIN: ICD-10-CM

## 2023-04-25 LAB — INR PPP: 2.9 (ref 2.5–3.5)

## 2023-04-25 PROCEDURE — 36416 COLLJ CAPILLARY BLOOD SPEC: CPT | Performed by: FAMILY MEDICINE

## 2023-04-25 PROCEDURE — 93793 ANTICOAG MGMT PT WARFARIN: CPT | Performed by: FAMILY MEDICINE

## 2023-04-25 PROCEDURE — 85610 PROTHROMBIN TIME: CPT | Performed by: FAMILY MEDICINE

## 2023-04-28 ENCOUNTER — TELEPHONE (OUTPATIENT)
Dept: FAMILY MEDICINE CLINIC | Age: 58
End: 2023-04-28

## 2023-04-28 ENCOUNTER — LAB (OUTPATIENT)
Dept: LAB | Facility: HOSPITAL | Age: 58
End: 2023-04-28
Payer: COMMERCIAL

## 2023-04-28 ENCOUNTER — OFFICE VISIT (OUTPATIENT)
Dept: FAMILY MEDICINE CLINIC | Age: 58
End: 2023-04-28
Payer: COMMERCIAL

## 2023-04-28 VITALS
HEIGHT: 71 IN | OXYGEN SATURATION: 94 % | SYSTOLIC BLOOD PRESSURE: 131 MMHG | BODY MASS INDEX: 40.71 KG/M2 | DIASTOLIC BLOOD PRESSURE: 70 MMHG | HEART RATE: 85 BPM | WEIGHT: 290.8 LBS | TEMPERATURE: 98.6 F

## 2023-04-28 DIAGNOSIS — E78.2 MIXED HYPERLIPIDEMIA: ICD-10-CM

## 2023-04-28 DIAGNOSIS — F41.8 OTHER SPECIFIED ANXIETY DISORDERS: ICD-10-CM

## 2023-04-28 DIAGNOSIS — I71.21 ANEURYSM OF ASCENDING AORTA WITHOUT RUPTURE: ICD-10-CM

## 2023-04-28 DIAGNOSIS — Z95.2 HX OF AORTIC VALVE REPLACEMENT, MECHANICAL: ICD-10-CM

## 2023-04-28 DIAGNOSIS — E11.9 TYPE 2 DIABETES MELLITUS WITHOUT COMPLICATION, WITHOUT LONG-TERM CURRENT USE OF INSULIN: ICD-10-CM

## 2023-04-28 DIAGNOSIS — F31.61 BIPOLAR DISORDER, CURRENT EPISODE MIXED, MILD: ICD-10-CM

## 2023-04-28 DIAGNOSIS — B30.9 ACUTE VIRAL CONJUNCTIVITIS OF RIGHT EYE: Primary | ICD-10-CM

## 2023-04-28 DIAGNOSIS — I10 ESSENTIAL HYPERTENSION: ICD-10-CM

## 2023-04-28 PROBLEM — J96.01 ACUTE RESPIRATORY FAILURE WITH HYPOXIA: Status: RESOLVED | Noted: 2023-01-21 | Resolved: 2023-04-28

## 2023-04-28 PROBLEM — R41.82 ALTERED MENTAL STATUS: Status: RESOLVED | Noted: 2023-01-20 | Resolved: 2023-04-28

## 2023-04-28 LAB
ALBUMIN SERPL-MCNC: 4.6 G/DL (ref 3.5–5.2)
ALBUMIN UR-MCNC: 14.7 MG/DL
ALBUMIN/GLOB SERPL: 1.5 G/DL
ALP SERPL-CCNC: 71 U/L (ref 39–117)
ALT SERPL W P-5'-P-CCNC: 40 U/L (ref 1–41)
ANION GAP SERPL CALCULATED.3IONS-SCNC: 10 MMOL/L (ref 5–15)
AST SERPL-CCNC: 41 U/L (ref 1–40)
BASOPHILS # BLD AUTO: 0.05 10*3/MM3 (ref 0–0.2)
BASOPHILS NFR BLD AUTO: 0.6 % (ref 0–1.5)
BILIRUB SERPL-MCNC: 0.5 MG/DL (ref 0–1.2)
BUN SERPL-MCNC: 9 MG/DL (ref 6–20)
BUN/CREAT SERPL: 9.1 (ref 7–25)
CALCIUM SPEC-SCNC: 10.1 MG/DL (ref 8.6–10.5)
CHLORIDE SERPL-SCNC: 104 MMOL/L (ref 98–107)
CHOLEST SERPL-MCNC: 125 MG/DL (ref 0–200)
CO2 SERPL-SCNC: 26 MMOL/L (ref 22–29)
CREAT SERPL-MCNC: 0.99 MG/DL (ref 0.76–1.27)
CREAT UR-MCNC: 137.5 MG/DL
DEPRECATED RDW RBC AUTO: 45.5 FL (ref 37–54)
EGFRCR SERPLBLD CKD-EPI 2021: 87.8 ML/MIN/1.73
EOSINOPHIL # BLD AUTO: 0.4 10*3/MM3 (ref 0–0.4)
EOSINOPHIL NFR BLD AUTO: 5 % (ref 0.3–6.2)
ERYTHROCYTE [DISTWIDTH] IN BLOOD BY AUTOMATED COUNT: 13.3 % (ref 12.3–15.4)
GLOBULIN UR ELPH-MCNC: 3 GM/DL
GLUCOSE SERPL-MCNC: 174 MG/DL (ref 65–99)
HBA1C MFR BLD: 6.4 % (ref 4.8–5.6)
HCT VFR BLD AUTO: 41.5 % (ref 37.5–51)
HDLC SERPL-MCNC: 32 MG/DL (ref 40–60)
HGB BLD-MCNC: 13.3 G/DL (ref 13–17.7)
IMM GRANULOCYTES # BLD AUTO: 0.03 10*3/MM3 (ref 0–0.05)
IMM GRANULOCYTES NFR BLD AUTO: 0.4 % (ref 0–0.5)
LDLC SERPL CALC-MCNC: 64 MG/DL (ref 0–100)
LDLC/HDLC SERPL: 1.86 {RATIO}
LYMPHOCYTES # BLD AUTO: 0.95 10*3/MM3 (ref 0.7–3.1)
LYMPHOCYTES NFR BLD AUTO: 11.9 % (ref 19.6–45.3)
MCH RBC QN AUTO: 29.5 PG (ref 26.6–33)
MCHC RBC AUTO-ENTMCNC: 32 G/DL (ref 31.5–35.7)
MCV RBC AUTO: 92 FL (ref 79–97)
MICROALBUMIN/CREAT UR: 106.9 MG/G
MONOCYTES # BLD AUTO: 0.58 10*3/MM3 (ref 0.1–0.9)
MONOCYTES NFR BLD AUTO: 7.3 % (ref 5–12)
NEUTROPHILS NFR BLD AUTO: 5.97 10*3/MM3 (ref 1.7–7)
NEUTROPHILS NFR BLD AUTO: 74.8 % (ref 42.7–76)
PLATELET # BLD AUTO: 187 10*3/MM3 (ref 140–450)
PMV BLD AUTO: 10.8 FL (ref 6–12)
POTASSIUM SERPL-SCNC: 4.3 MMOL/L (ref 3.5–5.2)
PROT SERPL-MCNC: 7.6 G/DL (ref 6–8.5)
RBC # BLD AUTO: 4.51 10*6/MM3 (ref 4.14–5.8)
SODIUM SERPL-SCNC: 140 MMOL/L (ref 136–145)
TRIGL SERPL-MCNC: 168 MG/DL (ref 0–150)
VLDLC SERPL-MCNC: 29 MG/DL (ref 5–40)
WBC NRBC COR # BLD: 7.98 10*3/MM3 (ref 3.4–10.8)

## 2023-04-28 PROCEDURE — 80061 LIPID PANEL: CPT

## 2023-04-28 PROCEDURE — 82570 ASSAY OF URINE CREATININE: CPT

## 2023-04-28 PROCEDURE — 80053 COMPREHEN METABOLIC PANEL: CPT

## 2023-04-28 PROCEDURE — 85025 COMPLETE CBC W/AUTO DIFF WBC: CPT

## 2023-04-28 PROCEDURE — 83036 HEMOGLOBIN GLYCOSYLATED A1C: CPT

## 2023-04-28 PROCEDURE — 82043 UR ALBUMIN QUANTITATIVE: CPT

## 2023-04-28 PROCEDURE — 36415 COLL VENOUS BLD VENIPUNCTURE: CPT

## 2023-04-28 RX ORDER — POLYMYXIN B SULFATE AND TRIMETHOPRIM 1; 10000 MG/ML; [USP'U]/ML
1 SOLUTION OPHTHALMIC 3 TIMES DAILY
Qty: 10 ML | Refills: 0 | Status: SHIPPED | OUTPATIENT
Start: 2023-04-28

## 2023-04-28 NOTE — ASSESSMENT & PLAN NOTE
Stable on amlodipine 10 mg daily, hydrochlorothiazide 25 mg daily, losartan 25 mg daily.  No refills needed.  Checking labs.

## 2023-04-28 NOTE — ASSESSMENT & PLAN NOTE
He is on metformin for diabetes.  No refills needed.  He is on a statin, ARB.  He is due for foot exam (3/2022); exam today normal.  He is UTD on eye exam, last done 1/2023.  Checking labs.

## 2023-04-28 NOTE — ASSESSMENT & PLAN NOTE
Acute viral conjunctivitis of the right eye.  We discussed extensively today that antibiotic drops will not help clear up the infection any faster.  Use saline drops in the eye for comfort.  Infection control is paramount.  He may develop symptoms in the left eye, and should proceed with the same management.  Since we are heading into the weekend, I am going to send in Polytrim drops for him to use in the event that the eye becomes painful or does not start to clear up after another 24 hours or so.  We did discuss that use of the antibiotic drops will not affect the course of a viral conjunctivitis and should only be used if the symptoms occur, as this may indicate that he has developed a bacterial superinfection.

## 2023-04-28 NOTE — ASSESSMENT & PLAN NOTE
Currently on lithium, Mirapex, risperidone.  He did have multiple meds discontinued after his recent episode in the hospital.

## 2023-04-28 NOTE — PROGRESS NOTES
Chief Complaint  Diabetes (4 month follow up)    Subjective          Freedom Stevens presents to [facility] today for f/u of routine problems.    Red right eye with drainage started yesterday.  No gritty sensation or pain.  +Itching.  No photophobia.  Woke with his R eye crusted shut.  Mild cough, congestion or rhinorrhea.    He is on metformin for diabetes.  He is on a statin, ARB.  He is due for foot exam (3/2022).  He is UTD on eye exam, last done 1/2023.       He was diagnosed with a new 4.7 cm fusiform ascending aortic aneurysm.  He does follow with Dr. Tellez Cardiology.  They have referred him to CT Surgery for further evaluation.  He was told that he was not yet an operative candidate and to come back in a year.  He already has a history of ascending aortic aneurysm repair and aortic valve replacement.    He is currently on amlodipine, HCTZ, and losartan for hypertension.  Blood pressure has been well controlled.  No chest pain, palpitations, or shortness of breath.     He is on pravastatin for hyperlipidemia.  No problems with myalgias.      He is on warfarin for anticoagulation of a mechanical aortic valve. He follows with Dr. Tellez Cardiology.  We are managing the INR.      He is following with Dr. Mccauley for bipolar disorder with depressed mood and anxiety.  He is on a regimen of lithium, , risperidone, and pramipexole. His symptoms have been pretty well controlled.      He has DORIAN but has been unable to tolerate CPAP.        Current Outpatient Medications:   •  amLODIPine (NORVASC) 10 MG tablet, Take 1 tablet by mouth once daily (Patient taking differently: Take 1 tablet by mouth Daily.), Disp: 90 tablet, Rfl: 1  •  hydroCHLOROthiazide (HYDRODIURIL) 50 MG tablet, Take 0.5 tablets by mouth Daily., Disp: 30 tablet, Rfl: 0  •  lithium (ESKALITH) 450 MG CR tablet, Take 1 tablet by mouth 2 (Two) Times a Day., Disp: , Rfl:   •  losartan (COZAAR) 25 MG tablet,  "Take 2 tablets by mouth twice daily, Disp: 360 tablet, Rfl: 0  •  metFORMIN (GLUCOPHAGE) 500 MG tablet, Take 1 tablet by mouth once daily, Disp: 90 tablet, Rfl: 1  •  ondansetron ODT (ZOFRAN-ODT) 4 MG disintegrating tablet, Place 1 tablet on the tongue Every 8 (Eight) Hours As Needed for Nausea or Vomiting., Disp: 20 tablet, Rfl: 0  •  pramipexole (MIRAPEX) 0.5 MG tablet, Take 1 tablet by mouth 2 (Two) Times a Day., Disp: , Rfl:   •  pravastatin (PRAVACHOL) 20 MG tablet, TAKE 1 TABLET BY MOUTH EVERY DAY AT BEDTIME, Disp: 90 tablet, Rfl: 0  •  risperiDONE (risperDAL) 3 MG tablet, Take 2 mg by mouth every night at bedtime., Disp: , Rfl:   •  tamsulosin (FLOMAX) 0.4 MG capsule 24 hr capsule, Take 1 capsule by mouth Daily for 360 days., Disp: 90 capsule, Rfl: 3  •  warfarin (COUMADIN) 1 MG tablet, TAKE 1 TABLET BY MOUTH ONCE DAILY AT NIGHT, Disp: 90 tablet, Rfl: 0  •  warfarin (COUMADIN) 5 MG tablet, TAKE 1 TABLET BY MOUTH ONCE DAILY AT NIGHT, Disp: 90 tablet, Rfl: 0  •  carvedilol (COREG) 12.5 MG tablet, Take 1 tablet by mouth 2 (Two) Times a Day With Meals for 30 days., Disp: 60 tablet, Rfl: 0  •  trimethoprim-polymyxin b (Polytrim) 38418-1.1 UNIT/ML-% ophthalmic solution, Administer 1 drop to the right eye 3 (Three) Times a Day., Disp: 10 mL, Rfl: 0    Allergies:  Shellfish-derived products      Objective   Vital Signs:   Vitals:    04/28/23 0810   BP: 131/70   BP Location: Right arm   Patient Position: Sitting   Pulse: 85   Temp: 98.6 °F (37 °C)   TempSrc: Oral   SpO2: 94%   Weight: 132 kg (290 lb 12.8 oz)   Height: 180.3 cm (70.98\")       Physical Exam  Vitals reviewed.   Constitutional:       General: He is not in acute distress.     Appearance: Normal appearance. He is well-developed.   HENT:      Head: Normocephalic and atraumatic.      Right Ear: External ear normal.      Left Ear: External ear normal.   Eyes:      General:         Right eye: Discharge present. No foreign body.      Extraocular Movements: " Extraocular movements intact.      Conjunctiva/sclera:      Right eye: Right conjunctiva is injected. Chemosis and exudate present. No hemorrhage.     Pupils: Pupils are equal, round, and reactive to light.      Comments: +upper and lower R lid edema   Cardiovascular:      Rate and Rhythm: Normal rate and regular rhythm.      Pulses:           Dorsalis pedis pulses are 2+ on the right side and 2+ on the left side.      Heart sounds: No murmur heard.     Comments: +click  Pulmonary:      Effort: Pulmonary effort is normal.      Breath sounds: Normal breath sounds. No wheezing, rhonchi or rales.   Abdominal:      General: Bowel sounds are normal. There is no distension.      Palpations: Abdomen is soft.      Tenderness: There is no abdominal tenderness.   Musculoskeletal:         General: Normal range of motion.   Feet:      Right foot:      Protective Sensation: 3 sites tested. 3 sites sensed.      Skin integrity: Skin integrity normal. No ulcer or blister.      Toenail Condition: Right toenails are normal.      Left foot:      Protective Sensation: 3 sites tested. 3 sites sensed.      Skin integrity: Skin integrity normal. No ulcer or blister.      Toenail Condition: Left toenails are normal.      Comments: Diabetic Foot Exam Performed     Neurological:      Mental Status: He is alert.   Psychiatric:         Mood and Affect: Affect normal.           Lab Results   Component Value Date    GLUCOSE 185 (H) 01/22/2023    BUN 18 01/22/2023    CREATININE 1.01 01/22/2023    EGFRIFNONA 70 12/07/2021    EGFRIFAFRI 92 01/08/2018    BCR 17.8 01/22/2023    K 4.5 01/22/2023    CO2 30.2 (H) 01/22/2023    CALCIUM 8.9 01/22/2023    ALBUMIN 4.2 01/21/2023    LABIL2 1.5 03/11/2021    AST 37 01/21/2023    ALT 27 01/21/2023       Lab Results   Component Value Date    CHOL 121 09/13/2022    CHLPL 125 03/11/2021    TRIG 182 (H) 09/13/2022    HDL 35 (L) 09/13/2022    LDL 56 09/13/2022            Assessment and Plan    Diagnoses and all  orders for this visit:    1. Acute viral conjunctivitis of right eye (Primary)  Assessment & Plan:  Acute viral conjunctivitis of the right eye.  We discussed extensively today that antibiotic drops will not help clear up the infection any faster.  Use saline drops in the eye for comfort.  Infection control is paramount.  He may develop symptoms in the left eye, and should proceed with the same management.  Since we are heading into the weekend, I am going to send in Polytrim drops for him to use in the event that the eye becomes painful or does not start to clear up after another 24 hours or so.  We did discuss that use of the antibiotic drops will not affect the course of a viral conjunctivitis and should only be used if the symptoms occur, as this may indicate that he has developed a bacterial superinfection.    Orders:  -     trimethoprim-polymyxin b (Polytrim) 37590-4.1 UNIT/ML-% ophthalmic solution; Administer 1 drop to the right eye 3 (Three) Times a Day.  Dispense: 10 mL; Refill: 0    2. Type 2 diabetes mellitus without complication, without long-term current use of insulin  Assessment & Plan:  He is on metformin for diabetes.  No refills needed.  He is on a statin, ARB.  He is due for foot exam (3/2022); exam today normal.  He is UTD on eye exam, last done 1/2023.  Checking labs.    Orders:  -     Comprehensive Metabolic Panel; Future  -     Lipid Panel; Future  -     Hemoglobin A1c; Future  -     Microalbumin / Creatinine Urine Ratio - Urine, Clean Catch; Future    3. Essential hypertension  Assessment & Plan:  Stable on amlodipine 10 mg daily, hydrochlorothiazide 25 mg daily, losartan 25 mg daily.  No refills needed.  Checking labs.    Orders:  -     CBC & Differential; Future    4. Mixed hyperlipidemia  Assessment & Plan:  Stable on pravastatin 20 mg daily.  No refills needed.  Checking labs.      5. Bipolar disorder, current episode mixed, mild (HCC)  Overview:  Following with   [facility].    Assessment & Plan:  Currently on lithium, Mirapex, risperidone.  He did have multiple meds discontinued after his recent episode in the hospital.      6. Other specified anxiety disorders  Overview:  With Dr. Mccauley.  As per bipolar plan.      7. Aneurysm of ascending aorta without rupture  Overview:  He was evaluated by CT Surgery who recommended routine follow-up in 1 year.  They over read the CT report as a fusiform ascending aortic aneurysm of 4.5 as opposed to 4.7 cm.      8. Hx of aortic valve replacement, mechanical  Assessment & Plan:  On warfarin.  We are managing INR with goal range 2.5-3.5.  He has been therapeutic.        Follow Up   Return in about 3 months (around 7/28/2023).  Patient was given instructions and counseling regarding his condition or for health maintenance advice. Please see specific information pulled into the AVS if appropriate.           04/28/2023

## 2023-04-28 NOTE — TELEPHONE ENCOUNTER
[facility] called eye drops are on backorder, advised pt per Dr Barber that they can  saline eye drops otc, pt states he will get them picked up

## 2023-05-01 ENCOUNTER — OFFICE VISIT (OUTPATIENT)
Dept: FAMILY MEDICINE CLINIC | Age: 58
End: 2023-05-01
Payer: COMMERCIAL

## 2023-05-01 ENCOUNTER — HOSPITAL ENCOUNTER (OUTPATIENT)
Dept: GENERAL RADIOLOGY | Facility: HOSPITAL | Age: 58
Discharge: HOME | End: 2023-05-01
Admitting: NURSE PRACTITIONER
Payer: COMMERCIAL

## 2023-05-01 VITALS
HEIGHT: 71 IN | BODY MASS INDEX: 41.05 KG/M2 | SYSTOLIC BLOOD PRESSURE: 127 MMHG | DIASTOLIC BLOOD PRESSURE: 85 MMHG | OXYGEN SATURATION: 92 % | HEART RATE: 91 BPM | TEMPERATURE: 98.2 F | WEIGHT: 293.2 LBS

## 2023-05-01 DIAGNOSIS — R05.9 COUGH, UNSPECIFIED TYPE: Primary | ICD-10-CM

## 2023-05-01 DIAGNOSIS — R07.89 LEFT-SIDED CHEST WALL PAIN: ICD-10-CM

## 2023-05-01 DIAGNOSIS — R05.9 COUGH, UNSPECIFIED TYPE: ICD-10-CM

## 2023-05-01 DIAGNOSIS — B30.9 ACUTE VIRAL CONJUNCTIVITIS OF BOTH EYES: ICD-10-CM

## 2023-05-01 LAB
EXPIRATION DATE: NORMAL
FLUAV AG UPPER RESP QL IA.RAPID: NOT DETECTED
FLUBV AG UPPER RESP QL IA.RAPID: NOT DETECTED
INTERNAL CONTROL: NORMAL
Lab: NORMAL
SARS-COV-2 AG UPPER RESP QL IA.RAPID: NOT DETECTED

## 2023-05-01 PROCEDURE — 99213 OFFICE O/P EST LOW 20 MIN: CPT | Performed by: NURSE PRACTITIONER

## 2023-05-01 PROCEDURE — 71046 X-RAY EXAM CHEST 2 VIEWS: CPT

## 2023-05-01 PROCEDURE — 87428 SARSCOV & INF VIR A&B AG IA: CPT | Performed by: NURSE PRACTITIONER

## 2023-05-01 RX ORDER — GUAIFENESIN 600 MG/1
1200 TABLET, EXTENDED RELEASE ORAL 2 TIMES DAILY
Qty: 40 TABLET | Refills: 0 | Status: SHIPPED | OUTPATIENT
Start: 2023-05-01

## 2023-05-01 NOTE — PROGRESS NOTES
"Chief Complaint  Rib Pain (Pt states that he thinks he may have broken a rib; hurts to breathe and hurts to cough ), Shortness of Breath, Nasal Congestion, and Cough    Subjective        Freedom Stevens presents to [facility]  Shortness of Breath  This is a new problem. The current episode started in the past 7 days. The problem has been gradually improving. Associated symptoms include chest pain and sputum production. Pertinent negatives include no fever or headaches. The symptoms are aggravated by URIs. Treatments tried: Nyquil. The treatment provided moderate relief. His past medical history is significant for a heart failure.   Cough  This is a new problem. The current episode started in the past 7 days. Associated symptoms include chest pain and shortness of breath. Pertinent negatives include no fever or headaches. Treatments tried: Nyquil.       Objective   Vital Signs:  /85 (BP Location: Left arm, Patient Position: Sitting, Cuff Size: Large Adult)   Pulse 91   Temp 98.2 °F (36.8 °C) (Oral)   Ht 180.3 cm (70.98\")   Wt 133 kg (293 lb 3.2 oz)   SpO2 92% Comment: room air  BMI 40.92 kg/m²   Estimated body mass index is 40.92 kg/m² as calculated from the following:    Height as of this encounter: 180.3 cm (70.98\").    Weight as of this encounter: 133 kg (293 lb 3.2 oz).             Physical Exam  Constitutional:       Appearance: He is obese. He is ill-appearing.   HENT:      Head: Normocephalic.      Nose: Rhinorrhea present.   Eyes:      Conjunctiva/sclera:      Right eye: Right conjunctiva is injected. No exudate.     Left eye: Left conjunctiva is injected. No exudate.     Comments: Bilateral erythema and clear drainage noted   Cardiovascular:      Rate and Rhythm: Normal rate.   Pulmonary:      Effort: Pulmonary effort is normal. No respiratory distress.      Breath sounds: No stridor. Rhonchi present. No wheezing or rales.   Musculoskeletal:      Right lower leg: " Edema present.      Left lower leg: Edema present.   Skin:     General: Skin is warm and dry.   Neurological:      Mental Status: He is alert and oriented to person, place, and time.   Psychiatric:         Mood and Affect: Mood normal.        Result Review :          Results for orders placed or performed in visit on 05/01/23   POCT SARS-CoV-2 Antigen KARLA + Flu    Specimen: Swab   Result Value Ref Range    SARS Antigen Not Detected Not Detected, Presumptive Negative    Influenza A Antigen KARLA Not Detected Not Detected    Influenza B Antigen KARLA Not Detected Not Detected    Internal Control Passed Passed    Lot Number [id number]     Expiration Date 12/6/23      PROCEDURE:  XR CHEST PA AND LATERAL     COMPARISON: [facility], CR, XR CHEST 1 VW, 1/20/2023, 18:23.     INDICATIONS:  Chest pain with deep breaths and productive cough for one week     FINDINGS:          Cardiac and mediastinal contours are within normal limits.  Status post median sternotomy.  Lungs   are clear.  Regional skeleton is unremarkable.     IMPRESSION:                 1. No acute cardiopulmonary disease.         Assessment and Plan   Diagnoses and all orders for this visit:    1. Cough, unspecified type (Primary)  Comments:  Likely viral and self limiting. CXR normal. O2 sat at baseline. Continue to monitor at home. F/U in one week if symptoms persist, ER for worsening.   Orders:  -     POCT SARS-CoV-2 Antigen KARLA + Flu  -     Cancel: XR Chest PA & Lateral; Future  -     guaiFENesin (Mucinex) 600 MG 12 hr tablet; Take 2 tablets by mouth 2 (Two) Times a Day.  Dispense: 40 tablet; Refill: 0  -     XR Chest PA & Lateral; Future  -     promethazine-dextromethorphan (PROMETHAZINE-DM) 6.25-15 MG/5ML syrup; Take 5 mL by mouth At Night As Needed for Cough.  Dispense: 118 mL; Refill: 0    2. Left-sided chest wall pain  Comments:  CXR negative for rib fracture, discussed conservative measures and deep breathing exercise.   Orders:  -      methocarbamol (ROBAXIN) 500 MG tablet; Take 1 tablet by mouth 4 (Four) Times a Day.  Dispense: 40 tablet; Refill: 0    3. Acute viral conjunctivitis of both eyes  Comments:  Improving             Follow Up   Return if symptoms worsen or fail to improve.  Patient was given instructions and counseling regarding his condition or for health maintenance advice. Please see specific information pulled into the AVS if appropriate.

## 2023-05-02 RX ORDER — METHOCARBAMOL 500 MG/1
500 TABLET, FILM COATED ORAL 4 TIMES DAILY
Qty: 40 TABLET | Refills: 0 | Status: SHIPPED | OUTPATIENT
Start: 2023-05-02

## 2023-05-02 RX ORDER — DEXTROMETHORPHAN HYDROBROMIDE AND PROMETHAZINE HYDROCHLORIDE 15; 6.25 MG/5ML; MG/5ML
5 SYRUP ORAL NIGHTLY PRN
Qty: 118 ML | Refills: 0 | Status: SHIPPED | OUTPATIENT
Start: 2023-05-02

## 2023-05-17 DIAGNOSIS — I10 ESSENTIAL HYPERTENSION: ICD-10-CM

## 2023-05-17 RX ORDER — AMLODIPINE BESYLATE 10 MG/1
TABLET ORAL
Qty: 90 TABLET | Refills: 0 | Status: SHIPPED | OUTPATIENT
Start: 2023-05-17

## 2023-05-23 ENCOUNTER — ANTICOAGULATION VISIT (OUTPATIENT)
Dept: FAMILY MEDICINE CLINIC | Age: 58
End: 2023-05-23
Payer: COMMERCIAL

## 2023-05-23 VITALS — HEART RATE: 93 BPM | DIASTOLIC BLOOD PRESSURE: 88 MMHG | SYSTOLIC BLOOD PRESSURE: 149 MMHG

## 2023-05-23 DIAGNOSIS — Z95.2 HX OF AORTIC VALVE REPLACEMENT, MECHANICAL: Primary | ICD-10-CM

## 2023-05-23 LAB — INR PPP: 1.8 (ref 2.5–3.5)

## 2023-05-23 PROCEDURE — 93793 ANTICOAG MGMT PT WARFARIN: CPT | Performed by: FAMILY MEDICINE

## 2023-05-23 PROCEDURE — 36416 COLLJ CAPILLARY BLOOD SPEC: CPT | Performed by: FAMILY MEDICINE

## 2023-05-23 PROCEDURE — 85610 PROTHROMBIN TIME: CPT | Performed by: FAMILY MEDICINE

## 2023-05-30 ENCOUNTER — ANTICOAGULATION VISIT (OUTPATIENT)
Dept: FAMILY MEDICINE CLINIC | Age: 58
End: 2023-05-30

## 2023-05-30 VITALS — SYSTOLIC BLOOD PRESSURE: 155 MMHG | HEART RATE: 77 BPM | DIASTOLIC BLOOD PRESSURE: 93 MMHG

## 2023-05-30 DIAGNOSIS — Z95.2 HX OF AORTIC VALVE REPLACEMENT, MECHANICAL: Primary | ICD-10-CM

## 2023-05-30 LAB — INR PPP: 2.1 (ref 2.5–3.5)

## 2023-05-30 PROCEDURE — 93793 ANTICOAG MGMT PT WARFARIN: CPT | Performed by: FAMILY MEDICINE

## 2023-05-30 PROCEDURE — 36416 COLLJ CAPILLARY BLOOD SPEC: CPT | Performed by: FAMILY MEDICINE

## 2023-05-30 PROCEDURE — 85610 PROTHROMBIN TIME: CPT | Performed by: FAMILY MEDICINE

## 2023-06-06 ENCOUNTER — ANTICOAGULATION VISIT (OUTPATIENT)
Dept: FAMILY MEDICINE CLINIC | Age: 58
End: 2023-06-06
Payer: COMMERCIAL

## 2023-06-06 VITALS — SYSTOLIC BLOOD PRESSURE: 143 MMHG | DIASTOLIC BLOOD PRESSURE: 86 MMHG | HEART RATE: 98 BPM

## 2023-06-06 DIAGNOSIS — Z95.2 HX OF AORTIC VALVE REPLACEMENT, MECHANICAL: Primary | ICD-10-CM

## 2023-06-06 LAB — INR PPP: 2.9 (ref 2.5–3.5)

## 2023-06-06 PROCEDURE — 85610 PROTHROMBIN TIME: CPT | Performed by: FAMILY MEDICINE

## 2023-06-06 PROCEDURE — 93793 ANTICOAG MGMT PT WARFARIN: CPT | Performed by: FAMILY MEDICINE

## 2023-06-06 PROCEDURE — 36416 COLLJ CAPILLARY BLOOD SPEC: CPT | Performed by: FAMILY MEDICINE

## 2023-06-14 ENCOUNTER — LAB (OUTPATIENT)
Dept: LAB | Facility: HOSPITAL | Age: 58
End: 2023-06-14
Payer: COMMERCIAL

## 2023-06-14 ENCOUNTER — OFFICE VISIT (OUTPATIENT)
Dept: FAMILY MEDICINE CLINIC | Age: 58
End: 2023-06-14
Payer: COMMERCIAL

## 2023-06-14 VITALS
SYSTOLIC BLOOD PRESSURE: 131 MMHG | DIASTOLIC BLOOD PRESSURE: 71 MMHG | BODY MASS INDEX: 40.43 KG/M2 | OXYGEN SATURATION: 94 % | WEIGHT: 288.8 LBS | HEIGHT: 71 IN | HEART RATE: 88 BPM

## 2023-06-14 DIAGNOSIS — R10.84 GENERALIZED ABDOMINAL PAIN: ICD-10-CM

## 2023-06-14 DIAGNOSIS — R19.7 DIARRHEA, UNSPECIFIED TYPE: ICD-10-CM

## 2023-06-14 DIAGNOSIS — R11.0 NAUSEA: Primary | ICD-10-CM

## 2023-06-14 DIAGNOSIS — R10.13 EPIGASTRIC PAIN: ICD-10-CM

## 2023-06-14 DIAGNOSIS — T70.20XA: ICD-10-CM

## 2023-06-14 DIAGNOSIS — E11.9 TYPE 2 DIABETES MELLITUS WITHOUT COMPLICATION, WITHOUT LONG-TERM CURRENT USE OF INSULIN: ICD-10-CM

## 2023-06-14 LAB — UREA BREATH TEST QL: NEGATIVE

## 2023-06-14 PROCEDURE — 83013 H PYLORI (C-13) BREATH: CPT

## 2023-06-14 RX ORDER — ONDANSETRON 4 MG/1
4 TABLET, ORALLY DISINTEGRATING ORAL EVERY 8 HOURS PRN
Qty: 20 TABLET | Refills: 0 | Status: SHIPPED | OUTPATIENT
Start: 2023-06-14

## 2023-06-14 NOTE — PROGRESS NOTES
"Chief Complaint  Nausea (Symptoms ongoing for 4 weeks since returning from [place]. Pt states \"I feel sick all the time\") and Headache    Subjective    Patient is in today with complaints of nausea, abdominal pain, reflux, headaches, and just not feeling well for 4 weeks since returning from [place].  Patient reports that he has had H. pylori in the past and his wife is concerned that he has that again, as the symptoms are very similar.  He reports recent travel to [place] where he was told not to drink the water, but he was not thinking about it he was having the water in his coffee each day.  Patient reports worsening when he lies down at night.  Has tried over-the-counter Tums, which provided minimal relief.  Denies diarrhea, had bowel movement this morning which was normal.        Freedom Stevens presents to [facility]  Nausea  This is a recurrent problem. The current episode started 1 to 4 weeks ago. The problem occurs daily. The problem has been waxing and waning. Associated symptoms include abdominal pain, fatigue, headaches, nausea and neck pain. Pertinent negatives include no change in bowel habit, chills, fever or urinary symptoms. The symptoms are aggravated by eating (Lying flat). Treatments tried: Tums. The treatment provided mild relief.   LBM 6/14/2023    Objective   Vital Signs:  /71 (BP Location: Right arm, Patient Position: Sitting, Cuff Size: Large Adult)   Pulse 88   Ht 180.3 cm (70.98\")   Wt 131 kg (288 lb 12.8 oz)   SpO2 94%   BMI 40.30 kg/m²   Estimated body mass index is 40.3 kg/m² as calculated from the following:    Height as of this encounter: 180.3 cm (70.98\").    Weight as of this encounter: 131 kg (288 lb 12.8 oz).             Physical Exam  HENT:      Head: Normocephalic.   Cardiovascular:      Rate and Rhythm: Normal rate and regular rhythm.   Pulmonary:      Effort: Pulmonary effort is normal.   Abdominal:      General: Bowel sounds are " normal. There is distension.      Tenderness: There is no abdominal tenderness.      Hernia: A hernia is present.   Skin:     General: Skin is warm and dry.   Neurological:      Mental Status: He is alert and oriented to person, place, and time.   Psychiatric:         Mood and Affect: Mood normal.      Result Review :                   Assessment and Plan   Diagnoses and all orders for this visit:    1. Nausea (Primary)  -     ondansetron ODT (ZOFRAN-ODT) 4 MG disintegrating tablet; Place 1 tablet on the tongue Every 8 (Eight) Hours As Needed for Nausea or Vomiting.  Dispense: 20 tablet; Refill: 0    2. Generalized abdominal pain  -     Enteric Bacterial Panel - Stool, Per Rectum; Future    3. Epigastric pain  -     H. Pylori Breath Test - Breath, Lung; Future    4. Diarrhea, unspecified type  -     Enteric Bacterial Panel - Stool, Per Rectum; Future    We will rule out any reoccurrence of H. pylori today.  Stool studies if pain persist and or he develops diarrhea.  If H. pylori negative we will move forward with doing CT scan of abdomen and pelvis and further testing.  Last hemoglobin A1c reviewed and was at 6.4, patient is taking metformin. Vitals stable. ER for shortness of breath, chest pain, vomiting or inability to hold liquids down for 24 hours.         Follow Up   Return in 3 weeks (on 7/5/2023), or if symptoms worsen or fail to improve, for Next scheduled follow up with PCP.  Patient was given instructions and counseling regarding his condition or for health maintenance advice. Please see specific information pulled into the AVS if appropriate.

## 2023-06-15 ENCOUNTER — LAB (OUTPATIENT)
Dept: LAB | Facility: HOSPITAL | Age: 58
End: 2023-06-15
Payer: COMMERCIAL

## 2023-06-15 ENCOUNTER — HOSPITAL ENCOUNTER (OUTPATIENT)
Dept: GENERAL RADIOLOGY | Facility: HOSPITAL | Age: 58
Discharge: HOME | End: 2023-06-15
Payer: COMMERCIAL

## 2023-06-15 DIAGNOSIS — R10.84 GENERALIZED ABDOMINAL PAIN: ICD-10-CM

## 2023-06-15 DIAGNOSIS — T70.20XA: ICD-10-CM

## 2023-06-15 DIAGNOSIS — E11.9 TYPE 2 DIABETES MELLITUS WITHOUT COMPLICATION, WITHOUT LONG-TERM CURRENT USE OF INSULIN: ICD-10-CM

## 2023-06-15 DIAGNOSIS — R11.0 NAUSEA: ICD-10-CM

## 2023-06-15 LAB
ALBUMIN SERPL-MCNC: 4.6 G/DL (ref 3.5–5.2)
ALBUMIN/GLOB SERPL: 1.7 G/DL
ALP SERPL-CCNC: 70 U/L (ref 39–117)
ALT SERPL W P-5'-P-CCNC: 52 U/L (ref 1–41)
AMYLASE SERPL-CCNC: 58 U/L (ref 28–100)
ANION GAP SERPL CALCULATED.3IONS-SCNC: 11.3 MMOL/L (ref 5–15)
AST SERPL-CCNC: 57 U/L (ref 1–40)
BILIRUB SERPL-MCNC: 0.4 MG/DL (ref 0–1.2)
BUN SERPL-MCNC: 12 MG/DL (ref 6–20)
BUN/CREAT SERPL: 12.6 (ref 7–25)
CALCIUM SPEC-SCNC: 10.1 MG/DL (ref 8.6–10.5)
CHLORIDE SERPL-SCNC: 98 MMOL/L (ref 98–107)
CO2 SERPL-SCNC: 26.7 MMOL/L (ref 22–29)
CREAT SERPL-MCNC: 0.95 MG/DL (ref 0.76–1.27)
DEPRECATED RDW RBC AUTO: 41.9 FL (ref 37–54)
EGFRCR SERPLBLD CKD-EPI 2021: 92.2 ML/MIN/1.73
ERYTHROCYTE [DISTWIDTH] IN BLOOD BY AUTOMATED COUNT: 12.8 % (ref 12.3–15.4)
GLOBULIN UR ELPH-MCNC: 2.7 GM/DL
GLUCOSE SERPL-MCNC: 181 MG/DL (ref 65–99)
HBA1C MFR BLD: 7.1 % (ref 4.8–5.6)
HCT VFR BLD AUTO: 43.7 % (ref 37.5–51)
HGB BLD-MCNC: 14.4 G/DL (ref 13–17.7)
LIPASE SERPL-CCNC: 36 U/L (ref 13–60)
MCH RBC QN AUTO: 29.2 PG (ref 26.6–33)
MCHC RBC AUTO-ENTMCNC: 33 G/DL (ref 31.5–35.7)
MCV RBC AUTO: 88.6 FL (ref 79–97)
PLATELET # BLD AUTO: 200 10*3/MM3 (ref 140–450)
PMV BLD AUTO: 10.8 FL (ref 6–12)
POTASSIUM SERPL-SCNC: 4.6 MMOL/L (ref 3.5–5.2)
PROT SERPL-MCNC: 7.3 G/DL (ref 6–8.5)
RBC # BLD AUTO: 4.93 10*6/MM3 (ref 4.14–5.8)
SODIUM SERPL-SCNC: 136 MMOL/L (ref 136–145)
WBC NRBC COR # BLD: 5.12 10*3/MM3 (ref 3.4–10.8)

## 2023-06-15 PROCEDURE — 82150 ASSAY OF AMYLASE: CPT

## 2023-06-15 PROCEDURE — 80053 COMPREHEN METABOLIC PANEL: CPT

## 2023-06-15 PROCEDURE — 71046 X-RAY EXAM CHEST 2 VIEWS: CPT

## 2023-06-15 PROCEDURE — 85027 COMPLETE CBC AUTOMATED: CPT

## 2023-06-15 PROCEDURE — 83036 HEMOGLOBIN GLYCOSYLATED A1C: CPT

## 2023-06-15 PROCEDURE — 83690 ASSAY OF LIPASE: CPT

## 2023-06-15 PROCEDURE — 36415 COLL VENOUS BLD VENIPUNCTURE: CPT

## 2023-06-15 NOTE — PROGRESS NOTES
H.pylori breath test negative. Please have patient come to the lab today for further testing. ER if symptoms have worsened or if he is having chest pain or shortness of breath.

## 2023-06-20 NOTE — PROGRESS NOTES
CXR normal. Complete blood count normal, no signs of infection or anemia at this time.  A1C up to 7.1, continue metformin and low sweets and simple carbohydrate diet. Keep upcoming appointment with PCP to discuss if further treatment is needed if symptoms do not improve. Liver enzymes slightly elevated, this may be related to recent viral illness. Minimal alcohol and tylenol use recommended. No signs of pancreatitis at this time. Is patient feeling better?

## 2023-06-22 NOTE — PROGRESS NOTES
Is he wearing his CPAP at night?   We will move forward with doing CT scan of abdomin and pelvis, they will call him to set up that appointment. ER for worsening.

## 2023-07-24 ENCOUNTER — LAB (OUTPATIENT)
Dept: LAB | Facility: HOSPITAL | Age: 58
End: 2023-07-24
Payer: COMMERCIAL

## 2023-07-24 ENCOUNTER — TRANSCRIBE ORDERS (OUTPATIENT)
Dept: ADMINISTRATIVE | Facility: HOSPITAL | Age: 58
End: 2023-07-24
Payer: COMMERCIAL

## 2023-07-24 DIAGNOSIS — Z79.899 ENCOUNTER FOR LONG-TERM (CURRENT) USE OF OTHER MEDICATIONS: Primary | ICD-10-CM

## 2023-07-24 DIAGNOSIS — Z79.899 ENCOUNTER FOR LONG-TERM (CURRENT) USE OF OTHER MEDICATIONS: ICD-10-CM

## 2023-07-24 LAB
ALBUMIN SERPL-MCNC: 4.6 G/DL (ref 3.5–5.2)
ALBUMIN/GLOB SERPL: 2 G/DL
ALP SERPL-CCNC: 75 U/L (ref 39–117)
ALT SERPL W P-5'-P-CCNC: 43 U/L (ref 1–41)
ANION GAP SERPL CALCULATED.3IONS-SCNC: 10 MMOL/L (ref 5–15)
AST SERPL-CCNC: 51 U/L (ref 1–40)
BILIRUB SERPL-MCNC: 0.4 MG/DL (ref 0–1.2)
BUN SERPL-MCNC: 11 MG/DL (ref 6–20)
BUN/CREAT SERPL: 11.3 (ref 7–25)
CALCIUM SPEC-SCNC: 9.6 MG/DL (ref 8.6–10.5)
CHLORIDE SERPL-SCNC: 103 MMOL/L (ref 98–107)
CO2 SERPL-SCNC: 25 MMOL/L (ref 22–29)
CREAT SERPL-MCNC: 0.97 MG/DL (ref 0.76–1.27)
EGFRCR SERPLBLD CKD-EPI 2021: 89.9 ML/MIN/1.73
GLOBULIN UR ELPH-MCNC: 2.3 GM/DL
GLUCOSE SERPL-MCNC: 211 MG/DL (ref 65–99)
LITHIUM SERPL-SCNC: 0.4 MMOL/L (ref 0.6–1.2)
POTASSIUM SERPL-SCNC: 4.3 MMOL/L (ref 3.5–5.2)
PROT SERPL-MCNC: 6.9 G/DL (ref 6–8.5)
SODIUM SERPL-SCNC: 138 MMOL/L (ref 136–145)
T3 SERPL-MCNC: 124 NG/DL (ref 80–200)
T4 SERPL-MCNC: 7.14 MCG/DL (ref 4.5–11.7)
TSH SERPL DL<=0.05 MIU/L-ACNC: 1.93 UIU/ML (ref 0.27–4.2)

## 2023-07-24 PROCEDURE — 84443 ASSAY THYROID STIM HORMONE: CPT

## 2023-07-24 PROCEDURE — 84436 ASSAY OF TOTAL THYROXINE: CPT

## 2023-07-24 PROCEDURE — 84480 ASSAY TRIIODOTHYRONINE (T3): CPT

## 2023-07-24 PROCEDURE — 36415 COLL VENOUS BLD VENIPUNCTURE: CPT

## 2023-07-24 PROCEDURE — 80178 ASSAY OF LITHIUM: CPT

## 2023-07-24 PROCEDURE — 80053 COMPREHEN METABOLIC PANEL: CPT

## 2023-07-26 ENCOUNTER — TELEPHONE (OUTPATIENT)
Dept: FAMILY MEDICINE CLINIC | Age: 58
End: 2023-07-26

## 2023-07-26 NOTE — TELEPHONE ENCOUNTER
"    Caller: Freedom Stevens \"Toro\"    Relationship to patient: Self    Best call back number: [phone]    Patient is needing: PATIENT CALLED IN AND SAID HE WOULD LIKE A CALL BACK REGARDING WHEN HIS NEXT INR VISIT WILL BE        "

## 2023-07-28 ENCOUNTER — ANTICOAGULATION VISIT (OUTPATIENT)
Dept: FAMILY MEDICINE CLINIC | Age: 58
End: 2023-07-28
Payer: COMMERCIAL

## 2023-07-28 VITALS — SYSTOLIC BLOOD PRESSURE: 133 MMHG | HEART RATE: 92 BPM | DIASTOLIC BLOOD PRESSURE: 82 MMHG

## 2023-07-28 DIAGNOSIS — Z95.2 HX OF AORTIC VALVE REPLACEMENT, MECHANICAL: Primary | ICD-10-CM

## 2023-07-28 LAB — INR PPP: 3.3 (ref 2.5–3.5)

## 2023-07-28 PROCEDURE — 36416 COLLJ CAPILLARY BLOOD SPEC: CPT | Performed by: FAMILY MEDICINE

## 2023-07-28 PROCEDURE — 85610 PROTHROMBIN TIME: CPT | Performed by: FAMILY MEDICINE

## 2023-07-28 PROCEDURE — 93793 ANTICOAG MGMT PT WARFARIN: CPT | Performed by: FAMILY MEDICINE

## 2023-08-17 ENCOUNTER — ANTICOAGULATION VISIT (OUTPATIENT)
Dept: FAMILY MEDICINE CLINIC | Age: 58
End: 2023-08-17
Payer: COMMERCIAL

## 2023-08-17 VITALS — HEART RATE: 88 BPM | SYSTOLIC BLOOD PRESSURE: 122 MMHG | DIASTOLIC BLOOD PRESSURE: 76 MMHG

## 2023-08-17 DIAGNOSIS — Z95.2 HX OF AORTIC VALVE REPLACEMENT, MECHANICAL: Primary | ICD-10-CM

## 2023-08-17 LAB — INR PPP: 2.2 (ref 2.5–3.5)

## 2023-08-17 PROCEDURE — 85610 PROTHROMBIN TIME: CPT | Performed by: FAMILY MEDICINE

## 2023-08-17 PROCEDURE — 36416 COLLJ CAPILLARY BLOOD SPEC: CPT | Performed by: FAMILY MEDICINE

## 2023-08-17 PROCEDURE — 93793 ANTICOAG MGMT PT WARFARIN: CPT | Performed by: FAMILY MEDICINE

## 2023-08-18 DIAGNOSIS — I10 ESSENTIAL HYPERTENSION: ICD-10-CM

## 2023-08-18 RX ORDER — AMLODIPINE BESYLATE 10 MG/1
TABLET ORAL
Qty: 90 TABLET | Refills: 0 | Status: SHIPPED | OUTPATIENT
Start: 2023-08-18

## 2023-08-29 ENCOUNTER — OFFICE VISIT (OUTPATIENT)
Dept: FAMILY MEDICINE CLINIC | Age: 58
End: 2023-08-29
Payer: COMMERCIAL

## 2023-08-29 VITALS
DIASTOLIC BLOOD PRESSURE: 82 MMHG | HEIGHT: 71 IN | SYSTOLIC BLOOD PRESSURE: 119 MMHG | HEART RATE: 82 BPM | WEIGHT: 285 LBS | BODY MASS INDEX: 39.9 KG/M2 | OXYGEN SATURATION: 92 %

## 2023-08-29 DIAGNOSIS — E78.2 MIXED HYPERLIPIDEMIA: ICD-10-CM

## 2023-08-29 DIAGNOSIS — I71.21 ANEURYSM OF ASCENDING AORTA WITHOUT RUPTURE: ICD-10-CM

## 2023-08-29 DIAGNOSIS — I10 ESSENTIAL HYPERTENSION: ICD-10-CM

## 2023-08-29 DIAGNOSIS — E11.9 TYPE 2 DIABETES MELLITUS WITHOUT COMPLICATION, WITHOUT LONG-TERM CURRENT USE OF INSULIN: Primary | ICD-10-CM

## 2023-08-29 DIAGNOSIS — F41.1 GENERALIZED ANXIETY DISORDER: ICD-10-CM

## 2023-08-29 DIAGNOSIS — Z95.2 HX OF AORTIC VALVE REPLACEMENT, MECHANICAL: ICD-10-CM

## 2023-08-29 DIAGNOSIS — F31.61 BIPOLAR DISORDER, CURRENT EPISODE MIXED, MILD: ICD-10-CM

## 2023-08-29 PROBLEM — N20.0 KIDNEY STONE: Status: RESOLVED | Noted: 2022-10-10 | Resolved: 2023-08-29

## 2023-08-29 PROBLEM — B30.9 ACUTE VIRAL CONJUNCTIVITIS OF RIGHT EYE: Status: RESOLVED | Noted: 2023-04-28 | Resolved: 2023-08-29

## 2023-08-29 LAB — INR PPP: 2.6 (ref 2.5–3.5)

## 2023-08-29 RX ORDER — HYDROCHLOROTHIAZIDE 50 MG/1
25 TABLET ORAL DAILY
Qty: 45 TABLET | Refills: 1 | Status: SHIPPED | OUTPATIENT
Start: 2023-08-29

## 2023-08-29 RX ORDER — CARVEDILOL 12.5 MG/1
12.5 TABLET ORAL 2 TIMES DAILY WITH MEALS
Qty: 180 TABLET | Refills: 1 | Status: SHIPPED | OUTPATIENT
Start: 2023-08-29

## 2023-08-29 RX ORDER — LOSARTAN POTASSIUM 25 MG/1
50 TABLET ORAL 2 TIMES DAILY
Qty: 360 TABLET | Refills: 1 | Status: SHIPPED | OUTPATIENT
Start: 2023-08-29

## 2023-08-29 RX ORDER — PRAVASTATIN SODIUM 20 MG
20 TABLET ORAL
Qty: 90 TABLET | Refills: 1 | Status: SHIPPED | OUTPATIENT
Start: 2023-08-29

## 2023-08-29 NOTE — ASSESSMENT & PLAN NOTE
Blood pressure has been running at goal.  Continue amlodipine 10 mg daily, carvedilol 12.5 mg twice daily, HCTZ 25 mg daily and losartan 50 mg twice daily.  Refills were needed today.  Labs were not needed today.  Continue to monitor.

## 2023-08-29 NOTE — PROGRESS NOTES
Chief Complaint  Diabetes (3 month f/u)    Subjective          Freedom Stevens presents to [facility] today for follow-up of routine problems.    He is on metformin for diabetes.  Last A1c 7.1 in May.  He is on a statin, ARB.  He is UTD on foot exam (4/2023).  He is UTD on eye exam, last done 1/2023.     He is currently on carvedilol, hydrochlorothiazide, amlodipine, and losartan for HTN.  His blood pressure has been well controlled.  Denies chest pain, palpitations, or shortness of breath.     He is on pravastatin for HLD.  Denies myalgias.      He is on warfarin for anticoagulation of a mechanical aortic valve. He is following with Dr. [facility]elle[facility].  We are managing the INR.  INR today came back 2.6.     He is following yearly with CT surgery for a 4.7 cm fusiform ascending aortic aneurysm.  He does also follow with Dr. Tellze [facility].  He already has a history of ascending aortic aneurysm repair and aortic valve replacement.      Follows with Dr. Mccauley for bipolar disorder with depressed mood and anxiety.  He is on a regimen of lithium, risperidone, and pramipexole. His symptoms have been pretty well controlled.      He has DORIAN but has been unable to tolerate CPAP.        Current Outpatient Medications:     amLODIPine (NORVASC) 10 MG tablet, Take 1 tablet by mouth once daily, Disp: 90 tablet, Rfl: 0    carvedilol (COREG) 12.5 MG tablet, Take 1 tablet by mouth 2 (Two) Times a Day With Meals., Disp: 180 tablet, Rfl: 1    hydroCHLOROthiazide (HYDRODIURIL) 50 MG tablet, Take 0.5 tablets by mouth Daily., Disp: 45 tablet, Rfl: 1    lithium (ESKALITH) 450 MG CR tablet, Take 1 tablet by mouth 2 (Two) Times a Day., Disp: , Rfl:     losartan (COZAAR) 25 MG tablet, Take 2 tablets by mouth 2 (Two) Times a Day., Disp: 360 tablet, Rfl: 1    metFORMIN (GLUCOPHAGE) 500 MG tablet, Take 1 tablet by mouth once daily, Disp: 90 tablet, Rfl: 1    ondansetron ODT (ZOFRAN-ODT) 4  "MG disintegrating tablet, Place 1 tablet on the tongue Every 8 (Eight) Hours As Needed for Nausea or Vomiting., Disp: 20 tablet, Rfl: 0    pramipexole (MIRAPEX) 0.5 MG tablet, Take 1 tablet by mouth 2 (Two) Times a Day., Disp: , Rfl:     pravastatin (PRAVACHOL) 20 MG tablet, Take 1 tablet by mouth every night at bedtime., Disp: 90 tablet, Rfl: 1    risperiDONE (risperDAL) 3 MG tablet, Take 2 mg by mouth every night at bedtime., Disp: , Rfl:     tamsulosin (FLOMAX) 0.4 MG capsule 24 hr capsule, Take 1 capsule by mouth Daily for 360 days., Disp: 90 capsule, Rfl: 3    trimethoprim-polymyxin b (Polytrim) 53555-2.1 UNIT/ML-% ophthalmic solution, Administer 1 drop to the right eye 3 (Three) Times a Day., Disp: 10 mL, Rfl: 0    warfarin (COUMADIN) 1 MG tablet, TAKE 1 TABLET BY MOUTH ONCE DAILY AT NIGHT, Disp: 90 tablet, Rfl: 0    warfarin (COUMADIN) 5 MG tablet, TAKE 1 TABLET BY MOUTH ONCE DAILY AT NIGHT, Disp: 90 tablet, Rfl: 0    Allergies:  Shellfish-derived products      Objective   Vital Signs:   Vitals:    08/29/23 1000   BP: 119/82   BP Location: Left arm   Patient Position: Sitting   Cuff Size: Large Adult   Pulse: 82   SpO2: 92%   Weight: 129 kg (285 lb)   Height: 180.3 cm (70.98\")       Physical Exam  Vitals reviewed.   Constitutional:       General: He is not in acute distress.     Appearance: Normal appearance. He is well-developed.   HENT:      Head: Normocephalic and atraumatic.      Right Ear: External ear normal.      Left Ear: External ear normal.   Eyes:      Extraocular Movements: Extraocular movements intact.      Pupils: Pupils are equal, round, and reactive to light.   Cardiovascular:      Rate and Rhythm: Normal rate and regular rhythm.      Heart sounds: No murmur heard.     Comments: +click  Pulmonary:      Effort: Pulmonary effort is normal.      Breath sounds: Normal breath sounds. No wheezing, rhonchi or rales.   Abdominal:      General: Bowel sounds are normal. There is no distension.      " Palpations: Abdomen is soft.      Tenderness: There is no abdominal tenderness.   Musculoskeletal:         General: Normal range of motion.   Neurological:      Mental Status: He is alert.   Psychiatric:         Mood and Affect: Affect normal.         Lab Results   Component Value Date    GLUCOSE 211 (H) 07/24/2023    BUN 11 07/24/2023    CREATININE 0.97 07/24/2023    EGFRIFNONA 70 12/07/2021    EGFRIFAFRI 92 01/08/2018    BCR 11.3 07/24/2023    K 4.3 07/24/2023    CO2 25.0 07/24/2023    CALCIUM 9.6 07/24/2023    ALBUMIN 4.6 07/24/2023    LABIL2 1.5 03/11/2021    AST 51 (H) 07/24/2023    ALT 43 (H) 07/24/2023       Lab Results   Component Value Date    CHOL 125 04/28/2023    CHLPL 125 03/11/2021    TRIG 168 (H) 04/28/2023    HDL 32 (L) 04/28/2023    LDL 64 04/28/2023            Assessment and Plan    Diagnoses and all orders for this visit:    1. Type 2 diabetes mellitus without complication, without long-term current use of insulin (Primary)  Assessment & Plan:  He is on metformin for diabetes.  No refills needed today.  Last A1c 7.1 in May.  He is on a statin, ARB.  He is UTD on foot exam (4/2023).  He is UTD on eye exam, last done 1/2023.       2. Essential hypertension  Assessment & Plan:  Blood pressure has been running at goal.  Continue amlodipine 10 mg daily, carvedilol 12.5 mg twice daily, HCTZ 25 mg daily and losartan 50 mg twice daily.  Refills were needed today.  Labs were not needed today.  Continue to monitor.      Orders:  -     carvedilol (COREG) 12.5 MG tablet; Take 1 tablet by mouth 2 (Two) Times a Day With Meals.  Dispense: 180 tablet; Refill: 1  -     hydroCHLOROthiazide (HYDRODIURIL) 50 MG tablet; Take 0.5 tablets by mouth Daily.  Dispense: 45 tablet; Refill: 1  -     losartan (COZAAR) 25 MG tablet; Take 2 tablets by mouth 2 (Two) Times a Day.  Dispense: 360 tablet; Refill: 1    3. Mixed hyperlipidemia  Assessment & Plan:  Stable on pravastatin 20 mg daily.  Refills were needed today.  Labs were  not due today.  Continue to monitor.      Orders:  -     pravastatin (PRAVACHOL) 20 MG tablet; Take 1 tablet by mouth every night at bedtime.  Dispense: 90 tablet; Refill: 1    4. Aneurysm of ascending aorta without rupture  Overview:  He was evaluated by CT Surgery who recommended routine follow-up in 1 year.  They over read the CT report as a fusiform ascending aortic aneurysm of 4.5 as opposed to 4.7 cm.      5. Bipolar disorder, current episode mixed, mild  Overview:  Following with Dr. Mccauley.  Stable on lithium, risperidone, and pramipexole.       6. Generalized anxiety disorder  Overview:  With Dr. Mccauley.  As per bipolar plan.      7. Hx of aortic valve replacement, mechanical  -     POC INR        Follow Up   Return in about 4 months (around 12/29/2023) for Annual physical.  Patient was given instructions and counseling regarding his condition or for health maintenance advice. Please see specific information pulled into the AVS if appropriate.           04/28/2023

## 2023-08-29 NOTE — ASSESSMENT & PLAN NOTE
He is on metformin for diabetes.  No refills needed today.  Last A1c 7.1 in May.  He is on a statin, ARB.  He is UTD on foot exam (4/2023).  He is UTD on eye exam, last done 1/2023.

## 2023-08-29 NOTE — ASSESSMENT & PLAN NOTE
Stable on pravastatin 20 mg daily.  Refills were needed today.  Labs were not due today.  Continue to monitor.

## 2023-09-21 ENCOUNTER — ANTICOAGULATION VISIT (OUTPATIENT)
Dept: FAMILY MEDICINE CLINIC | Age: 58
End: 2023-09-21
Payer: COMMERCIAL

## 2023-09-21 DIAGNOSIS — Z95.2 HX OF AORTIC VALVE REPLACEMENT, MECHANICAL: Primary | ICD-10-CM

## 2023-09-21 LAB — INR PPP: 2.5 (ref 2.5–3.5)

## 2023-09-21 PROCEDURE — 93793 ANTICOAG MGMT PT WARFARIN: CPT | Performed by: FAMILY MEDICINE

## 2023-09-21 PROCEDURE — 36416 COLLJ CAPILLARY BLOOD SPEC: CPT | Performed by: FAMILY MEDICINE

## 2023-09-21 PROCEDURE — 85610 PROTHROMBIN TIME: CPT | Performed by: FAMILY MEDICINE

## 2023-10-16 DIAGNOSIS — N13.8 BPH WITH OBSTRUCTION/LOWER URINARY TRACT SYMPTOMS: ICD-10-CM

## 2023-10-16 DIAGNOSIS — I10 ESSENTIAL HYPERTENSION: ICD-10-CM

## 2023-10-16 DIAGNOSIS — N40.1 BPH WITH OBSTRUCTION/LOWER URINARY TRACT SYMPTOMS: ICD-10-CM

## 2023-10-16 RX ORDER — TAMSULOSIN HYDROCHLORIDE 0.4 MG/1
1 CAPSULE ORAL DAILY
Qty: 90 CAPSULE | Refills: 0 | Status: SHIPPED | OUTPATIENT
Start: 2023-10-16 | End: 2023-10-18 | Stop reason: SDUPTHER

## 2023-10-17 RX ORDER — WARFARIN SODIUM 5 MG/1
TABLET ORAL
Qty: 90 TABLET | Refills: 1 | Status: SHIPPED | OUTPATIENT
Start: 2023-10-17

## 2023-10-17 RX ORDER — WARFARIN SODIUM 1 MG/1
TABLET ORAL
Qty: 90 TABLET | Refills: 1 | Status: SHIPPED | OUTPATIENT
Start: 2023-10-17

## 2023-10-17 RX ORDER — HYDROCHLOROTHIAZIDE 50 MG/1
50 TABLET ORAL DAILY
Qty: 90 TABLET | Refills: 1 | Status: SHIPPED | OUTPATIENT
Start: 2023-10-17

## 2023-10-18 ENCOUNTER — OFFICE VISIT (OUTPATIENT)
Dept: UROLOGY | Facility: CLINIC | Age: 58
End: 2023-10-18
Payer: COMMERCIAL

## 2023-10-18 VITALS
BODY MASS INDEX: 39.9 KG/M2 | HEIGHT: 71 IN | DIASTOLIC BLOOD PRESSURE: 75 MMHG | SYSTOLIC BLOOD PRESSURE: 128 MMHG | HEART RATE: 88 BPM | WEIGHT: 285 LBS

## 2023-10-18 DIAGNOSIS — Q61.9 CONGENITAL CYSTIC KIDNEY DISEASE: Primary | ICD-10-CM

## 2023-10-18 DIAGNOSIS — N40.1 BPH WITH OBSTRUCTION/LOWER URINARY TRACT SYMPTOMS: ICD-10-CM

## 2023-10-18 DIAGNOSIS — N13.8 BPH WITH OBSTRUCTION/LOWER URINARY TRACT SYMPTOMS: ICD-10-CM

## 2023-10-18 LAB
BILIRUB BLD-MCNC: NEGATIVE MG/DL
CLARITY, POC: CLEAR
COLOR UR: YELLOW
EXPIRATION DATE: ABNORMAL
GLUCOSE UR STRIP-MCNC: ABNORMAL MG/DL
KETONES UR QL: ABNORMAL
LEUKOCYTE EST, POC: NEGATIVE
Lab: ABNORMAL
NITRITE UR-MCNC: NEGATIVE MG/ML
PH UR: 6.5 [PH] (ref 5–8)
PROT UR STRIP-MCNC: ABNORMAL MG/DL
RBC # UR STRIP: ABNORMAL /UL
SP GR UR: 1.01 (ref 1–1.03)
UROBILINOGEN UR QL: ABNORMAL

## 2023-10-18 RX ORDER — RISPERIDONE 2 MG/1
2 TABLET ORAL DAILY
COMMUNITY
Start: 2023-10-16

## 2023-10-18 RX ORDER — TAMSULOSIN HYDROCHLORIDE 0.4 MG/1
1 CAPSULE ORAL DAILY
Qty: 90 CAPSULE | Refills: 3 | Status: SHIPPED | OUTPATIENT
Start: 2023-10-18 | End: 2024-10-12

## 2023-10-18 NOTE — PROGRESS NOTES
"Chief Complaint  Congenital cystic kidney disease    Subjective          Freedom Stevens presents to [facility]    History of Present Illness  Patient is here for follow-up of polycystic kidney disease.  His creatinine has been normal and is checked every 6 months.     He recently had a repeat CT scan to follow-up on his polycystic kidney disease.  CT scan findings are below.     The patient has polycystic kidney disease and is here for a follow-up. Recent CT scan reveals no evidence of enhancing renal masses.      He reports he sees a nephrologist.    He only complains today of some post-void dribbling.  He states his stream is good and continues on his Flomax.        Objective   Vital Signs:   /75 (BP Location: Right arm, Patient Position: Sitting, Cuff Size: Adult)   Pulse 88   Ht 180.3 cm (70.98\")   Wt 129 kg (285 lb)   BMI 39.77 kg/m²       Physical Exam  Vitals and nursing note reviewed.   Constitutional:       Appearance: Normal appearance. He is well-developed.   Pulmonary:      Effort: Pulmonary effort is normal.      Breath sounds: Normal air entry.   Neurological:      Mental Status: He is alert and oriented to person, place, and time.      Motor: Motor function is intact.   Psychiatric:         Mood and Affect: Mood normal.         Behavior: Behavior normal.          Result Review :   The following data was reviewed by: Ella Agrawal MD on 10/18/2023:    Results for orders placed or performed in visit on 10/18/23   POC Urinalysis Dipstick, Automated    Specimen: Urine   Result Value Ref Range    Color Yellow Yellow, Straw, Dark Yellow, Holli    Clarity, UA Clear Clear    Specific Gravity  1.010 1.005 - 1.030    pH, Urine 6.5 5.0 - 8.0    Leukocytes Negative Negative    Nitrite, UA Negative Negative    Protein, POC 30 mg/dL (A) Negative mg/dL    Glucose, UA >=1000 mg/dL (3+) (A) Negative mg/dL    Ketones, UA Trace (A) Negative    Urobilinogen, UA 0.2 E.U./dL " Normal, 0.2 E.U./dL    Bilirubin Negative Negative    Blood, UA Trace (A) Negative    Lot Number [id number]     Expiration Date 9/2,024            Data reviewed : Radiologic studies CT scan:    Study Result    Narrative & Impression   PROCEDURE:  CT ABDOMEN PELVIS W CONTRAST     COMPARISON: [facility], CT, CT ABDOMEN W WO CONTRAST, 8/31/2022, 14:46.     INDICATIONS:  Abdominal pain, acute, nonlocalized     TECHNIQUE:    After obtaining the patient's consent, CT images were created with non-ionic intravenous   contrast material.       PROTOCOL:     Standard imaging protocol performed                 RADIATION:      DLP: 2111.7 mGy*cm               Automated exposure control was utilized to minimize radiation dose.   CONTRAST:      100 cc Isovue 370 I.V.  LABS:   eGFR: 69 ml/min/1.73m2     FINDINGS:          The lung bases are clear.  There is postoperative change from midline sternotomy.  There is a   prosthetic aortic valve in place.     There are clips from cholecystectomy.  There is diffuse fatty infiltration of the liver.  The   bilateral adrenal glands, pancreas and spleen are normal.  There is a nonobstructing 4 mm stone of   the upper pole left kidney.  There is a 3 mm stone of the lower pole left kidney.  There is a 2 mm   stone of the interpolar posterior right kidney.  There are bilateral renal cysts.     There is no acute inflammatory change of the abdomen or pelvis.  There is a large amount of stool   throughout the colon with stool to the cecum.  The patient has a very redundant sigmoid colon.    There is mild degenerative change of the lumbar spine.     IMPRESSION:                 1. Fatty infiltration of the liver.  2. Nonobstructing bilateral nephrolithiasis.  3. Bilateral renal cysts.  4. Moderate stool burden consistent with constipation.            LINDA NUNEZ MD         Electronically Signed and Approved By: LINDA NUNEZ MD on 6/27/2023 at 3:21               Assessment and Plan     Diagnoses and all orders for this visit:    1. Congenital cystic kidney disease (Primary)  -     POC Urinalysis Dipstick, Automated  -     CT Abdomen Pelvis With & Without Contrast; Future    2. BPH with obstruction/lower urinary tract symptoms  -     tamsulosin (FLOMAX) 0.4 MG capsule 24 hr capsule; Take 1 capsule by mouth Daily for 360 days.  Dispense: 90 capsule; Refill: 3    Follow-up in 1 year with a CT scan prior.  Continue Flomax.        Follow Up       No follow-ups on file.  Patient was given instructions and counseling regarding his condition or for health maintenance advice. Please see specific information pulled into the AVS if appropriate.

## 2023-10-19 ENCOUNTER — ANTICOAGULATION VISIT (OUTPATIENT)
Dept: FAMILY MEDICINE CLINIC | Age: 58
End: 2023-10-19
Payer: COMMERCIAL

## 2023-10-19 VITALS — HEART RATE: 76 BPM | DIASTOLIC BLOOD PRESSURE: 76 MMHG | SYSTOLIC BLOOD PRESSURE: 123 MMHG

## 2023-10-19 DIAGNOSIS — Z95.2 HX OF AORTIC VALVE REPLACEMENT, MECHANICAL: Primary | ICD-10-CM

## 2023-10-19 LAB — INR PPP: 2.1 (ref 2.5–3.5)

## 2023-10-19 PROCEDURE — 85610 PROTHROMBIN TIME: CPT | Performed by: FAMILY MEDICINE

## 2023-10-19 PROCEDURE — 36416 COLLJ CAPILLARY BLOOD SPEC: CPT | Performed by: FAMILY MEDICINE

## 2023-10-19 PROCEDURE — 93793 ANTICOAG MGMT PT WARFARIN: CPT | Performed by: FAMILY MEDICINE

## 2023-10-23 ENCOUNTER — TELEPHONE (OUTPATIENT)
Dept: FAMILY MEDICINE CLINIC | Age: 58
End: 2023-10-23
Payer: COMMERCIAL

## 2023-10-23 RX ORDER — BLOOD SUGAR DIAGNOSTIC
1 STRIP MISCELLANEOUS DAILY
Qty: 300 STRIP | Refills: 1 | Status: SHIPPED | OUTPATIENT
Start: 2023-10-23

## 2023-10-23 RX ORDER — BLOOD-GLUCOSE METER
1 KIT MISCELLANEOUS DAILY
Qty: 1 EACH | Refills: 0 | Status: SHIPPED | OUTPATIENT
Start: 2023-10-23

## 2023-10-23 RX ORDER — LANCETS 23 GAUGE
1 EACH MISCELLANEOUS DAILY
COMMUNITY
End: 2023-10-23 | Stop reason: SDUPTHER

## 2023-10-23 RX ORDER — LANCETS 23 GAUGE
1 EACH MISCELLANEOUS DAILY
Qty: 300 EACH | Refills: 1 | Status: SHIPPED | OUTPATIENT
Start: 2023-10-23

## 2023-10-23 RX ORDER — BLOOD-GLUCOSE METER
1 KIT MISCELLANEOUS DAILY
COMMUNITY
End: 2023-10-23 | Stop reason: SDUPTHER

## 2023-10-23 NOTE — TELEPHONE ENCOUNTER
Pt called stating he felt funny this am and was at his friends house who checked his BS and it was 504.  He called me to see if he needed to take more metformin.  I informed him he needed to go to the ER for an eval and some insulin to get his BS down.  He then informed me that his friend was on insulin and gave him a shot of insulin as well.  I asked him what type of insulin his friend was on and he is on Levemier.  Toro states he was unaware that there were different kinds of insulin.  I informed him that Levemier was a long acting insulin and that he still needed to go to ER.  He took 25units of his friends Vera.  I told him to inform the ER of that as well. He is going to go to [facility] ER.  RX for Blood Glucose meter sent to [facility].

## 2023-10-26 ENCOUNTER — ANTICOAGULATION VISIT (OUTPATIENT)
Dept: FAMILY MEDICINE CLINIC | Age: 58
End: 2023-10-26
Payer: COMMERCIAL

## 2023-10-26 ENCOUNTER — OFFICE VISIT (OUTPATIENT)
Dept: FAMILY MEDICINE CLINIC | Age: 58
End: 2023-10-26
Payer: COMMERCIAL

## 2023-10-26 VITALS
HEART RATE: 72 BPM | SYSTOLIC BLOOD PRESSURE: 135 MMHG | HEIGHT: 71 IN | DIASTOLIC BLOOD PRESSURE: 73 MMHG | OXYGEN SATURATION: 96 % | BODY MASS INDEX: 39.62 KG/M2 | WEIGHT: 283 LBS

## 2023-10-26 VITALS — HEART RATE: 69 BPM | DIASTOLIC BLOOD PRESSURE: 80 MMHG | SYSTOLIC BLOOD PRESSURE: 137 MMHG

## 2023-10-26 DIAGNOSIS — Z95.2 HX OF AORTIC VALVE REPLACEMENT, MECHANICAL: Primary | ICD-10-CM

## 2023-10-26 DIAGNOSIS — G47.33 OBSTRUCTIVE SLEEP APNEA SYNDROME: ICD-10-CM

## 2023-10-26 DIAGNOSIS — Z95.2 HX OF AORTIC VALVE REPLACEMENT, MECHANICAL: ICD-10-CM

## 2023-10-26 DIAGNOSIS — Z23 ENCOUNTER FOR IMMUNIZATION: ICD-10-CM

## 2023-10-26 DIAGNOSIS — E11.9 TYPE 2 DIABETES MELLITUS WITHOUT COMPLICATION, WITHOUT LONG-TERM CURRENT USE OF INSULIN: Primary | ICD-10-CM

## 2023-10-26 LAB — INR PPP: 1.2 (ref 2.5–3.5)

## 2023-10-26 PROCEDURE — 36416 COLLJ CAPILLARY BLOOD SPEC: CPT | Performed by: FAMILY MEDICINE

## 2023-10-26 PROCEDURE — 85610 PROTHROMBIN TIME: CPT | Performed by: FAMILY MEDICINE

## 2023-10-26 PROCEDURE — 93793 ANTICOAG MGMT PT WARFARIN: CPT | Performed by: FAMILY MEDICINE

## 2023-10-26 RX ORDER — BLOOD-GLUCOSE METER
EACH MISCELLANEOUS
COMMUNITY
Start: 2023-10-23

## 2023-10-26 RX ORDER — LIRAGLUTIDE 6 MG/ML
0.6 INJECTION SUBCUTANEOUS DAILY
Qty: 3 ML | Refills: 1 | Status: SHIPPED | OUTPATIENT
Start: 2023-10-26

## 2023-10-26 NOTE — PROGRESS NOTES
"Chief Complaint  Hospital Follow Up Visit and Hyperglycemia    Subjective          Freedom Stevens presents to [facility] today for follow-up after presenting to the [facility] ED on 10/23/2023 for hyperglycemia.  His wife Maria Elena accompanies him today.    He checked his sugar at home and it was over 500 so his father-in-law gave him 22 units of his own Levemir.  He was complaining of visual changes and leg pain at the time of presentation.  Blood sugar had come down to 200 upon arrival to the ED.  He was given an additional 5 units of regular insulin at that time.    He is on metformin 500 mg daily and is taking that regularly as prescribed.  Historically he has been quite well controlled, with his last A1c being 7.1 back in June.  This was his first A1c over 7 in the past 2 years worth of data.  However, since getting out of the hospital, he has been checking BG and it has routinely been running in the 200-300s.  He is interested in trying a GLP-1 agonist (and eventually coming off the metformin).    He notes that his left foot has been \"black and blue\" for the past 3 years    INR did come back today at 1.2.  He does think he might have missed Wed and Thurs of last week.  (He ran out of the 5 mg tabs and was in a manic episode.)      Current Outpatient Medications:     amLODIPine (NORVASC) 10 MG tablet, Take 1 tablet by mouth once daily, Disp: 90 tablet, Rfl: 0    Blood Glucose Monitoring Suppl (Accu-Chek Guide Me) w/Device kit, USE 1 UNIT TO CHECK GLUCOSE ONCE DAILY, Disp: , Rfl:     carvedilol (COREG) 12.5 MG tablet, Take 1 tablet by mouth 2 (Two) Times a Day With Meals., Disp: 180 tablet, Rfl: 1    Glucose Blood (Blood Glucose Test Strips 333) strip, Use 1 each Daily. DX E11.9, Disp: 300 strip, Rfl: 1    glucose monitor monitoring kit, Use 1 each Daily. DX E11.9, Disp: 1 each, Rfl: 0    hydroCHLOROthiazide (HYDRODIURIL) 50 MG tablet, Take 1 tablet by mouth once daily, Disp: 90 " "tablet, Rfl: 1    Lancets 28G misc, Use 1 each Daily. DX E11.9, Disp: 300 each, Rfl: 1    lithium (ESKALITH) 450 MG CR tablet, Take 1 tablet by mouth 2 (Two) Times a Day., Disp: , Rfl:     losartan (COZAAR) 25 MG tablet, Take 2 tablets by mouth 2 (Two) Times a Day., Disp: 360 tablet, Rfl: 1    metFORMIN (GLUCOPHAGE) 500 MG tablet, Take 1 tablet by mouth Daily., Disp: 90 tablet, Rfl: 1    ondansetron ODT (ZOFRAN-ODT) 4 MG disintegrating tablet, Place 1 tablet on the tongue Every 8 (Eight) Hours As Needed for Nausea or Vomiting., Disp: 20 tablet, Rfl: 0    pramipexole (MIRAPEX) 0.5 MG tablet, Take 1 tablet by mouth 2 (Two) Times a Day., Disp: , Rfl:     pravastatin (PRAVACHOL) 20 MG tablet, Take 1 tablet by mouth every night at bedtime., Disp: 90 tablet, Rfl: 1    risperiDONE (risperDAL) 2 MG tablet, Take 1 tablet by mouth Daily., Disp: , Rfl:     tamsulosin (FLOMAX) 0.4 MG capsule 24 hr capsule, Take 1 capsule by mouth Daily for 360 days., Disp: 90 capsule, Rfl: 3    warfarin (COUMADIN) 1 MG tablet, TAKE 1 TABLET BY MOUTH ONCE DAILY AT NIGHT, Disp: 90 tablet, Rfl: 1    warfarin (COUMADIN) 5 MG tablet, TAKE 1 TABLET BY MOUTH ONCE DAILY AT NIGHT, Disp: 90 tablet, Rfl: 1    Liraglutide (Victoza) 18 MG/3ML solution pen-injector injection, Inject 0.6 mg under the skin into the appropriate area as directed Daily., Disp: 3 mL, Rfl: 1  Medications Discontinued During This Encounter   Medication Reason    metFORMIN (GLUCOPHAGE) 500 MG tablet Reorder         Allergies:  Shellfish-derived products      Objective   Vital Signs:   Vitals:    10/26/23 1554   BP: 135/73   BP Location: Left arm   Patient Position: Sitting   Cuff Size: Large Adult   Pulse: 72   SpO2: 96%   Weight: 128 kg (283 lb)   Height: 180.3 cm (70.98\")         Physical Exam  Vitals reviewed.   Constitutional:       General: He is not in acute distress.     Appearance: Normal appearance. He is well-developed.   HENT:      Head: Normocephalic and atraumatic.      " Right Ear: External ear normal.      Left Ear: External ear normal.   Eyes:      Extraocular Movements: Extraocular movements intact.      Conjunctiva/sclera: Conjunctivae normal.      Pupils: Pupils are equal, round, and reactive to light.   Cardiovascular:      Rate and Rhythm: Normal rate and regular rhythm.      Heart sounds: No murmur heard.  Pulmonary:      Effort: Pulmonary effort is normal.      Breath sounds: Normal breath sounds. No wheezing, rhonchi or rales.   Abdominal:      General: Bowel sounds are normal. There is no distension.      Palpations: Abdomen is soft.      Tenderness: There is no abdominal tenderness.   Musculoskeletal:         General: Normal range of motion.   Skin:     Findings: Bruising (Purple ecchymosis noted to the left medial heel) present.   Neurological:      Mental Status: He is alert.   Psychiatric:         Mood and Affect: Affect normal.           Lab Results   Component Value Date    GLUCOSE 211 (H) 07/24/2023    BUN 11 07/24/2023    CREATININE 0.97 07/24/2023    EGFRIFNONA 70 12/07/2021    EGFRIFAFRI 92 01/08/2018    BCR 11.3 07/24/2023    K 4.3 07/24/2023    CO2 25.0 07/24/2023    CALCIUM 9.6 07/24/2023    ALBUMIN 4.6 07/24/2023    LABIL2 1.5 03/11/2021    AST 51 (H) 07/24/2023    ALT 43 (H) 07/24/2023       Lab Results   Component Value Date    CHOL 125 04/28/2023    CHLPL 125 03/11/2021    TRIG 168 (H) 04/28/2023    HDL 32 (L) 04/28/2023    LDL 64 04/28/2023            Assessment and Plan    Diagnoses and all orders for this visit:    1. Type 2 diabetes mellitus without complication, without long-term current use of insulin (Primary)  Assessment & Plan:  Blood sugars running uncharacteristically high for him.  He has been historically extremely well controlled on metformin 500 mg daily alone.  He is interested in starting a GLP-1 agonist, so we are going to try him on some Victoza, starting at 0.6 mg weekly.  I have sent in the prescription today as below.  Continue  metformin for now but he would like to eventually get off of this if we can get the blood sugar back under good control with the Victoza alone.  I will see him back in December and we will plan to uptitrate between then and now as indicated.  Adverse effects profile was discussed with him and his wife.    Orders:  -     metFORMIN (GLUCOPHAGE) 500 MG tablet; Take 1 tablet by mouth Daily.  Dispense: 90 tablet; Refill: 1  -     Liraglutide (Victoza) 18 MG/3ML solution pen-injector injection; Inject 0.6 mg under the skin into the appropriate area as directed Daily.  Dispense: 3 mL; Refill: 1  -     Ambulatory Referral to Diabetic Education    2. Obstructive sleep apnea syndrome  Overview:  Unable to tolerate CPAP.    Assessment & Plan:  Referral to Sleep Medicine was placed back in the spring but it looks like he may have missed the appointment that was made for him in March at [facility].  Referral is really been reentered today.    Orders:  -     Ambulatory Referral to Sleep Medicine    3. Encounter for immunization  -     Fluzone (or Fluarix & Flulaval for VFC) >6mos  -     Pneumococcal Conjugate Vaccine 20-Valent All    4. Hx of aortic valve replacement, mechanical  Assessment & Plan:  INR is low today at 1.2, also uncharacteristically.  Maria Elena notes that Toro recently went through somewhat of a manic spell after stopping his Risperdal and he did have at least 2 days last week in which he missed his Coumadin.  We are going to continue him on his current regimen and recheck INR in 1 week.  Goal range is 2.5-3.5.  He does have bruising on the left heel; reassurance provided.          Follow Up   No follow-ups on file.  Patient was given instructions and counseling regarding his condition or for health maintenance advice. Please see specific information pulled into the AVS if appropriate.           10/26/2023

## 2023-10-26 NOTE — ASSESSMENT & PLAN NOTE
INR is low today at 1.2, also uncharacteristically.  Maria Elena notes that Toro recently went through somewhat of a manic spell after stopping his Risperdal and he did have at least 2 days last week in which he missed his Coumadin.  We are going to continue him on his current regimen and recheck INR in 1 week.  Goal range is 2.5-3.5.  He does have bruising on the left heel; reassurance provided.

## 2023-10-26 NOTE — ASSESSMENT & PLAN NOTE
Blood sugars running uncharacteristically high for him.  He has been historically extremely well controlled on metformin 500 mg daily alone.  He is interested in starting a GLP-1 agonist, so we are going to try him on some Victoza, starting at 0.6 mg weekly.  I have sent in the prescription today as below.  Continue metformin for now but he would like to eventually get off of this if we can get the blood sugar back under good control with the Victoza alone.  I will see him back in December and we will plan to uptitrate between then and now as indicated.  Adverse effects profile was discussed with him and his wife.

## 2023-10-26 NOTE — ASSESSMENT & PLAN NOTE
Referral to Sleep Medicine was placed back in the spring but it looks like he may have missed the appointment that was made for him in March at [facility].  Referral is really been reentered today.

## 2023-10-26 NOTE — PATIENT INSTRUCTIONS
Diabetes Mellitus and Nutrition, Adult  When you have diabetes, or diabetes mellitus, it is very important to have healthy eating habits because your blood sugar (glucose) levels are greatly affected by what you eat and drink. Eating healthy foods in the right amounts, at about the same times every day, can help you:  Manage your blood glucose.  Lower your risk of heart disease.  Improve your blood pressure.  Reach or maintain a healthy weight.  What can affect my meal plan?  Every person with diabetes is different, and each person has different needs for a meal plan. Your health care provider may recommend that you work with a dietitian to make a meal plan that is best for you. Your meal plan may vary depending on factors such as:  The calories you need.  The medicines you take.  Your weight.  Your blood glucose, blood pressure, and cholesterol levels.  Your activity level.  Other health conditions you have, such as heart or kidney disease.  How do carbohydrates affect me?  Carbohydrates, also called carbs, affect your blood glucose level more than any other type of food. Eating carbs raises the amount of glucose in your blood.  It is important to know how many carbs you can safely have in each meal. This is different for every person. Your dietitian can help you calculate how many carbs you should have at each meal and for each snack.  How does alcohol affect me?  Alcohol can cause a decrease in blood glucose (hypoglycemia), especially if you use insulin or take certain diabetes medicines by mouth. Hypoglycemia can be a life-threatening condition. Symptoms of hypoglycemia, such as sleepiness, dizziness, and confusion, are similar to symptoms of having too much alcohol.  Do not drink alcohol if:  Your health care provider tells you not to drink.  You are pregnant, may be pregnant, or are planning to become pregnant.  If you drink alcohol:  Limit how much you have to:  0-1 drink a day for women.  0-2 drinks a day  "for men.  Know how much alcohol is in your drink. In the [place] one drink equals one 12 oz bottle of beer (355 mL), one 5 oz glass of wine (148 mL), or one 1½ oz glass of hard liquor (44 mL).  Keep yourself hydrated with water, diet soda, or unsweetened iced tea. Keep in mind that regular soda, juice, and other mixers may contain a lot of sugar and must be counted as carbs.  What are tips for following this plan?  Reading food labels  Start by checking the serving size on the Nutrition Facts label of packaged foods and drinks. The number of calories and the amount of carbs, fats, and other nutrients listed on the label are based on one serving of the item. Many items contain more than one serving per package.  Check the total grams (g) of carbs in one serving.  Check the number of grams of saturated fats and trans fats in one serving. Choose foods that have a low amount or none of these fats.  Check the number of milligrams (mg) of salt (sodium) in one serving. Most people should limit total sodium intake to less than 2,300 mg per day.  Always check the nutrition information of foods labeled as \"low-fat\" or \"nonfat.\" These foods may be higher in added sugar or refined carbs and should be avoided.  Talk to your dietitian to identify your daily goals for nutrients listed on the label.  Shopping  Avoid buying canned, pre-made, or processed foods. These foods tend to be high in fat, sodium, and added sugar.  Shop around the outside edge of the grocery store. This is where you will most often find fresh fruits and vegetables, bulk grains, fresh meats, and fresh dairy products.  Cooking  Use low-heat cooking methods, such as baking, instead of high-heat cooking methods, such as deep frying.  Cook using healthy oils, such as olive, canola, or sunflower oil.  Avoid cooking with butter, cream, or high-fat meats.  Meal planning  Eat meals and snacks regularly, preferably at the same times every day. Avoid going long periods of " time without eating.  Eat foods that are high in fiber, such as fresh fruits, vegetables, beans, and whole grains.  Eat 4-6 oz (112-168 g) of lean protein each day, such as lean meat, chicken, fish, eggs, or tofu. One ounce (oz) (28 g) of lean protein is equal to:  1 oz (28 g) of meat, chicken, or fish.  1 egg.  ¼ cup (62 g) of tofu.  Eat some foods each day that contain healthy fats, such as avocado, nuts, seeds, and fish.  What foods should I eat?  Fruits  Berries. Apples. Oranges. Peaches. Apricots. Plums. Grapes. Mangoes. Papayas. Pomegranates. Kiwi. Cherries.  Vegetables  Leafy greens, including lettuce, spinach, kale, chard, nena greens, mustard greens, and cabbage. Beets. Cauliflower. Broccoli. Carrots. Green beans. Tomatoes. Peppers. Onions. Cucumbers. [place] sprouts.  Grains  Whole grains, such as whole-wheat or whole-grain bread, crackers, tortillas, cereal, and pasta. Unsweetened oatmeal. Quinoa. Brown or wild rice.  Meats and other proteins  Seafood. Poultry without skin. Lean cuts of poultry and beef. Tofu. Nuts. Seeds.  Dairy  Low-fat or fat-free dairy products such as milk, yogurt, and cheese.  The items listed above may not be a complete list of foods and beverages you can eat and drink. Contact a dietitian for more information.  What foods should I avoid?  Fruits  Fruits canned with syrup.  Vegetables  Canned vegetables. Frozen vegetables with butter or cream sauce.  Grains  Refined white flour and flour products such as bread, pasta, snack foods, and cereals. Avoid all processed foods.  Meats and other proteins  Fatty cuts of meat. Poultry with skin. Breaded or fried meats. Processed meat. Avoid saturated fats.  Dairy  Full-fat yogurt, cheese, or milk.  Beverages  Sweetened drinks, such as soda or iced tea.  The items listed above may not be a complete list of foods and beverages you should avoid. Contact a dietitian for more information.  Questions to ask a health care provider  Do I need to  meet with a certified diabetes care and ?  Do I need to meet with a dietitian?  What number can I call if I have questions?  When are the best times to check my blood glucose?  Where to find more information:  American Diabetes Association: diabetes.org  Academy of Nutrition and Dietetics: eatright.org  National [place] of Diabetes and Digestive and Kidney Diseases: niddk.nih.gov  Association of Diabetes Care & Education Specialists: diabeteseducator.org  Summary  It is important to have healthy eating habits because your blood sugar (glucose) levels are greatly affected by what you eat and drink. It is important to use alcohol carefully.  A healthy meal plan will help you manage your blood glucose and lower your risk of heart disease.  Your health care provider may recommend that you work with a dietitian to make a meal plan that is best for you.  This information is not intended to replace advice given to you by your health care provider. Make sure you discuss any questions you have with your health care provider.  Document Revised: 07/21/2021 Document Reviewed: 07/21/2021  Elsevier Patient Education © 2022 Elsevier Inc.

## 2023-11-02 ENCOUNTER — ANTICOAGULATION VISIT (OUTPATIENT)
Dept: FAMILY MEDICINE CLINIC | Age: 58
End: 2023-11-02
Payer: COMMERCIAL

## 2023-11-02 VITALS — SYSTOLIC BLOOD PRESSURE: 121 MMHG | DIASTOLIC BLOOD PRESSURE: 71 MMHG | HEART RATE: 71 BPM

## 2023-11-02 DIAGNOSIS — Z95.2 HX OF AORTIC VALVE REPLACEMENT, MECHANICAL: Primary | ICD-10-CM

## 2023-11-02 LAB — INR PPP: 1.5 (ref 2.5–3.5)

## 2023-11-02 PROCEDURE — 93793 ANTICOAG MGMT PT WARFARIN: CPT | Performed by: FAMILY MEDICINE

## 2023-11-02 PROCEDURE — 36416 COLLJ CAPILLARY BLOOD SPEC: CPT | Performed by: FAMILY MEDICINE

## 2023-11-02 PROCEDURE — 85610 PROTHROMBIN TIME: CPT | Performed by: FAMILY MEDICINE

## 2023-11-02 RX ORDER — WARFARIN SODIUM 1 MG/1
1 TABLET ORAL NIGHTLY
Qty: 90 TABLET | Refills: 0 | Status: SHIPPED | OUTPATIENT
Start: 2023-11-02

## 2023-11-03 ENCOUNTER — PRIOR AUTHORIZATION (OUTPATIENT)
Dept: FAMILY MEDICINE CLINIC | Age: 58
End: 2023-11-03
Payer: COMMERCIAL

## 2023-11-06 ENCOUNTER — TELEPHONE (OUTPATIENT)
Dept: FAMILY MEDICINE CLINIC | Age: 58
End: 2023-11-06
Payer: COMMERCIAL

## 2023-11-06 RX ORDER — PEN NEEDLE, DIABETIC 30 GX3/16"
1 NEEDLE, DISPOSABLE MISCELLANEOUS DAILY
Qty: 100 EACH | Refills: 3 | Status: SHIPPED | OUTPATIENT
Start: 2023-11-06

## 2023-11-06 RX ORDER — PEN NEEDLE, DIABETIC 30 GX3/16"
1 NEEDLE, DISPOSABLE MISCELLANEOUS DAILY
COMMUNITY
End: 2023-11-06 | Stop reason: SDUPTHER

## 2023-11-09 ENCOUNTER — ANTICOAGULATION VISIT (OUTPATIENT)
Dept: FAMILY MEDICINE CLINIC | Age: 58
End: 2023-11-09
Payer: COMMERCIAL

## 2023-11-09 VITALS — DIASTOLIC BLOOD PRESSURE: 75 MMHG | SYSTOLIC BLOOD PRESSURE: 113 MMHG | HEART RATE: 76 BPM

## 2023-11-09 DIAGNOSIS — Z95.2 HX OF AORTIC VALVE REPLACEMENT, MECHANICAL: Primary | ICD-10-CM

## 2023-11-09 LAB — INR PPP: 1.8 (ref 2.5–3.5)

## 2023-11-09 PROCEDURE — 93793 ANTICOAG MGMT PT WARFARIN: CPT | Performed by: FAMILY MEDICINE

## 2023-11-09 PROCEDURE — 85610 PROTHROMBIN TIME: CPT | Performed by: FAMILY MEDICINE

## 2023-11-09 PROCEDURE — 36416 COLLJ CAPILLARY BLOOD SPEC: CPT | Performed by: FAMILY MEDICINE

## 2023-11-10 RX ORDER — WARFARIN SODIUM 1 MG/1
1 TABLET ORAL
COMMUNITY
End: 2023-11-10 | Stop reason: SDUPTHER

## 2023-11-10 RX ORDER — WARFARIN SODIUM 1 MG/1
TABLET ORAL
Qty: 90 TABLET | Refills: 0 | Status: SHIPPED | OUTPATIENT
Start: 2023-11-10

## 2023-11-10 RX ORDER — WARFARIN SODIUM 5 MG/1
TABLET ORAL
Qty: 90 TABLET | Refills: 0 | Status: SHIPPED | OUTPATIENT
Start: 2023-11-10

## 2023-11-22 ENCOUNTER — ANTICOAGULATION VISIT (OUTPATIENT)
Dept: FAMILY MEDICINE CLINIC | Age: 58
End: 2023-11-22
Payer: COMMERCIAL

## 2023-11-22 VITALS — SYSTOLIC BLOOD PRESSURE: 127 MMHG | HEART RATE: 71 BPM | DIASTOLIC BLOOD PRESSURE: 77 MMHG

## 2023-11-22 DIAGNOSIS — Z95.2 HX OF AORTIC VALVE REPLACEMENT, MECHANICAL: Primary | ICD-10-CM

## 2023-11-22 LAB — INR PPP: 3 (ref 2.5–3.5)

## 2023-11-22 PROCEDURE — 93793 ANTICOAG MGMT PT WARFARIN: CPT | Performed by: FAMILY MEDICINE

## 2023-11-22 PROCEDURE — 36416 COLLJ CAPILLARY BLOOD SPEC: CPT | Performed by: FAMILY MEDICINE

## 2023-11-22 PROCEDURE — 85610 PROTHROMBIN TIME: CPT | Performed by: FAMILY MEDICINE

## 2023-12-08 ENCOUNTER — EDUCATION (OUTPATIENT)
Dept: DIABETES SERVICES | Facility: HOSPITAL | Age: 58
End: 2023-12-08
Payer: COMMERCIAL

## 2023-12-08 DIAGNOSIS — E11.8 TYPE 2 DIABETES MELLITUS WITH UNSPECIFIED COMPLICATIONS: Primary | ICD-10-CM

## 2023-12-08 PROCEDURE — G0108 DIAB MANAGE TRN  PER INDIV: HCPCS

## 2023-12-08 NOTE — PROGRESS NOTES
Initial Visit and Education Plan:  Freedom Stevens 58 y.o. presented to [facility] in [place] for initial self diabetes education and training.  Patient states the reason for their visit is to learn what he could and could not eat.  Freedom Stevens is referred by Isabella Barber*.     Ht: 70.9 inches tall    Wt: 283 pounds stated weight    Allergies   Allergen Reactions    Shellfish-Derived Products Unknown - Low Severity        LABS:  Lab Results (most recent)        Latest Reference Range & Units 06/15/23 12:15   Hemoglobin A1C 4.80 - 5.60 % 7.10 (H)   (H): Data is abnormally high           Labs reviewed with patient.    Past Medical History:   Diagnosis Date    Chest pain     Heart disease     High blood pressure     Hx of blood diseases     Kidney stone     Polycystic kidney disease     Sleep apnea         Past Surgical History:    AORTIC VALVE REPAIR/REPLACEMENT    CARDIAC SURGERY    GALLBLADDER SURGERY        Social History     Tobacco Use    Smoking status: Never     Passive exposure: Past    Smokeless tobacco: Never   Vaping Use    Vaping Use: Never used   Substance Use Topics    Alcohol use: Not Currently    Drug use: Never        Family History   Problem Relation Age of Onset    Colon cancer Sister         40s    Prostate cancer Brother         40s    Colon cancer Brother         40s    Colon cancer Other         Education Plan as follows:      Blood Glucose Monitoring Instructions and Plan:   We reviewed blood glucose testing and ADA recommended blood glucose level for fasting, pre and post meals. Patient demonstrates understanding and importance of testing blood glucose 1 times a day; Patient will log BG results. Patient was given written material including blood glucose log.     Initial Nutrition Instructions and Plan:   Patient was instructed and demonstrated reading a food label. Serving size and carbohydrate amounts were emphasized.   60 carbs per meal 3 meals a  day  15-20 carbs per snacks3 snacks per day.   Patient was given written materials..   Plate method was explained to patient.      Medication Instructions and Plan:     Current Outpatient Medications:     amLODIPine (NORVASC) 10 MG tablet, Take 1 tablet by mouth once daily, Disp: 90 tablet, Rfl: 0    Blood Glucose Monitoring Suppl (Accu-Chek Guide Me) w/Device kit, USE 1 UNIT TO CHECK GLUCOSE ONCE DAILY, Disp: , Rfl:     carvedilol (COREG) 12.5 MG tablet, Take 1 tablet by mouth 2 (Two) Times a Day With Meals., Disp: 180 tablet, Rfl: 1    Glucose Blood (Blood Glucose Test Strips 333) strip, Use 1 each Daily. DX E11.9, Disp: 300 strip, Rfl: 1    glucose monitor monitoring kit, Use 1 each Daily. DX E11.9, Disp: 1 each, Rfl: 0    hydroCHLOROthiazide (HYDRODIURIL) 50 MG tablet, Take 1 tablet by mouth once daily, Disp: 90 tablet, Rfl: 1    Insulin Pen Needle (Pen Needles) 32G X 4 MM misc, Use 1 each Daily. Daily with Victoza, Disp: 100 each, Rfl: 3    Lancets 28G misc, Use 1 each Daily. DX E11.9, Disp: 300 each, Rfl: 1    Liraglutide (Victoza) 18 MG/3ML solution pen-injector injection, Inject 0.6 mg under the skin into the appropriate area as directed Daily., Disp: 3 mL, Rfl: 1    lithium (ESKALITH) 450 MG CR tablet, Take 1 tablet by mouth 2 (Two) Times a Day., Disp: , Rfl:     losartan (COZAAR) 25 MG tablet, Take 2 tablets by mouth 2 (Two) Times a Day., Disp: 360 tablet, Rfl: 1    metFORMIN (GLUCOPHAGE) 500 MG tablet, Take 1 tablet by mouth Daily., Disp: 90 tablet, Rfl: 1    ondansetron ODT (ZOFRAN-ODT) 4 MG disintegrating tablet, Place 1 tablet on the tongue Every 8 (Eight) Hours As Needed for Nausea or Vomiting., Disp: 20 tablet, Rfl: 0    pramipexole (MIRAPEX) 0.5 MG tablet, Take 1 tablet by mouth 2 (Two) Times a Day., Disp: , Rfl:     pravastatin (PRAVACHOL) 20 MG tablet, Take 1 tablet by mouth every night at bedtime., Disp: 90 tablet, Rfl: 1    risperiDONE (risperDAL) 2 MG tablet, Take 1 tablet by mouth Daily.,  Disp: , Rfl:     tamsulosin (FLOMAX) 0.4 MG capsule 24 hr capsule, Take 1 capsule by mouth Daily for 360 days., Disp: 90 capsule, Rfl: 3    warfarin (COUMADIN) 1 MG tablet, Take 1 tablet 5 mg with 1 mg tablet every Monday/Friday for 6 mg dose. Take 1 tablet 5 mg with 4 tablets of 1 mg tablet Tuesday,Wednesday,Thursday,Saturday,Sunday for 9 mg dose, Disp: 90 tablet, Rfl: 0    warfarin (COUMADIN) 5 MG tablet, Take 1 tablet 5 mg with 1 mg tablet every Monday/Friday for 6 mg dose. Take 1 tablet 5 mg with 4 tablets of 1 mg tablet Tuesday,Wednesday,Thursday,Saturday,Sunday for 9 mg dose, Disp: 90 tablet, Rfl: 0   Medication list was reviewed with patient. Patient denies questions or concerns regarding current medication therapy. Patient was instructed to continue medications as prescribed.     Exercise:   Patient has been instructed to walk for 10 minutes after each meal initially and build strength and endurance to achieve 30 minutes of sustained exercise per day; recommended patient seek advice from Primary Care Provider prior to beginning any exercise program if other health concerns exist.     Problem solving and reducing risks:   I explained the importance of keeping provider appointments, taking medications as prescribed, having laboratory work performed as ordered by provider and seeking care as soon as possible when complications occur.     Patient Selected Behavioral Goal :  #1 - To decrease portion sizes    Patient Selected Ongoing Support Plan:  Family Member Support          Follow up Instructions and Plan:Patient was encouraged to contact DSME staff with questions and or concerns.  DSME staff contact information was given to patient.  Patient will be contacted when group classes resume.  DSME staff will contact patient at 3 months and 6 months to evaluate patient's further needs regarding diabetes.          This note will be forwarded to PCP.  EULOGIO YIN-AWHC, MLDE, CDCES    Start Time: 08:00  End  Time: 08:30      12/08/2023

## 2023-12-08 NOTE — LETTER
December 8, 2023     Isabella Barber MD  [address] [facility]  [address]  [place] [address]    Patient: Freedom Stevens   YOB: 1965   Date of Visit: 12/8/2023     Dear Isabella Barber MD:       Thank you for referring Freedom Stevens to me for evaluation. Below are the relevant portions of my assessment and plan of care.    If you have questions, please do not hesitate to call me. I look forward to following Freedom along with you.         Sincerely,        Rica Powell, RNC, MLDE, [facility]        CC: No Recipients     Initial Visit and Education Plan:  Freedom Stevens 58 y.o. presented to [facility] in [place] for initial self diabetes education and training.  Patient states the reason for their visit is to learn what he could and could not eat.  Freedom Stevens is referred by Isabella Barber*.     Ht: 70.9 inches tall    Wt: 283 pounds stated weight    Allergies   Allergen Reactions   • Shellfish-Derived Products Unknown - Low Severity        LABS:  Lab Results (most recent)        Latest Reference Range & Units 06/15/23 12:15   Hemoglobin A1C 4.80 - 5.60 % 7.10 (H)   (H): Data is abnormally high           Labs reviewed with patient.    Past Medical History:   Diagnosis Date   • Chest pain    • Heart disease    • High blood pressure    • Hx of blood diseases    • Kidney stone    • Polycystic kidney disease    • Sleep apnea         Past Surgical History:   • AORTIC VALVE REPAIR/REPLACEMENT   • CARDIAC SURGERY   • GALLBLADDER SURGERY        Social History     Tobacco Use   • Smoking status: Never     Passive exposure: Past   • Smokeless tobacco: Never   Vaping Use   • Vaping Use: Never used   Substance Use Topics   • Alcohol use: Not Currently   • Drug use: Never        Family History   Problem Relation Age of Onset   • Colon cancer Sister         40s   • Prostate cancer Brother         40s   • Colon cancer Brother         40s   • Colon cancer Other          Education Plan as follows:      Blood Glucose Monitoring Instructions and Plan:   We reviewed blood glucose testing and ADA recommended blood glucose level for fasting, pre and post meals. Patient demonstrates understanding and importance of testing blood glucose 1 times a day; Patient will log BG results. Patient was given written material including blood glucose log.     Initial Nutrition Instructions and Plan:   Patient was instructed and demonstrated reading a food label. Serving size and carbohydrate amounts were emphasized.   60 carbs per meal 3 meals a day  15-20 carbs per snacks3 snacks per day.   Patient was given written materials..   Plate method was explained to patient.      Medication Instructions and Plan:     Current Outpatient Medications:   •  amLODIPine (NORVASC) 10 MG tablet, Take 1 tablet by mouth once daily, Disp: 90 tablet, Rfl: 0  •  Blood Glucose Monitoring Suppl (Accu-Chek Guide Me) w/Device kit, USE 1 UNIT TO CHECK GLUCOSE ONCE DAILY, Disp: , Rfl:   •  carvedilol (COREG) 12.5 MG tablet, Take 1 tablet by mouth 2 (Two) Times a Day With Meals., Disp: 180 tablet, Rfl: 1  •  Glucose Blood (Blood Glucose Test Strips 333) strip, Use 1 each Daily. DX E11.9, Disp: 300 strip, Rfl: 1  •  glucose monitor monitoring kit, Use 1 each Daily. DX E11.9, Disp: 1 each, Rfl: 0  •  hydroCHLOROthiazide (HYDRODIURIL) 50 MG tablet, Take 1 tablet by mouth once daily, Disp: 90 tablet, Rfl: 1  •  Insulin Pen Needle (Pen Needles) 32G X 4 MM misc, Use 1 each Daily. Daily with Victoza, Disp: 100 each, Rfl: 3  •  Lancets 28G misc, Use 1 each Daily. DX E11.9, Disp: 300 each, Rfl: 1  •  Liraglutide (Victoza) 18 MG/3ML solution pen-injector injection, Inject 0.6 mg under the skin into the appropriate area as directed Daily., Disp: 3 mL, Rfl: 1  •  lithium (ESKALITH) 450 MG CR tablet, Take 1 tablet by mouth 2 (Two) Times a Day., Disp: , Rfl:   •  losartan (COZAAR) 25 MG tablet, Take 2 tablets by mouth 2 (Two) Times a  Day., Disp: 360 tablet, Rfl: 1  •  metFORMIN (GLUCOPHAGE) 500 MG tablet, Take 1 tablet by mouth Daily., Disp: 90 tablet, Rfl: 1  •  ondansetron ODT (ZOFRAN-ODT) 4 MG disintegrating tablet, Place 1 tablet on the tongue Every 8 (Eight) Hours As Needed for Nausea or Vomiting., Disp: 20 tablet, Rfl: 0  •  pramipexole (MIRAPEX) 0.5 MG tablet, Take 1 tablet by mouth 2 (Two) Times a Day., Disp: , Rfl:   •  pravastatin (PRAVACHOL) 20 MG tablet, Take 1 tablet by mouth every night at bedtime., Disp: 90 tablet, Rfl: 1  •  risperiDONE (risperDAL) 2 MG tablet, Take 1 tablet by mouth Daily., Disp: , Rfl:   •  tamsulosin (FLOMAX) 0.4 MG capsule 24 hr capsule, Take 1 capsule by mouth Daily for 360 days., Disp: 90 capsule, Rfl: 3  •  warfarin (COUMADIN) 1 MG tablet, Take 1 tablet 5 mg with 1 mg tablet every Monday/Friday for 6 mg dose. Take 1 tablet 5 mg with 4 tablets of 1 mg tablet Tuesday,Wednesday,Thursday,Saturday,Sunday for 9 mg dose, Disp: 90 tablet, Rfl: 0  •  warfarin (COUMADIN) 5 MG tablet, Take 1 tablet 5 mg with 1 mg tablet every Monday/Friday for 6 mg dose. Take 1 tablet 5 mg with 4 tablets of 1 mg tablet Tuesday,Wednesday,Thursday,Saturday,Sunday for 9 mg dose, Disp: 90 tablet, Rfl: 0   Medication list was reviewed with patient. Patient denies questions or concerns regarding current medication therapy. Patient was instructed to continue medications as prescribed.     Exercise:   Patient has been instructed to walk for 10 minutes after each meal initially and build strength and endurance to achieve 30 minutes of sustained exercise per day; recommended patient seek advice from Primary Care Provider prior to beginning any exercise program if other health concerns exist.     Problem solving and reducing risks:   I explained the importance of keeping provider appointments, taking medications as prescribed, having laboratory work performed as ordered by provider and seeking care as soon as possible when complications occur.      Patient Selected Behavioral Goal :  #1 - To decrease portion sizes    Patient Selected Ongoing Support Plan:  Family Member Support          Follow up Instructions and Plan:Patient was encouraged to contact DSME staff with questions and or concerns.  DSME staff contact information was given to patient.  Patient will be contacted when group classes resume.  DSME staff will contact patient at 3 months and 6 months to evaluate patient's further needs regarding diabetes.          This note will be forwarded to PCP.  EULOGIO YIN-AWORIN, MLDE, [facility]    Start Time: 08:00  End Time: 08:30      12/08/2023

## 2023-12-14 ENCOUNTER — OFFICE VISIT (OUTPATIENT)
Dept: FAMILY MEDICINE CLINIC | Age: 58
End: 2023-12-14
Payer: COMMERCIAL

## 2023-12-14 ENCOUNTER — TRANSCRIBE ORDERS (OUTPATIENT)
Dept: ADMINISTRATIVE | Facility: HOSPITAL | Age: 58
End: 2023-12-14
Payer: COMMERCIAL

## 2023-12-14 ENCOUNTER — LAB (OUTPATIENT)
Dept: LAB | Facility: HOSPITAL | Age: 58
End: 2023-12-14
Payer: COMMERCIAL

## 2023-12-14 VITALS
DIASTOLIC BLOOD PRESSURE: 79 MMHG | BODY MASS INDEX: 38.27 KG/M2 | HEART RATE: 88 BPM | HEIGHT: 71 IN | SYSTOLIC BLOOD PRESSURE: 121 MMHG | WEIGHT: 273.4 LBS | TEMPERATURE: 99.1 F

## 2023-12-14 DIAGNOSIS — D64.9 ANEMIA, UNSPECIFIED TYPE: ICD-10-CM

## 2023-12-14 DIAGNOSIS — E78.2 MIXED HYPERLIPIDEMIA: ICD-10-CM

## 2023-12-14 DIAGNOSIS — F41.1 GENERALIZED ANXIETY DISORDER: ICD-10-CM

## 2023-12-14 DIAGNOSIS — Z12.5 PROSTATE CANCER SCREENING: ICD-10-CM

## 2023-12-14 DIAGNOSIS — I10 ESSENTIAL HYPERTENSION: ICD-10-CM

## 2023-12-14 DIAGNOSIS — Z95.2 HX OF AORTIC VALVE REPLACEMENT, MECHANICAL: ICD-10-CM

## 2023-12-14 DIAGNOSIS — F31.61 BIPOLAR DISORDER, CURRENT EPISODE MIXED, MILD: ICD-10-CM

## 2023-12-14 DIAGNOSIS — Q61.9 CONGENITAL CYSTIC KIDNEY DISEASE: ICD-10-CM

## 2023-12-14 DIAGNOSIS — Z79.899 ENCOUNTER FOR LONG-TERM (CURRENT) USE OF OTHER MEDICATIONS: ICD-10-CM

## 2023-12-14 DIAGNOSIS — G47.33 OBSTRUCTIVE SLEEP APNEA SYNDROME: ICD-10-CM

## 2023-12-14 DIAGNOSIS — Z00.00 ANNUAL PHYSICAL EXAM: Primary | ICD-10-CM

## 2023-12-14 DIAGNOSIS — I71.21 ANEURYSM OF ASCENDING AORTA WITHOUT RUPTURE: ICD-10-CM

## 2023-12-14 DIAGNOSIS — Z79.899 ENCOUNTER FOR LONG-TERM (CURRENT) USE OF OTHER MEDICATIONS: Primary | ICD-10-CM

## 2023-12-14 DIAGNOSIS — E11.9 TYPE 2 DIABETES MELLITUS WITHOUT COMPLICATION, WITHOUT LONG-TERM CURRENT USE OF INSULIN: ICD-10-CM

## 2023-12-14 LAB
ALBUMIN SERPL-MCNC: 4.7 G/DL (ref 3.5–5.2)
ALBUMIN/GLOB SERPL: 1.7 G/DL
ALP SERPL-CCNC: 61 U/L (ref 39–117)
ALT SERPL W P-5'-P-CCNC: 21 U/L (ref 1–41)
ANION GAP SERPL CALCULATED.3IONS-SCNC: 6 MMOL/L (ref 5–15)
AST SERPL-CCNC: 24 U/L (ref 1–40)
BASOPHILS # BLD AUTO: 0.04 10*3/MM3 (ref 0–0.2)
BASOPHILS NFR BLD AUTO: 0.4 % (ref 0–1.5)
BILIRUB SERPL-MCNC: 0.6 MG/DL (ref 0–1.2)
BUN SERPL-MCNC: 16 MG/DL (ref 6–20)
BUN/CREAT SERPL: 15.1 (ref 7–25)
CALCIUM SPEC-SCNC: 10.3 MG/DL (ref 8.6–10.5)
CHLORIDE SERPL-SCNC: 103 MMOL/L (ref 98–107)
CHOLEST SERPL-MCNC: 110 MG/DL (ref 0–200)
CO2 SERPL-SCNC: 28 MMOL/L (ref 22–29)
CREAT SERPL-MCNC: 1.06 MG/DL (ref 0.76–1.27)
DEPRECATED RDW RBC AUTO: 44.9 FL (ref 37–54)
EGFRCR SERPLBLD CKD-EPI 2021: 80.8 ML/MIN/1.73
EOSINOPHIL # BLD AUTO: 0.34 10*3/MM3 (ref 0–0.4)
EOSINOPHIL NFR BLD AUTO: 3.6 % (ref 0.3–6.2)
ERYTHROCYTE [DISTWIDTH] IN BLOOD BY AUTOMATED COUNT: 13.4 % (ref 12.3–15.4)
GLOBULIN UR ELPH-MCNC: 2.7 GM/DL
GLUCOSE SERPL-MCNC: 115 MG/DL (ref 65–99)
HBA1C MFR BLD: 7.4 % (ref 4.8–5.6)
HCT VFR BLD AUTO: 38.8 % (ref 37.5–51)
HDLC SERPL-MCNC: 31 MG/DL (ref 40–60)
HGB BLD-MCNC: 12.5 G/DL (ref 13–17.7)
IMM GRANULOCYTES # BLD AUTO: 0.02 10*3/MM3 (ref 0–0.05)
IMM GRANULOCYTES NFR BLD AUTO: 0.2 % (ref 0–0.5)
INR PPP: 2.6 (ref 2.5–3.5)
LDLC SERPL CALC-MCNC: 62 MG/DL (ref 0–100)
LDLC/HDLC SERPL: 1.99 {RATIO}
LITHIUM SERPL-SCNC: 0.8 MMOL/L (ref 0.6–1.2)
LYMPHOCYTES # BLD AUTO: 1.02 10*3/MM3 (ref 0.7–3.1)
LYMPHOCYTES NFR BLD AUTO: 10.7 % (ref 19.6–45.3)
MCH RBC QN AUTO: 29.1 PG (ref 26.6–33)
MCHC RBC AUTO-ENTMCNC: 32.2 G/DL (ref 31.5–35.7)
MCV RBC AUTO: 90.4 FL (ref 79–97)
MONOCYTES # BLD AUTO: 0.94 10*3/MM3 (ref 0.1–0.9)
MONOCYTES NFR BLD AUTO: 9.9 % (ref 5–12)
NEUTROPHILS NFR BLD AUTO: 7.13 10*3/MM3 (ref 1.7–7)
NEUTROPHILS NFR BLD AUTO: 75.2 % (ref 42.7–76)
PLATELET # BLD AUTO: 192 10*3/MM3 (ref 140–450)
PMV BLD AUTO: 10.4 FL (ref 6–12)
POTASSIUM SERPL-SCNC: 3.9 MMOL/L (ref 3.5–5.2)
PROT SERPL-MCNC: 7.4 G/DL (ref 6–8.5)
PSA SERPL-MCNC: 0.97 NG/ML (ref 0–4)
RBC # BLD AUTO: 4.29 10*6/MM3 (ref 4.14–5.8)
SODIUM SERPL-SCNC: 137 MMOL/L (ref 136–145)
T3 SERPL-MCNC: 94.8 NG/DL (ref 80–200)
T4 SERPL-MCNC: 6.66 MCG/DL (ref 4.5–11.7)
TRIGL SERPL-MCNC: 87 MG/DL (ref 0–150)
TSH SERPL DL<=0.05 MIU/L-ACNC: 2.75 UIU/ML (ref 0.27–4.2)
VLDLC SERPL-MCNC: 17 MG/DL (ref 5–40)
WBC NRBC COR # BLD AUTO: 9.49 10*3/MM3 (ref 3.4–10.8)

## 2023-12-14 PROCEDURE — G0103 PSA SCREENING: HCPCS

## 2023-12-14 PROCEDURE — 80178 ASSAY OF LITHIUM: CPT

## 2023-12-14 PROCEDURE — 83036 HEMOGLOBIN GLYCOSYLATED A1C: CPT

## 2023-12-14 PROCEDURE — 80061 LIPID PANEL: CPT

## 2023-12-14 PROCEDURE — 36415 COLL VENOUS BLD VENIPUNCTURE: CPT

## 2023-12-14 PROCEDURE — 84436 ASSAY OF TOTAL THYROXINE: CPT

## 2023-12-14 PROCEDURE — 84480 ASSAY TRIIODOTHYRONINE (T3): CPT

## 2023-12-14 PROCEDURE — 80050 GENERAL HEALTH PANEL: CPT

## 2023-12-14 RX ORDER — WARFARIN SODIUM 5 MG/1
TABLET ORAL
Qty: 90 TABLET | Refills: 1 | Status: SHIPPED | OUTPATIENT
Start: 2023-12-14

## 2023-12-14 RX ORDER — AMLODIPINE BESYLATE 10 MG/1
10 TABLET ORAL DAILY
Qty: 90 TABLET | Refills: 1 | Status: SHIPPED | OUTPATIENT
Start: 2023-12-14

## 2023-12-14 RX ORDER — LIRAGLUTIDE 6 MG/ML
0.6 INJECTION SUBCUTANEOUS DAILY
Qty: 3 ML | Refills: 1 | Status: SHIPPED | OUTPATIENT
Start: 2023-12-14 | End: 2023-12-15 | Stop reason: SDUPTHER

## 2023-12-14 NOTE — PROGRESS NOTES
Chief Complaint  Annual Exam    Subjective          Freedom Stevens presents to [facility] today for his annual physical.      He is UTD on colonoscopy, last done 12/2014 and this was normal.  Ten year repeat recommended.  He is due for prostate cancer screening.  He is UTD on Shingrix (3/2020, 8/2020), Tdap (10/2018) and flu (10/2021).  He is due for COVID.  He is due for routine labs including DM panel, CBC and PSA.    He is on metformin for diabetes.  Last A1c 7.1 in May.  He is on a statin and ARB.  He is UTD on foot exam (4/2023).  He is UTD on eye exam, last done 1/2023.     He is currently on amlodipine, carvedilol, HCTZ, and losartan for hypertension.  BP has been well controlled.  No chest pain, palpitations, or shortness of breath.     He is on pravastatin for hyperlipidemia.  No myalgias.      He is on warfarin for anticoagulation of a mechanical aortic valve. Follows with Dr. [facility].  We are managing the INR.  INR today came back 2.6.    He is following yearly with CT surgery for a 4.7 cm fusiform ascending aortic aneurysm.  He does also follow with Dr. [facility].  He already has a history of ascending aortic aneurysm repair and aortic valve replacement.      He follows with Dr. Mccauley for bipolar disorder with depressed mood and anxiety.  He is on a regimen of lithium, risperidone, and pramipexole. His sx have been pretty well controlled.      He has sleep apnea.  Unable to tolerate CPAP.    Review of Systems   Constitutional:  Negative for chills, fatigue and fever.   HENT:  Negative for congestion, hearing loss and rhinorrhea.    Eyes:  Negative for pain and visual disturbance.   Respiratory:  Negative for cough and shortness of breath.    Cardiovascular:  Negative for chest pain and palpitations.   Gastrointestinal:  Negative for abdominal pain, constipation, diarrhea, nausea and vomiting.   Genitourinary:  Negative for dysuria and  hematuria.   Musculoskeletal:  Negative for arthralgias and myalgias.   Skin:  Negative for rash.   Neurological:  Negative for weakness and numbness.   Psychiatric/Behavioral:  Negative for dysphoric mood and sleep disturbance. The patient is not nervous/anxious.          Current Outpatient Medications:     amLODIPine (NORVASC) 10 MG tablet, Take 1 tablet by mouth Daily., Disp: 90 tablet, Rfl: 1    Blood Glucose Monitoring Suppl (Accu-Chek Guide Me) w/Device kit, USE 1 UNIT TO CHECK GLUCOSE ONCE DAILY, Disp: , Rfl:     carvedilol (COREG) 12.5 MG tablet, Take 1 tablet by mouth 2 (Two) Times a Day With Meals., Disp: 180 tablet, Rfl: 1    Glucose Blood (Blood Glucose Test Strips 333) strip, Use 1 each Daily. DX E11.9, Disp: 300 strip, Rfl: 1    glucose monitor monitoring kit, Use 1 each Daily. DX E11.9, Disp: 1 each, Rfl: 0    hydroCHLOROthiazide (HYDRODIURIL) 50 MG tablet, Take 1 tablet by mouth once daily, Disp: 90 tablet, Rfl: 1    Insulin Pen Needle (Pen Needles) 32G X 4 MM misc, Use 1 each Daily. Daily with Victoza, Disp: 100 each, Rfl: 3    Lancets 28G misc, Use 1 each Daily. DX E11.9, Disp: 300 each, Rfl: 1    Liraglutide (Victoza) 18 MG/3ML solution pen-injector injection, Inject 0.6 mg under the skin into the appropriate area as directed Daily., Disp: 3 mL, Rfl: 1    lithium (ESKALITH) 450 MG CR tablet, Take 1 tablet by mouth 2 (Two) Times a Day., Disp: , Rfl:     losartan (COZAAR) 25 MG tablet, Take 2 tablets by mouth 2 (Two) Times a Day., Disp: 360 tablet, Rfl: 1    metFORMIN (GLUCOPHAGE) 500 MG tablet, Take 1 tablet by mouth Daily., Disp: 90 tablet, Rfl: 1    ondansetron ODT (ZOFRAN-ODT) 4 MG disintegrating tablet, Place 1 tablet on the tongue Every 8 (Eight) Hours As Needed for Nausea or Vomiting., Disp: 20 tablet, Rfl: 0    pramipexole (MIRAPEX) 0.5 MG tablet, Take 1 tablet by mouth 2 (Two) Times a Day., Disp: , Rfl:     pravastatin (PRAVACHOL) 20 MG tablet, Take 1 tablet by mouth every night at  "bedtime., Disp: 90 tablet, Rfl: 1    risperiDONE (risperDAL) 2 MG tablet, Take 1 tablet by mouth Daily., Disp: , Rfl:     tamsulosin (FLOMAX) 0.4 MG capsule 24 hr capsule, Take 1 capsule by mouth Daily for 360 days., Disp: 90 capsule, Rfl: 3    warfarin (COUMADIN) 1 MG tablet, Take 1 tablet 5 mg with 1 mg tablet every Monday/Friday for 6 mg dose. Take 1 tablet 5 mg with 4 tablets of 1 mg tablet Tuesday,Wednesday,Thursday,Saturday,Sunday for 9 mg dose, Disp: 90 tablet, Rfl: 0    warfarin (COUMADIN) 5 MG tablet, Take 1 tablet 5 mg with 1 mg tablet every Monday/Friday for 6 mg dose. Take 1 tablet 5 mg with 4 tablets of 1 mg tablet Tuesday,Wednesday,Thursday,Saturday,Sunday for 9 mg dose, Disp: 90 tablet, Rfl: 1    Allergies:  Shellfish-derived products      Objective   Vital Signs:   Vitals:    12/14/23 1330   BP: 121/79   BP Location: Left arm   Patient Position: Sitting   Cuff Size: Adult   Pulse: 88   Temp: 99.1 °F (37.3 °C)   TempSrc: Oral   Weight: 124 kg (273 lb 6.4 oz)   Height: 180.3 cm (70.98\")     Physical Exam  Vitals reviewed.   Constitutional:       General: He is not in acute distress.     Appearance: Normal appearance. He is well-developed.   HENT:      Head: Normocephalic and atraumatic.      Right Ear: External ear normal.      Left Ear: External ear normal.      Mouth/Throat:      Mouth: Mucous membranes are moist.   Eyes:      Extraocular Movements: Extraocular movements intact.      Conjunctiva/sclera: Conjunctivae normal.      Pupils: Pupils are equal, round, and reactive to light.   Cardiovascular:      Rate and Rhythm: Normal rate and regular rhythm.      Heart sounds: No murmur heard.     Comments: +click  Pulmonary:      Effort: Pulmonary effort is normal.      Breath sounds: Normal breath sounds. No wheezing, rhonchi or rales.   Abdominal:      General: Bowel sounds are normal. There is no distension.      Palpations: Abdomen is soft.      Tenderness: There is no abdominal tenderness. "   Musculoskeletal:         General: Normal range of motion.      Right lower leg: No edema.      Left lower leg: No edema.   Skin:     General: Skin is warm and dry.   Neurological:      Mental Status: He is alert and oriented to person, place, and time.      Deep Tendon Reflexes: Reflexes normal.   Psychiatric:         Mood and Affect: Mood and affect normal.         Behavior: Behavior normal.         Thought Content: Thought content normal.         Judgment: Judgment normal.        Lab Results   Component Value Date    GLUCOSE 211 (H) 07/24/2023    BUN 11 07/24/2023    CREATININE 0.97 07/24/2023    EGFR 89.9 07/24/2023    BCR 11.3 07/24/2023    K 4.3 07/24/2023    CO2 25.0 07/24/2023    CALCIUM 9.6 07/24/2023    ALBUMIN 4.6 07/24/2023    BILITOT 0.4 07/24/2023    AST 51 (H) 07/24/2023    ALT 43 (H) 07/24/2023       Lab Results   Component Value Date    CHOL 125 04/28/2023    CHLPL 125 03/11/2021    TRIG 168 (H) 04/28/2023    HDL 32 (L) 04/28/2023    LDL 64 04/28/2023       Lab Results   Component Value Date    WBC 5.12 06/15/2023    HGB 14.4 06/15/2023    HCT 43.7 06/15/2023    MCV 88.6 06/15/2023     06/15/2023     Lab Results   Component Value Date    HGBA1C 7.10 (H) 06/15/2023          Assessment and Plan    Diagnoses and all orders for this visit:    1. Annual physical exam (Primary)  Assessment & Plan:  He is UTD on colonoscopy, last done 12/2014 and this was normal.  Ten year repeat recommended.  He is due for prostate cancer screening; PSA ordered.  He is UTD on Shingrix (3/2020, 8/2020), Tdap (10/2018) and flu (10/2021).  He is due for COVID; declines today.  He is due for routine labs including DM panel, CBC and PSA; ordered.      2. Type 2 diabetes mellitus without complication, without long-term current use of insulin  Assessment & Plan:  He is on metformin for diabetes.  No refills needed.  Last A1c 7.1 in May.  He is on a statin and ARB.  He is UTD on foot exam (4/2023).  He is UTD on eye exam,  last done 1/2023.  Checking labs.    Orders:  -     Comprehensive Metabolic Panel; Future  -     Lipid Panel; Future  -     Hemoglobin A1c; Future  -     Liraglutide (Victoza) 18 MG/3ML solution pen-injector injection; Inject 0.6 mg under the skin into the appropriate area as directed Daily.  Dispense: 3 mL; Refill: 1    3. Essential hypertension  Assessment & Plan:  Blood pressure has been running at goal.  Continue amlodipien 10 mg daily, HCTZ 50 mg daily and losartan 50 mg twice daily.  Refills were needed today.  Labs were needed today.  Continue to monitor.      Orders:  -     CBC Auto Differential; Future  -     amLODIPine (NORVASC) 10 MG tablet; Take 1 tablet by mouth Daily.  Dispense: 90 tablet; Refill: 1    4. Mixed hyperlipidemia  Assessment & Plan:  Stable on pravastatin 20 mg daily.  Refills were not needed today.  Labs were due today.  Continue to monitor.        5. Bipolar disorder, current episode mixed, mild  Overview:  Following with Dr. Mccauley.  Stable on lithium, risperidone, and pramipexole.       6. Generalized anxiety disorder  Overview:  With Dr. Mccauley.  As per bipolar plan.      7. Aneurysm of ascending aorta without rupture  Overview:  He was evaluated by CT Surgery who recommended routine follow-up in 1 year.  They over read the CT report as a fusiform ascending aortic aneurysm of 4.5 as opposed to 4.7 cm.  History of ascending aortic aneurysm repair and aortic valve replacement.      8. Hx of aortic valve replacement, mechanical  -     POC INR  -     warfarin (COUMADIN) 5 MG tablet; Take 1 tablet 5 mg with 1 mg tablet every Monday/Friday for 6 mg dose. Take 1 tablet 5 mg with 4 tablets of 1 mg tablet Tuesday,Wednesday,Thursday,Saturday,Sunday for 9 mg dose  Dispense: 90 tablet; Refill: 1    9. Obstructive sleep apnea syndrome  Overview:  Unable to tolerate CPAP.      10. Congenital cystic kidney disease  Overview:  Stable.  Continue to monitor.      Assessment & Plan:  Checking  labs.      11. Prostate cancer screening  -     PSA Screen; Future        Follow Up   Return in about 4 months (around 4/14/2024) for Recheck.  Patient was given instructions and counseling regarding his condition or for health maintenance advice. Please see specific information pulled into the AVS if appropriate.

## 2023-12-14 NOTE — ASSESSMENT & PLAN NOTE
He is on metformin for diabetes.  No refills needed.  Last A1c 7.1 in May.  He is on a statin and ARB.  He is UTD on foot exam (4/2023).  He is UTD on eye exam, last done 1/2023.  Checking labs.

## 2023-12-14 NOTE — ASSESSMENT & PLAN NOTE
Blood pressure has been running at goal.  Continue amlodipien 10 mg daily, HCTZ 50 mg daily and losartan 50 mg twice daily.  Refills were needed today.  Labs were needed today.  Continue to monitor.

## 2023-12-14 NOTE — ASSESSMENT & PLAN NOTE
Stable on pravastatin 20 mg daily.  Refills were not needed today.  Labs were due today.  Continue to monitor.

## 2023-12-14 NOTE — ASSESSMENT & PLAN NOTE
He is UTD on colonoscopy, last done 12/2014 and this was normal.  Ten year repeat recommended.  He is due for prostate cancer screening; PSA ordered.  He is UTD on Shingrix (3/2020, 8/2020), Tdap (10/2018) and flu (10/2021).  He is due for COVID; declines today.  He is due for routine labs including DM panel, CBC and PSA; ordered.

## 2023-12-15 RX ORDER — LIRAGLUTIDE 6 MG/ML
1.2 INJECTION SUBCUTANEOUS DAILY
Qty: 3 ML | Refills: 1 | Status: SHIPPED | OUTPATIENT
Start: 2023-12-15

## 2023-12-18 ENCOUNTER — LAB (OUTPATIENT)
Dept: LAB | Facility: HOSPITAL | Age: 58
End: 2023-12-18
Payer: COMMERCIAL

## 2023-12-18 DIAGNOSIS — D64.9 ANEMIA, UNSPECIFIED TYPE: ICD-10-CM

## 2023-12-18 LAB
FERRITIN SERPL-MCNC: 301 NG/ML (ref 30–400)
IRON 24H UR-MRATE: 46 MCG/DL (ref 59–158)
IRON SATN MFR SERPL: 15 % (ref 20–50)
TIBC SERPL-MCNC: 301 MCG/DL (ref 298–536)
TRANSFERRIN SERPL-MCNC: 202 MG/DL (ref 200–360)

## 2023-12-18 PROCEDURE — 83540 ASSAY OF IRON: CPT

## 2023-12-18 PROCEDURE — 36415 COLL VENOUS BLD VENIPUNCTURE: CPT

## 2023-12-18 PROCEDURE — 82728 ASSAY OF FERRITIN: CPT

## 2023-12-18 PROCEDURE — 84466 ASSAY OF TRANSFERRIN: CPT

## 2023-12-21 DIAGNOSIS — D64.9 ANEMIA, UNSPECIFIED TYPE: ICD-10-CM

## 2023-12-21 DIAGNOSIS — Z12.11 COLON CANCER SCREENING: Primary | ICD-10-CM

## 2024-01-09 ENCOUNTER — ANTICOAGULATION VISIT (OUTPATIENT)
Dept: FAMILY MEDICINE CLINIC | Age: 59
End: 2024-01-09
Payer: COMMERCIAL

## 2024-01-09 VITALS — DIASTOLIC BLOOD PRESSURE: 80 MMHG | SYSTOLIC BLOOD PRESSURE: 119 MMHG | HEART RATE: 86 BPM

## 2024-01-09 DIAGNOSIS — Z95.2 HX OF AORTIC VALVE REPLACEMENT, MECHANICAL: Primary | ICD-10-CM

## 2024-01-09 LAB — INR PPP: 2.3 (ref 2.5–3.5)

## 2024-01-09 PROCEDURE — 93793 ANTICOAG MGMT PT WARFARIN: CPT | Performed by: FAMILY MEDICINE

## 2024-01-09 PROCEDURE — 36416 COLLJ CAPILLARY BLOOD SPEC: CPT | Performed by: FAMILY MEDICINE

## 2024-01-09 PROCEDURE — 85610 PROTHROMBIN TIME: CPT | Performed by: FAMILY MEDICINE

## 2024-01-17 DIAGNOSIS — E11.9 TYPE 2 DIABETES MELLITUS WITHOUT COMPLICATION, WITHOUT LONG-TERM CURRENT USE OF INSULIN: ICD-10-CM

## 2024-01-17 RX ORDER — LIRAGLUTIDE 6 MG/ML
INJECTION SUBCUTANEOUS
Qty: 6 ML | Refills: 5 | Status: SHIPPED | OUTPATIENT
Start: 2024-01-17

## 2024-01-23 ENCOUNTER — ANTICOAGULATION VISIT (OUTPATIENT)
Dept: FAMILY MEDICINE CLINIC | Age: 59
End: 2024-01-23
Payer: COMMERCIAL

## 2024-01-23 VITALS — SYSTOLIC BLOOD PRESSURE: 120 MMHG | HEART RATE: 82 BPM | DIASTOLIC BLOOD PRESSURE: 80 MMHG

## 2024-01-23 DIAGNOSIS — Z95.2 HX OF AORTIC VALVE REPLACEMENT, MECHANICAL: Primary | ICD-10-CM

## 2024-01-23 LAB — INR PPP: 1.8 (ref 2.5–3.5)

## 2024-01-23 PROCEDURE — 36416 COLLJ CAPILLARY BLOOD SPEC: CPT | Performed by: FAMILY MEDICINE

## 2024-01-23 PROCEDURE — 85610 PROTHROMBIN TIME: CPT | Performed by: FAMILY MEDICINE

## 2024-01-23 PROCEDURE — 93793 ANTICOAG MGMT PT WARFARIN: CPT | Performed by: FAMILY MEDICINE

## 2024-01-25 ENCOUNTER — NUTRITION (OUTPATIENT)
Dept: DIABETES SERVICES | Facility: HOSPITAL | Age: 59
End: 2024-01-25
Payer: COMMERCIAL

## 2024-01-25 DIAGNOSIS — E11.9 TYPE 2 DIABETES MELLITUS WITHOUT COMPLICATION, WITHOUT LONG-TERM CURRENT USE OF INSULIN: Primary | ICD-10-CM

## 2024-01-25 PROCEDURE — 97802 MEDICAL NUTRITION INDIV IN: CPT | Performed by: DIETITIAN, REGISTERED

## 2024-02-05 ENCOUNTER — ANTICOAGULATION VISIT (OUTPATIENT)
Dept: FAMILY MEDICINE CLINIC | Age: 59
End: 2024-02-05
Payer: COMMERCIAL

## 2024-02-05 VITALS — HEART RATE: 96 BPM | DIASTOLIC BLOOD PRESSURE: 85 MMHG | SYSTOLIC BLOOD PRESSURE: 129 MMHG

## 2024-02-05 DIAGNOSIS — Z95.2 HX OF AORTIC VALVE REPLACEMENT, MECHANICAL: Primary | ICD-10-CM

## 2024-02-05 LAB — INR PPP: 2.9 (ref 2.5–3.5)

## 2024-02-05 PROCEDURE — 93793 ANTICOAG MGMT PT WARFARIN: CPT | Performed by: FAMILY MEDICINE

## 2024-02-05 PROCEDURE — 85610 PROTHROMBIN TIME: CPT | Performed by: FAMILY MEDICINE

## 2024-02-05 PROCEDURE — 36416 COLLJ CAPILLARY BLOOD SPEC: CPT | Performed by: FAMILY MEDICINE

## 2024-02-20 VITALS — HEIGHT: 71 IN | WEIGHT: 275.6 LBS | BODY MASS INDEX: 38.58 KG/M2

## 2024-02-20 NOTE — PROGRESS NOTES
"  Freedom Stevens is a 58 y.o. male who presents to [facility] for nutrition consult r/t diagnosis of T2DM.  Freedom Stevens is referred by Dr. Barber.    Past Medical History:   Diagnosis Date    Chest pain     Heart disease     High blood pressure     Hx of blood diseases     Kidney stone     Polycystic kidney disease     Sleep apnea        Anthropometrics    180.3 cm (71\")  125 kg (275 lb 9.6 oz)  38.44 kg/m²    Diabetes History    Diabetes History  What type of diabetes do you have?: Type 2  Length of Diabetes Diagnosis: Newly diagnosed <6 months  Do you test your blood sugar at home?: yes  Frequency of checks: every 2-3 days  Who performs the test?: self  Typical readings: fasting 102-142    Education Preferences    Education Preferences  What areas of diabetes would you like to learn about?: diet information    Nutrition Information    Nutrition Information  Enter everything you can remember eating in the last 24 hours (1 day): breakfast- cereal; lunch- soups; dinner- spaghetti w/ meatballs; snacks- chips, peanuts, tuna; beverages- diet soda, water, black coffee  What is the biggest challenge you have with your diet?: Knowledge    Education Needs    DM Education Needs  Meter: Has own  Medication: Oral, Other injectables  Healthy Eating: RD consult, Reviewed meal plan, Basic meal plan provided  Motivation: Engaged  Teaching Method: Explanation, Discussion, Handouts  Patient Response: Verbalized understanding    DM Goals    DM Goals  Healthy Eating - Goal: Today  Being Active - Goal: Today  Taking Medication - Goal: Today  Monitoring - Goal: Today      Medications    Current Outpatient Medications   Medication    amLODIPine    Accu-Chek Guide Me    carvedilol    Blood Glucose Test Strips 333    glucose monitor    hydroCHLOROthiazide    Pen Needles    Lancets 28G    Victoza    lithium    losartan    metFORMIN    ondansetron ODT    pramipexole    pravastatin    risperiDONE    " tamsulosin    warfarin    warfarin     Labs       Lab Results   Component Value Date    CHOL 110 12/14/2023    TRIG 87 12/14/2023    HDL 31 (L) 12/14/2023    LDL 62 12/14/2023 December 2023 A1c 7.4%    Nutrition counseling provided on carbohydrate counting, portion control, measuring and reading labels.  Discussed eating out and gave suggestions on controlling carbohydrate intake and making healthier food choices.     Meal Plan:     Total Carbohydrates per meal: 3-4 carb servings/meal, 3 meals/day  Lean protein with meals.  Limit added fats.  Snacks: 1 carbohydrate serving (</= 22 g) + 1 protein serving.     Daily exercise encouraged (as recommended by healthcare provider). Discussed the benefits of exercise in lowering blood glucose, blood pressure, cholesterol, stress and controlling body weight.     Advised to continue daily blood glucose monitoring to assist with understanding of factors affecting blood glucose and assist with management of diabetes.  Discussed and provided with target BG ranges.     Literature provided: Diabetes Nutrition Placemat, Choose Your Foods Booklet    Dietitian contact number provided.  Patient encouraged to call with questions or concerns.     Time spent with patient: 15 minutes    Leatha Martínez RDN, LD  01/25/2024

## 2024-02-21 ENCOUNTER — ANTICOAGULATION VISIT (OUTPATIENT)
Dept: FAMILY MEDICINE CLINIC | Age: 59
End: 2024-02-21
Payer: COMMERCIAL

## 2024-02-21 VITALS — HEART RATE: 53 BPM | SYSTOLIC BLOOD PRESSURE: 129 MMHG | DIASTOLIC BLOOD PRESSURE: 72 MMHG

## 2024-02-21 DIAGNOSIS — Z95.2 HX OF AORTIC VALVE REPLACEMENT, MECHANICAL: Primary | ICD-10-CM

## 2024-02-21 LAB — INR PPP: 3.9 (ref 2.5–3.5)

## 2024-02-21 PROCEDURE — 85610 PROTHROMBIN TIME: CPT | Performed by: FAMILY MEDICINE

## 2024-02-21 PROCEDURE — 36416 COLLJ CAPILLARY BLOOD SPEC: CPT | Performed by: FAMILY MEDICINE

## 2024-02-21 PROCEDURE — 93793 ANTICOAG MGMT PT WARFARIN: CPT | Performed by: FAMILY MEDICINE

## 2024-02-28 ENCOUNTER — ANTICOAGULATION VISIT (OUTPATIENT)
Dept: FAMILY MEDICINE CLINIC | Age: 59
End: 2024-02-28
Payer: COMMERCIAL

## 2024-02-28 VITALS — DIASTOLIC BLOOD PRESSURE: 84 MMHG | SYSTOLIC BLOOD PRESSURE: 127 MMHG | HEART RATE: 102 BPM

## 2024-02-28 DIAGNOSIS — Z95.2 HX OF AORTIC VALVE REPLACEMENT, MECHANICAL: Primary | ICD-10-CM

## 2024-02-28 LAB — INR PPP: 3.4 (ref 2.5–3.5)

## 2024-02-28 PROCEDURE — 85610 PROTHROMBIN TIME: CPT | Performed by: FAMILY MEDICINE

## 2024-02-28 PROCEDURE — 36416 COLLJ CAPILLARY BLOOD SPEC: CPT | Performed by: FAMILY MEDICINE

## 2024-02-28 PROCEDURE — 93793 ANTICOAG MGMT PT WARFARIN: CPT | Performed by: FAMILY MEDICINE

## 2024-03-13 ENCOUNTER — ANTICOAGULATION VISIT (OUTPATIENT)
Dept: FAMILY MEDICINE CLINIC | Age: 59
End: 2024-03-13
Payer: COMMERCIAL

## 2024-03-13 VITALS — SYSTOLIC BLOOD PRESSURE: 122 MMHG | HEART RATE: 75 BPM | DIASTOLIC BLOOD PRESSURE: 78 MMHG

## 2024-03-13 DIAGNOSIS — Z95.2 HX OF AORTIC VALVE REPLACEMENT, MECHANICAL: Primary | ICD-10-CM

## 2024-03-13 LAB — INR PPP: 3.1 (ref 2.5–3.5)

## 2024-03-13 PROCEDURE — 93793 ANTICOAG MGMT PT WARFARIN: CPT | Performed by: FAMILY MEDICINE

## 2024-03-13 PROCEDURE — 85610 PROTHROMBIN TIME: CPT | Performed by: FAMILY MEDICINE

## 2024-03-13 PROCEDURE — 36416 COLLJ CAPILLARY BLOOD SPEC: CPT | Performed by: FAMILY MEDICINE

## 2024-03-18 DIAGNOSIS — Z95.2 HX OF AORTIC VALVE REPLACEMENT, MECHANICAL: ICD-10-CM

## 2024-03-18 RX ORDER — WARFARIN SODIUM 5 MG/1
TABLET ORAL
Qty: 132 TABLET | Refills: 1 | Status: SHIPPED | OUTPATIENT
Start: 2024-03-18

## 2024-03-25 DIAGNOSIS — I10 ESSENTIAL HYPERTENSION: ICD-10-CM

## 2024-03-25 RX ORDER — WARFARIN SODIUM 1 MG/1
TABLET ORAL
Qty: 90 TABLET | Refills: 0 | Status: SHIPPED | OUTPATIENT
Start: 2024-03-25

## 2024-03-26 RX ORDER — AMLODIPINE BESYLATE 10 MG/1
10 TABLET ORAL DAILY
Qty: 90 TABLET | Refills: 0 | Status: SHIPPED | OUTPATIENT
Start: 2024-03-26

## 2024-04-03 ENCOUNTER — ANTICOAGULATION VISIT (OUTPATIENT)
Dept: FAMILY MEDICINE CLINIC | Age: 59
End: 2024-04-03
Payer: COMMERCIAL

## 2024-04-03 VITALS — SYSTOLIC BLOOD PRESSURE: 130 MMHG | DIASTOLIC BLOOD PRESSURE: 78 MMHG | HEART RATE: 79 BPM

## 2024-04-03 DIAGNOSIS — Z95.2 HX OF AORTIC VALVE REPLACEMENT, MECHANICAL: Primary | ICD-10-CM

## 2024-04-03 LAB — INR PPP: 2.9 (ref 2.5–3.5)

## 2024-04-03 PROCEDURE — 85610 PROTHROMBIN TIME: CPT | Performed by: FAMILY MEDICINE

## 2024-04-03 PROCEDURE — 36416 COLLJ CAPILLARY BLOOD SPEC: CPT | Performed by: FAMILY MEDICINE

## 2024-04-03 PROCEDURE — 93793 ANTICOAG MGMT PT WARFARIN: CPT | Performed by: FAMILY MEDICINE

## 2024-04-11 ENCOUNTER — TRANSCRIBE ORDERS (OUTPATIENT)
Dept: ADMINISTRATIVE | Facility: HOSPITAL | Age: 59
End: 2024-04-11
Payer: COMMERCIAL

## 2024-04-11 ENCOUNTER — OFFICE VISIT (OUTPATIENT)
Dept: FAMILY MEDICINE CLINIC | Age: 59
End: 2024-04-11
Payer: COMMERCIAL

## 2024-04-11 ENCOUNTER — LAB (OUTPATIENT)
Dept: LAB | Facility: HOSPITAL | Age: 59
End: 2024-04-11
Payer: COMMERCIAL

## 2024-04-11 VITALS
OXYGEN SATURATION: 95 % | HEART RATE: 76 BPM | WEIGHT: 283.6 LBS | HEIGHT: 71 IN | SYSTOLIC BLOOD PRESSURE: 122 MMHG | BODY MASS INDEX: 39.7 KG/M2 | DIASTOLIC BLOOD PRESSURE: 79 MMHG

## 2024-04-11 DIAGNOSIS — E11.9 TYPE 2 DIABETES MELLITUS WITHOUT COMPLICATION, WITHOUT LONG-TERM CURRENT USE OF INSULIN: Primary | ICD-10-CM

## 2024-04-11 DIAGNOSIS — F31.31 MILD DEPRESSED BIPOLAR I DISORDER: ICD-10-CM

## 2024-04-11 DIAGNOSIS — Z79.899 ENCOUNTER FOR LONG-TERM (CURRENT) USE OF OTHER MEDICATIONS: Primary | ICD-10-CM

## 2024-04-11 DIAGNOSIS — Z95.2 HX OF AORTIC VALVE REPLACEMENT, MECHANICAL: ICD-10-CM

## 2024-04-11 DIAGNOSIS — F31.61 BIPOLAR DISORDER, CURRENT EPISODE MIXED, MILD: ICD-10-CM

## 2024-04-11 DIAGNOSIS — Z79.899 ENCOUNTER FOR LONG-TERM (CURRENT) USE OF OTHER MEDICATIONS: ICD-10-CM

## 2024-04-11 DIAGNOSIS — I10 ESSENTIAL HYPERTENSION: ICD-10-CM

## 2024-04-11 DIAGNOSIS — E78.2 MIXED HYPERLIPIDEMIA: ICD-10-CM

## 2024-04-11 DIAGNOSIS — G47.33 OBSTRUCTIVE SLEEP APNEA SYNDROME: ICD-10-CM

## 2024-04-11 DIAGNOSIS — I71.21 ANEURYSM OF ASCENDING AORTA WITHOUT RUPTURE: ICD-10-CM

## 2024-04-11 DIAGNOSIS — F41.1 GENERALIZED ANXIETY DISORDER: ICD-10-CM

## 2024-04-11 DIAGNOSIS — E11.9 TYPE 2 DIABETES MELLITUS WITHOUT COMPLICATION, WITHOUT LONG-TERM CURRENT USE OF INSULIN: ICD-10-CM

## 2024-04-11 DIAGNOSIS — Q61.9 CONGENITAL CYSTIC KIDNEY DISEASE: ICD-10-CM

## 2024-04-11 PROBLEM — Z00.00 ANNUAL PHYSICAL EXAM: Status: RESOLVED | Noted: 2022-09-23 | Resolved: 2024-04-11

## 2024-04-11 LAB
ALBUMIN SERPL-MCNC: 4.5 G/DL (ref 3.5–5.2)
ALBUMIN/GLOB SERPL: 1.5 G/DL
ALP SERPL-CCNC: 53 U/L (ref 39–117)
ALT SERPL W P-5'-P-CCNC: 30 U/L (ref 1–41)
AMPHET+METHAMPHET UR QL: NEGATIVE
ANION GAP SERPL CALCULATED.3IONS-SCNC: 9.7 MMOL/L (ref 5–15)
AST SERPL-CCNC: 35 U/L (ref 1–40)
BARBITURATES UR QL SCN: NEGATIVE
BASOPHILS # BLD AUTO: 0.03 10*3/MM3 (ref 0–0.2)
BASOPHILS NFR BLD AUTO: 0.6 % (ref 0–1.5)
BENZODIAZ UR QL SCN: NEGATIVE
BILIRUB SERPL-MCNC: 0.5 MG/DL (ref 0–1.2)
BUN SERPL-MCNC: 15 MG/DL (ref 8–23)
BUN/CREAT SERPL: 17 (ref 7–25)
CALCIUM SPEC-SCNC: 10.1 MG/DL (ref 8.6–10.5)
CANNABINOIDS SERPL QL: NEGATIVE
CHLORIDE SERPL-SCNC: 103 MMOL/L (ref 98–107)
CHOLEST SERPL-MCNC: 115 MG/DL (ref 0–200)
CO2 SERPL-SCNC: 27.3 MMOL/L (ref 22–29)
COCAINE UR QL: NEGATIVE
CREAT SERPL-MCNC: 0.88 MG/DL (ref 0.76–1.27)
DEPRECATED RDW RBC AUTO: 42 FL (ref 37–54)
EGFRCR SERPLBLD CKD-EPI 2021: 98.4 ML/MIN/1.73
EOSINOPHIL # BLD AUTO: 0.31 10*3/MM3 (ref 0–0.4)
EOSINOPHIL NFR BLD AUTO: 6.1 % (ref 0.3–6.2)
ERYTHROCYTE [DISTWIDTH] IN BLOOD BY AUTOMATED COUNT: 12.7 % (ref 12.3–15.4)
FENTANYL UR-MCNC: NEGATIVE NG/ML
GLOBULIN UR ELPH-MCNC: 3.1 GM/DL
GLUCOSE SERPL-MCNC: 137 MG/DL (ref 65–99)
HBA1C MFR BLD: 6.8 % (ref 4.8–5.6)
HCT VFR BLD AUTO: 40.9 % (ref 37.5–51)
HDLC SERPL-MCNC: 31 MG/DL (ref 40–60)
HGB BLD-MCNC: 13.5 G/DL (ref 13–17.7)
IMM GRANULOCYTES # BLD AUTO: 0.01 10*3/MM3 (ref 0–0.05)
IMM GRANULOCYTES NFR BLD AUTO: 0.2 % (ref 0–0.5)
LDLC SERPL CALC-MCNC: 55 MG/DL (ref 0–100)
LDLC/HDLC SERPL: 1.63 {RATIO}
LYMPHOCYTES # BLD AUTO: 0.95 10*3/MM3 (ref 0.7–3.1)
LYMPHOCYTES NFR BLD AUTO: 18.6 % (ref 19.6–45.3)
MCH RBC QN AUTO: 29.3 PG (ref 26.6–33)
MCHC RBC AUTO-ENTMCNC: 33 G/DL (ref 31.5–35.7)
MCV RBC AUTO: 88.9 FL (ref 79–97)
METHADONE UR QL SCN: NEGATIVE
MONOCYTES # BLD AUTO: 0.57 10*3/MM3 (ref 0.1–0.9)
MONOCYTES NFR BLD AUTO: 11.2 % (ref 5–12)
NEUTROPHILS NFR BLD AUTO: 3.24 10*3/MM3 (ref 1.7–7)
NEUTROPHILS NFR BLD AUTO: 63.3 % (ref 42.7–76)
OPIATES UR QL: NEGATIVE
OXYCODONE UR QL SCN: NEGATIVE
PLATELET # BLD AUTO: 206 10*3/MM3 (ref 140–450)
PMV BLD AUTO: 10.4 FL (ref 6–12)
POTASSIUM SERPL-SCNC: 3.9 MMOL/L (ref 3.5–5.2)
PROT SERPL-MCNC: 7.6 G/DL (ref 6–8.5)
RBC # BLD AUTO: 4.6 10*6/MM3 (ref 4.14–5.8)
SODIUM SERPL-SCNC: 140 MMOL/L (ref 136–145)
T3 SERPL-MCNC: 104 NG/DL (ref 80–200)
T4 SERPL-MCNC: 7.45 MCG/DL (ref 4.5–11.7)
TRIGL SERPL-MCNC: 168 MG/DL (ref 0–150)
TSH SERPL DL<=0.05 MIU/L-ACNC: 2.83 UIU/ML (ref 0.27–4.2)
VLDLC SERPL-MCNC: 29 MG/DL (ref 5–40)
WBC NRBC COR # BLD AUTO: 5.11 10*3/MM3 (ref 3.4–10.8)

## 2024-04-11 PROCEDURE — 80307 DRUG TEST PRSMV CHEM ANLYZR: CPT

## 2024-04-11 PROCEDURE — 80050 GENERAL HEALTH PANEL: CPT

## 2024-04-11 PROCEDURE — 80061 LIPID PANEL: CPT

## 2024-04-11 PROCEDURE — 83036 HEMOGLOBIN GLYCOSYLATED A1C: CPT

## 2024-04-11 PROCEDURE — 80178 ASSAY OF LITHIUM: CPT

## 2024-04-11 PROCEDURE — 84436 ASSAY OF TOTAL THYROXINE: CPT

## 2024-04-11 PROCEDURE — 36415 COLL VENOUS BLD VENIPUNCTURE: CPT

## 2024-04-11 PROCEDURE — 84480 ASSAY TRIIODOTHYRONINE (T3): CPT

## 2024-04-11 RX ORDER — CARVEDILOL 12.5 MG/1
12.5 TABLET ORAL 2 TIMES DAILY WITH MEALS
Qty: 180 TABLET | Refills: 1 | Status: SHIPPED | OUTPATIENT
Start: 2024-04-11

## 2024-04-11 RX ORDER — AMLODIPINE BESYLATE 10 MG/1
10 TABLET ORAL DAILY
Qty: 90 TABLET | Refills: 1 | Status: SHIPPED | OUTPATIENT
Start: 2024-04-11

## 2024-04-11 RX ORDER — TEMAZEPAM 30 MG/1
30 CAPSULE ORAL NIGHTLY PRN
COMMUNITY
Start: 2024-03-07

## 2024-04-11 RX ORDER — LOSARTAN POTASSIUM 25 MG/1
50 TABLET ORAL 2 TIMES DAILY
Qty: 360 TABLET | Refills: 1 | Status: SHIPPED | OUTPATIENT
Start: 2024-04-11

## 2024-04-11 RX ORDER — PRAVASTATIN SODIUM 20 MG
20 TABLET ORAL
Qty: 90 TABLET | Refills: 1 | Status: SHIPPED | OUTPATIENT
Start: 2024-04-11

## 2024-04-11 RX ORDER — HYDROCHLOROTHIAZIDE 50 MG/1
50 TABLET ORAL DAILY
Qty: 90 TABLET | Refills: 1 | Status: SHIPPED | OUTPATIENT
Start: 2024-04-11

## 2024-04-11 NOTE — ASSESSMENT & PLAN NOTE
Blood pressure has been running at goal.  Continue amlodipine 10 mg daily, HCTZ 50 mg daily, losartan 50 mg twice daily.  Refills were needed today.  Labs were needed today.  Continue to monitor.

## 2024-04-11 NOTE — ASSESSMENT & PLAN NOTE
He is on metformin and Victoza for diabetes.  No refills needed.  Last A1c 7.4 in Dec, at which time Victoza was added.  He lost 25 lbs.  He has been checking BG fasting and it has been running 150-200s.  He is on a statin and ARB.  He is UTD on foot exam (4/2023); exam normal today.  He is UTD on eye exam, last done 1/2024.  Checking labs today.

## 2024-04-11 NOTE — ASSESSMENT & PLAN NOTE
Stable on pravastatin 20 mg daily.  Refills were needed today.  Labs were due today.  Continue to monitor.

## 2024-04-11 NOTE — PROGRESS NOTES
Chief Complaint  Diabetes (4 month follow up/)    Subjective          Freedom Stevens presents to [facility] today for routine follow-up.    He is on metformin and Victoza for diabetes.  Last A1c 7.4 in Dec, at which time Victoza was added.  He lost 25 lbs.  He has been checking BG fasting and it has been running 150-200s.  He is on a statin and ARB.  He is UTD on foot exam (4/2023).  He is UTD on eye exam, last done 1/2024.     He is currently on HCTZ, carvedilol, amlodipine, and losartan for HTN.  His BP has been well controlled.  Denies chest pain, palpitations, or shortness of breath.     He is on pravastatin for HLD.  Denies myalgias.      He is on warfarin for anticoagulation of a mechanical aortic valve.  He follows with Dr. [facility].  We are managing the INR.    He is following yearly with CT surgery for a 4.7 cm fusiform ascending aortic aneurysm.  He does also follow with Dr. Tellez [facility].  He already has a history of ascending aortic aneurysm repair and aortic valve replacement.      He is following with Dr. Mccauley for bipolar disorder with depressed mood and anxiety.  He is on a regimen of lithium, temazepam, risperidone, and pramipexole.  Temazepam added last visit for insomnia.  He has been having trouble sleeping since his wife Maria Elena left him about a month ago.        He has DORIAN.  Unable to tolerate CPAP.      Current Outpatient Medications:     amLODIPine (NORVASC) 10 MG tablet, Take 1 tablet by mouth once daily, Disp: 90 tablet, Rfl: 0    Blood Glucose Monitoring Suppl (Accu-Chek Guide Me) w/Device kit, USE 1 UNIT TO CHECK GLUCOSE ONCE DAILY, Disp: , Rfl:     carvedilol (COREG) 12.5 MG tablet, Take 1 tablet by mouth 2 (Two) Times a Day With Meals., Disp: 180 tablet, Rfl: 1    Glucose Blood (Blood Glucose Test Strips 333) strip, Use 1 each Daily. DX E11.9, Disp: 300 strip, Rfl: 1    glucose monitor monitoring kit, Use 1 each Daily. DX  E11.9, Disp: 1 each, Rfl: 0    hydroCHLOROthiazide (HYDRODIURIL) 50 MG tablet, Take 1 tablet by mouth once daily, Disp: 90 tablet, Rfl: 1    Insulin Pen Needle (Pen Needles) 32G X 4 MM misc, Use 1 each Daily. Daily with Victoza, Disp: 100 each, Rfl: 3    Lancets 28G misc, Use 1 each Daily. DX E11.9, Disp: 300 each, Rfl: 1    Liraglutide (Victoza) 18 MG/3ML solution pen-injector injection, INJECT 1.2MG UNDER SKIN INTO APPROPROPRIATE AREA AS DIRECTED., Disp: 6 mL, Rfl: 5    lithium (ESKALITH) 450 MG CR tablet, Take 1 tablet by mouth 2 (Two) Times a Day., Disp: , Rfl:     losartan (COZAAR) 25 MG tablet, Take 2 tablets by mouth 2 (Two) Times a Day., Disp: 360 tablet, Rfl: 1    metFORMIN (GLUCOPHAGE) 500 MG tablet, Take 1 tablet by mouth Daily., Disp: 90 tablet, Rfl: 1    ondansetron ODT (ZOFRAN-ODT) 4 MG disintegrating tablet, Place 1 tablet on the tongue Every 8 (Eight) Hours As Needed for Nausea or Vomiting., Disp: 20 tablet, Rfl: 0    pramipexole (MIRAPEX) 0.5 MG tablet, Take 1 tablet by mouth 2 (Two) Times a Day., Disp: , Rfl:     pravastatin (PRAVACHOL) 20 MG tablet, Take 1 tablet by mouth every night at bedtime., Disp: 90 tablet, Rfl: 1    risperiDONE (risperDAL) 2 MG tablet, Take 1 tablet by mouth Daily., Disp: , Rfl:     tamsulosin (FLOMAX) 0.4 MG capsule 24 hr capsule, Take 1 capsule by mouth Daily for 360 days., Disp: 90 capsule, Rfl: 3    temazepam (RESTORIL) 30 MG capsule, Take 1 capsule by mouth At Night As Needed., Disp: , Rfl:     warfarin (COUMADIN) 1 MG tablet, Take 10mg X 4 days a week and 6mg the other 3 days of the week, Disp: 90 tablet, Rfl: 0    warfarin (COUMADIN) 5 MG tablet, Take 10mg 4 days a week and 6mg 3 days a week, Disp: 132 tablet, Rfl: 1  There are no discontinued medications.      Allergies:  Shellfish-derived products      Objective   Vital Signs:   Vitals:    04/11/24 0905   BP: 122/79   BP Location: Right arm   Patient Position: Sitting   Pulse: 76   SpO2: 95%  Comment: room air  "  Weight: 129 kg (283 lb 9.6 oz)   Height: 180.3 cm (71\")     Body mass index is 39.55 kg/m².           Physical Exam  Vitals reviewed.   Constitutional:       General: He is not in acute distress.     Appearance: Normal appearance. He is well-developed.   HENT:      Head: Normocephalic and atraumatic.      Right Ear: External ear normal.      Left Ear: External ear normal.   Eyes:      Extraocular Movements: Extraocular movements intact.      Conjunctiva/sclera: Conjunctivae normal.      Pupils: Pupils are equal, round, and reactive to light.   Cardiovascular:      Rate and Rhythm: Normal rate and regular rhythm.      Pulses:           Dorsalis pedis pulses are 2+ on the right side and 2+ on the left side.      Heart sounds: No murmur heard.  Pulmonary:      Effort: Pulmonary effort is normal.      Breath sounds: Normal breath sounds. No wheezing, rhonchi or rales.   Abdominal:      General: Bowel sounds are normal. There is no distension.      Palpations: Abdomen is soft.      Tenderness: There is no abdominal tenderness.   Musculoskeletal:         General: Normal range of motion.   Feet:      Right foot:      Protective Sensation: 3 sites tested.  3 sites sensed.      Skin integrity: Skin integrity normal. No ulcer or blister.      Toenail Condition: Right toenails are normal.      Left foot:      Protective Sensation: 3 sites tested.  3 sites sensed.      Skin integrity: Skin integrity normal. No ulcer or blister.      Toenail Condition: Left toenails are normal.      Comments: Diabetic Foot Exam Performed and Monofilament Test Performed     Neurological:      Mental Status: He is alert.   Psychiatric:         Mood and Affect: Affect normal.         Lab Results   Component Value Date    GLUCOSE 115 (H) 12/14/2023    BUN 16 12/14/2023    CREATININE 1.06 12/14/2023    EGFRIFNONA 70 12/07/2021    EGFRIFAFRI 92 01/08/2018    BCR 15.1 12/14/2023    K 3.9 12/14/2023    CO2 28.0 12/14/2023    CALCIUM 10.3 12/14/2023    " ALBUMIN 4.7 12/14/2023    LABIL2 1.5 03/11/2021    AST 24 12/14/2023    ALT 21 12/14/2023       Lab Results   Component Value Date    CHOL 110 12/14/2023    CHLPL 125 03/11/2021    TRIG 87 12/14/2023    HDL 31 (L) 12/14/2023    LDL 62 12/14/2023       Lab Results   Component Value Date    WBC 9.49 12/14/2023    HGB 12.5 (L) 12/14/2023    HCT 38.8 12/14/2023    MCV 90.4 12/14/2023     12/14/2023            Assessment and Plan    Assessment & Plan  Type 2 diabetes mellitus without complication, without long-term current use of insulin    He is on metformin and Victoza for diabetes.  No refills needed.  Last A1c 7.4 in Dec, at which time Victoza was added.  He lost 25 lbs.  He has been checking BG fasting and it has been running 150-200s.  He is on a statin and ARB.  He is UTD on foot exam (4/2023); exam normal today.  He is UTD on eye exam, last done 1/2024.  Checking labs today.  Essential hypertension    Blood pressure has been running at goal.  Continue amlodipine 10 mg daily, HCTZ 50 mg daily, losartan 50 mg twice daily.  Refills were needed today.  Labs were needed today.  Continue to monitor.    Mixed hyperlipidemia     Stable on pravastatin 20 mg daily.  Refills were needed today.  Labs were due today.  Continue to monitor.    Bipolar disorder, current episode mixed, mild  Following with Dr. Mccauley.  Stable on lithium, risperidone, temazepam, and pramipexole.   Generalized anxiety disorder  With Dr. Mccauley.  As per bipolar plan.  Aneurysm of ascending aorta without rupture  He was evaluated by CT Surgery who recommended routine follow-up in 1 year.  They over read the CT report as a fusiform ascending aortic aneurysm of 4.5 as opposed to 4.7 cm.  History of ascending aortic aneurysm repair and aortic valve replacement.  Congenital cystic kidney disease    Stable.  Continue to monitor.    Hx of aortic valve replacement, mechanical  On warfarin.  We are managing.  Last INR 4/3/2024 was  therapeutic.  Obstructive sleep apnea syndrome  Unable to tolerate CPAP.                  Follow Up   No follow-ups on file.  Patient was given instructions and counseling regarding his condition or for health maintenance advice. Please see specific information pulled into the AVS if appropriate.           04/11/2024

## 2024-04-11 NOTE — ASSESSMENT & PLAN NOTE
He was evaluated by CT Surgery who recommended routine follow-up in 1 year.  They over read the CT report as a fusiform ascending aortic aneurysm of 4.5 as opposed to 4.7 cm.  History of ascending aortic aneurysm repair and aortic valve replacement.

## 2024-04-12 LAB — LITHIUM SERPL-SCNC: 1 MMOL/L (ref 0.6–1.2)

## 2024-04-16 ENCOUNTER — TELEPHONE (OUTPATIENT)
Dept: FAMILY MEDICINE CLINIC | Age: 59
End: 2024-04-16
Payer: COMMERCIAL

## 2024-04-16 NOTE — TELEPHONE ENCOUNTER
No, his hemoglobin A1c or 3 month average of blood sugars came back looking great at 6.8, so we will keep him at his present dose.  See result note travis for full details of lab interpretation.  Thanks, BZMIGUEL

## 2024-04-16 NOTE — TELEPHONE ENCOUNTER
"    Caller: Freedom Stevens \"Toro\"    Relationship: Self    Best call back number: [phone]    What test was performed: BLOOD WORK     When was the test performed: 4.11.24    Where was the test performed: IN OFFICE     "

## 2024-04-17 ENCOUNTER — TELEPHONE (OUTPATIENT)
Dept: GASTROENTEROLOGY | Facility: CLINIC | Age: 59
End: 2024-04-17
Payer: COMMERCIAL

## 2024-04-17 NOTE — TELEPHONE ENCOUNTER
Attempted to contact Freedom Stevens 1965 regarding the appointment no show with SHAZIA Flores on 4/16/24 @ 8:15 am. Patient is aware that there is a 24-hour cancellation policy and understands that a no-show letter will be mailed to them at the address on file.

## 2024-05-01 ENCOUNTER — ANTICOAGULATION VISIT (OUTPATIENT)
Dept: FAMILY MEDICINE CLINIC | Age: 59
End: 2024-05-01
Payer: COMMERCIAL

## 2024-05-01 VITALS — DIASTOLIC BLOOD PRESSURE: 72 MMHG | SYSTOLIC BLOOD PRESSURE: 122 MMHG | HEART RATE: 76 BPM

## 2024-05-01 DIAGNOSIS — Z95.2 HX OF AORTIC VALVE REPLACEMENT, MECHANICAL: Primary | ICD-10-CM

## 2024-05-01 LAB — INR PPP: 2.5 (ref 2.5–3.5)

## 2024-05-01 PROCEDURE — 93793 ANTICOAG MGMT PT WARFARIN: CPT | Performed by: FAMILY MEDICINE

## 2024-05-01 PROCEDURE — 85610 PROTHROMBIN TIME: CPT | Performed by: FAMILY MEDICINE

## 2024-05-01 PROCEDURE — 36416 COLLJ CAPILLARY BLOOD SPEC: CPT | Performed by: FAMILY MEDICINE

## 2024-05-29 ENCOUNTER — ANTICOAGULATION VISIT (OUTPATIENT)
Dept: FAMILY MEDICINE CLINIC | Age: 59
End: 2024-05-29
Payer: COMMERCIAL

## 2024-05-29 VITALS — HEART RATE: 69 BPM | SYSTOLIC BLOOD PRESSURE: 129 MMHG | DIASTOLIC BLOOD PRESSURE: 80 MMHG

## 2024-05-29 DIAGNOSIS — Z95.2 HX OF AORTIC VALVE REPLACEMENT, MECHANICAL: Primary | ICD-10-CM

## 2024-05-29 LAB — INR PPP: 4.1 (ref 2.5–3.5)

## 2024-05-29 PROCEDURE — 93793 ANTICOAG MGMT PT WARFARIN: CPT | Performed by: FAMILY MEDICINE

## 2024-05-29 PROCEDURE — 36416 COLLJ CAPILLARY BLOOD SPEC: CPT | Performed by: FAMILY MEDICINE

## 2024-05-29 PROCEDURE — 85610 PROTHROMBIN TIME: CPT | Performed by: FAMILY MEDICINE

## 2024-06-12 ENCOUNTER — ANTICOAGULATION VISIT (OUTPATIENT)
Dept: FAMILY MEDICINE CLINIC | Age: 59
End: 2024-06-12
Payer: COMMERCIAL

## 2024-06-12 VITALS — DIASTOLIC BLOOD PRESSURE: 77 MMHG | SYSTOLIC BLOOD PRESSURE: 123 MMHG | HEART RATE: 71 BPM

## 2024-06-12 DIAGNOSIS — Z95.2 HX OF AORTIC VALVE REPLACEMENT, MECHANICAL: Primary | ICD-10-CM

## 2024-06-12 LAB — INR PPP: 3.4 (ref 2.5–3.5)

## 2024-06-12 PROCEDURE — 85610 PROTHROMBIN TIME: CPT | Performed by: FAMILY MEDICINE

## 2024-06-12 PROCEDURE — 93793 ANTICOAG MGMT PT WARFARIN: CPT | Performed by: FAMILY MEDICINE

## 2024-06-12 PROCEDURE — 36416 COLLJ CAPILLARY BLOOD SPEC: CPT | Performed by: FAMILY MEDICINE

## 2024-06-26 ENCOUNTER — ANTICOAGULATION VISIT (OUTPATIENT)
Dept: FAMILY MEDICINE CLINIC | Age: 59
End: 2024-06-26
Payer: COMMERCIAL

## 2024-06-26 VITALS — DIASTOLIC BLOOD PRESSURE: 76 MMHG | HEART RATE: 69 BPM | SYSTOLIC BLOOD PRESSURE: 126 MMHG

## 2024-06-26 DIAGNOSIS — Z95.2 HX OF AORTIC VALVE REPLACEMENT, MECHANICAL: Primary | ICD-10-CM

## 2024-06-26 LAB — INR PPP: 3.4 (ref 2.5–3.5)

## 2024-06-26 PROCEDURE — 36416 COLLJ CAPILLARY BLOOD SPEC: CPT | Performed by: FAMILY MEDICINE

## 2024-06-26 PROCEDURE — 85610 PROTHROMBIN TIME: CPT | Performed by: FAMILY MEDICINE

## 2024-06-26 PROCEDURE — 93793 ANTICOAG MGMT PT WARFARIN: CPT | Performed by: FAMILY MEDICINE

## 2024-07-07 DIAGNOSIS — E11.9 TYPE 2 DIABETES MELLITUS WITHOUT COMPLICATION, WITHOUT LONG-TERM CURRENT USE OF INSULIN: ICD-10-CM

## 2024-07-08 RX ORDER — LIRAGLUTIDE 6 MG/ML
INJECTION SUBCUTANEOUS
Qty: 6 ML | Refills: 0 | Status: SHIPPED | OUTPATIENT
Start: 2024-07-08

## 2024-07-17 ENCOUNTER — ANTICOAGULATION VISIT (OUTPATIENT)
Dept: FAMILY MEDICINE CLINIC | Age: 59
End: 2024-07-17
Payer: COMMERCIAL

## 2024-07-17 VITALS — HEART RATE: 79 BPM | DIASTOLIC BLOOD PRESSURE: 81 MMHG | SYSTOLIC BLOOD PRESSURE: 120 MMHG

## 2024-07-17 DIAGNOSIS — Z95.2 HX OF AORTIC VALVE REPLACEMENT, MECHANICAL: Primary | ICD-10-CM

## 2024-07-17 LAB — INR PPP: 2.7 (ref 2.5–3.5)

## 2024-07-17 PROCEDURE — 36416 COLLJ CAPILLARY BLOOD SPEC: CPT | Performed by: FAMILY MEDICINE

## 2024-07-17 PROCEDURE — 93793 ANTICOAG MGMT PT WARFARIN: CPT | Performed by: FAMILY MEDICINE

## 2024-07-17 PROCEDURE — 85610 PROTHROMBIN TIME: CPT | Performed by: FAMILY MEDICINE

## 2024-08-06 DIAGNOSIS — E11.9 TYPE 2 DIABETES MELLITUS WITHOUT COMPLICATION, WITHOUT LONG-TERM CURRENT USE OF INSULIN: ICD-10-CM

## 2024-08-06 RX ORDER — LIRAGLUTIDE 6 MG/ML
INJECTION SUBCUTANEOUS
Qty: 6 ML | Refills: 0 | Status: SHIPPED | OUTPATIENT
Start: 2024-08-06

## 2024-08-07 ENCOUNTER — ANTICOAGULATION VISIT (OUTPATIENT)
Dept: FAMILY MEDICINE CLINIC | Age: 59
End: 2024-08-07
Payer: COMMERCIAL

## 2024-08-07 VITALS — HEART RATE: 71 BPM | SYSTOLIC BLOOD PRESSURE: 140 MMHG | DIASTOLIC BLOOD PRESSURE: 84 MMHG

## 2024-08-07 DIAGNOSIS — Z95.2 HX OF AORTIC VALVE REPLACEMENT, MECHANICAL: Primary | ICD-10-CM

## 2024-08-07 LAB — INR PPP: 3.1 (ref 2.5–3.5)

## 2024-08-07 PROCEDURE — 85610 PROTHROMBIN TIME: CPT | Performed by: FAMILY MEDICINE

## 2024-08-07 PROCEDURE — 93793 ANTICOAG MGMT PT WARFARIN: CPT | Performed by: FAMILY MEDICINE

## 2024-08-07 PROCEDURE — 36416 COLLJ CAPILLARY BLOOD SPEC: CPT | Performed by: FAMILY MEDICINE

## 2024-08-11 DIAGNOSIS — E11.9 TYPE 2 DIABETES MELLITUS WITHOUT COMPLICATION, WITHOUT LONG-TERM CURRENT USE OF INSULIN: ICD-10-CM

## 2024-08-24 DIAGNOSIS — Z95.2 HX OF AORTIC VALVE REPLACEMENT, MECHANICAL: ICD-10-CM

## 2024-08-27 ENCOUNTER — HOSPITAL ENCOUNTER (OUTPATIENT)
Dept: CT IMAGING | Facility: HOSPITAL | Age: 59
Discharge: HOME | End: 2024-08-27
Admitting: UROLOGY
Payer: COMMERCIAL

## 2024-08-27 DIAGNOSIS — Q61.9 CONGENITAL CYSTIC KIDNEY DISEASE: ICD-10-CM

## 2024-08-27 LAB
CREAT BLDA-MCNC: 1 MG/DL (ref 0.6–1.3)
EGFRCR SERPLBLD CKD-EPI 2021: 86.2 ML/MIN/1.73

## 2024-08-27 PROCEDURE — 74178 CT ABD&PLV WO CNTR FLWD CNTR: CPT

## 2024-08-27 PROCEDURE — 82565 ASSAY OF CREATININE: CPT

## 2024-08-27 PROCEDURE — 25510000001 IOPAMIDOL PER 1 ML: Performed by: UROLOGY

## 2024-08-27 RX ORDER — WARFARIN SODIUM 5 MG/1
TABLET ORAL
Qty: 132 TABLET | Refills: 1 | Status: SHIPPED | OUTPATIENT
Start: 2024-08-27

## 2024-08-27 RX ORDER — IOPAMIDOL 755 MG/ML
100 INJECTION, SOLUTION INTRAVASCULAR
Status: COMPLETED | OUTPATIENT
Start: 2024-08-27 | End: 2024-08-27

## 2024-08-27 RX ADMIN — IOPAMIDOL 100 ML: 755 INJECTION, SOLUTION INTRAVENOUS at 09:43

## 2024-09-04 ENCOUNTER — ANTICOAGULATION VISIT (OUTPATIENT)
Dept: FAMILY MEDICINE CLINIC | Age: 59
End: 2024-09-04
Payer: COMMERCIAL

## 2024-09-04 VITALS — HEART RATE: 73 BPM | DIASTOLIC BLOOD PRESSURE: 79 MMHG | SYSTOLIC BLOOD PRESSURE: 142 MMHG

## 2024-09-04 DIAGNOSIS — Z95.2 HX OF AORTIC VALVE REPLACEMENT, MECHANICAL: Primary | ICD-10-CM

## 2024-09-04 LAB — INR PPP: 2.6 (ref 2.5–3.5)

## 2024-09-04 PROCEDURE — 85610 PROTHROMBIN TIME: CPT | Performed by: FAMILY MEDICINE

## 2024-09-04 PROCEDURE — 36416 COLLJ CAPILLARY BLOOD SPEC: CPT | Performed by: FAMILY MEDICINE

## 2024-09-04 PROCEDURE — 93793 ANTICOAG MGMT PT WARFARIN: CPT | Performed by: FAMILY MEDICINE

## 2024-09-26 ENCOUNTER — ANTICOAGULATION VISIT (OUTPATIENT)
Dept: FAMILY MEDICINE CLINIC | Age: 59
End: 2024-09-26
Payer: COMMERCIAL

## 2024-09-26 ENCOUNTER — LAB (OUTPATIENT)
Dept: LAB | Facility: HOSPITAL | Age: 59
End: 2024-09-26
Payer: COMMERCIAL

## 2024-09-26 ENCOUNTER — OFFICE VISIT (OUTPATIENT)
Dept: FAMILY MEDICINE CLINIC | Age: 59
End: 2024-09-26
Payer: COMMERCIAL

## 2024-09-26 VITALS
TEMPERATURE: 98.2 F | DIASTOLIC BLOOD PRESSURE: 71 MMHG | WEIGHT: 278 LBS | SYSTOLIC BLOOD PRESSURE: 123 MMHG | HEART RATE: 74 BPM | HEIGHT: 71 IN | BODY MASS INDEX: 38.92 KG/M2 | OXYGEN SATURATION: 92 %

## 2024-09-26 DIAGNOSIS — R07.89 OTHER CHEST PAIN: ICD-10-CM

## 2024-09-26 DIAGNOSIS — Z23 ENCOUNTER FOR IMMUNIZATION: ICD-10-CM

## 2024-09-26 DIAGNOSIS — G47.33 OBSTRUCTIVE SLEEP APNEA SYNDROME: ICD-10-CM

## 2024-09-26 DIAGNOSIS — Z95.2 HX OF AORTIC VALVE REPLACEMENT, MECHANICAL: ICD-10-CM

## 2024-09-26 DIAGNOSIS — Q61.9 CONGENITAL CYSTIC KIDNEY DISEASE: ICD-10-CM

## 2024-09-26 DIAGNOSIS — I10 ESSENTIAL HYPERTENSION: ICD-10-CM

## 2024-09-26 DIAGNOSIS — E11.9 TYPE 2 DIABETES MELLITUS WITHOUT COMPLICATION, WITHOUT LONG-TERM CURRENT USE OF INSULIN: Primary | ICD-10-CM

## 2024-09-26 DIAGNOSIS — Z95.2 HX OF AORTIC VALVE REPLACEMENT, MECHANICAL: Primary | ICD-10-CM

## 2024-09-26 DIAGNOSIS — I71.21 ANEURYSM OF ASCENDING AORTA WITHOUT RUPTURE: ICD-10-CM

## 2024-09-26 DIAGNOSIS — F41.1 GENERALIZED ANXIETY DISORDER: ICD-10-CM

## 2024-09-26 DIAGNOSIS — E78.2 MIXED HYPERLIPIDEMIA: ICD-10-CM

## 2024-09-26 DIAGNOSIS — F31.61 BIPOLAR DISORDER, CURRENT EPISODE MIXED, MILD: ICD-10-CM

## 2024-09-26 DIAGNOSIS — E11.9 TYPE 2 DIABETES MELLITUS WITHOUT COMPLICATION, WITHOUT LONG-TERM CURRENT USE OF INSULIN: ICD-10-CM

## 2024-09-26 LAB
ALBUMIN SERPL-MCNC: 4.6 G/DL (ref 3.5–5.2)
ALBUMIN UR-MCNC: 1.8 MG/DL
ALBUMIN/GLOB SERPL: 1.8 G/DL
ALP SERPL-CCNC: 52 U/L (ref 39–117)
ALT SERPL W P-5'-P-CCNC: 32 U/L (ref 1–41)
ANION GAP SERPL CALCULATED.3IONS-SCNC: 8 MMOL/L (ref 5–15)
AST SERPL-CCNC: 45 U/L (ref 1–40)
BASOPHILS # BLD AUTO: 0.04 10*3/MM3 (ref 0–0.2)
BASOPHILS NFR BLD AUTO: 0.7 % (ref 0–1.5)
BILIRUB SERPL-MCNC: 0.5 MG/DL (ref 0–1.2)
BUN SERPL-MCNC: 19 MG/DL (ref 8–23)
BUN/CREAT SERPL: 18.6 (ref 7–25)
CALCIUM SPEC-SCNC: 10.8 MG/DL (ref 8.6–10.5)
CHLORIDE SERPL-SCNC: 102 MMOL/L (ref 98–107)
CHOLEST SERPL-MCNC: 103 MG/DL (ref 0–200)
CO2 SERPL-SCNC: 26 MMOL/L (ref 22–29)
CREAT SERPL-MCNC: 1.02 MG/DL (ref 0.76–1.27)
CREAT UR-MCNC: 73.8 MG/DL
DEPRECATED RDW RBC AUTO: 46 FL (ref 37–54)
EGFRCR SERPLBLD CKD-EPI 2021: 84.1 ML/MIN/1.73
EOSINOPHIL # BLD AUTO: 0.38 10*3/MM3 (ref 0–0.4)
EOSINOPHIL NFR BLD AUTO: 6.5 % (ref 0.3–6.2)
ERYTHROCYTE [DISTWIDTH] IN BLOOD BY AUTOMATED COUNT: 13.4 % (ref 12.3–15.4)
GLOBULIN UR ELPH-MCNC: 2.6 GM/DL
GLUCOSE SERPL-MCNC: 102 MG/DL (ref 65–99)
HBA1C MFR BLD: 6.4 % (ref 4.8–5.6)
HCT VFR BLD AUTO: 40 % (ref 37.5–51)
HDLC SERPL-MCNC: 30 MG/DL (ref 40–60)
HGB BLD-MCNC: 13.2 G/DL (ref 13–17.7)
IMM GRANULOCYTES # BLD AUTO: 0.02 10*3/MM3 (ref 0–0.05)
IMM GRANULOCYTES NFR BLD AUTO: 0.3 % (ref 0–0.5)
INR PPP: 2.7 (ref 2.5–3.5)
LDLC SERPL CALC-MCNC: 41 MG/DL (ref 0–100)
LDLC/HDLC SERPL: 1.14 {RATIO}
LYMPHOCYTES # BLD AUTO: 0.87 10*3/MM3 (ref 0.7–3.1)
LYMPHOCYTES NFR BLD AUTO: 14.9 % (ref 19.6–45.3)
MCH RBC QN AUTO: 30.3 PG (ref 26.6–33)
MCHC RBC AUTO-ENTMCNC: 33 G/DL (ref 31.5–35.7)
MCV RBC AUTO: 91.7 FL (ref 79–97)
MICROALBUMIN/CREAT UR: 24.4 MG/G (ref 0–29)
MONOCYTES # BLD AUTO: 0.61 10*3/MM3 (ref 0.1–0.9)
MONOCYTES NFR BLD AUTO: 10.4 % (ref 5–12)
NEUTROPHILS NFR BLD AUTO: 3.92 10*3/MM3 (ref 1.7–7)
NEUTROPHILS NFR BLD AUTO: 67.2 % (ref 42.7–76)
PLATELET # BLD AUTO: 166 10*3/MM3 (ref 140–450)
PMV BLD AUTO: 10.2 FL (ref 6–12)
POTASSIUM SERPL-SCNC: 3.7 MMOL/L (ref 3.5–5.2)
PROT SERPL-MCNC: 7.2 G/DL (ref 6–8.5)
RBC # BLD AUTO: 4.36 10*6/MM3 (ref 4.14–5.8)
SODIUM SERPL-SCNC: 136 MMOL/L (ref 136–145)
TRIGL SERPL-MCNC: 194 MG/DL (ref 0–150)
VLDLC SERPL-MCNC: 32 MG/DL (ref 5–40)
WBC NRBC COR # BLD AUTO: 5.84 10*3/MM3 (ref 3.4–10.8)

## 2024-09-26 PROCEDURE — 82570 ASSAY OF URINE CREATININE: CPT

## 2024-09-26 PROCEDURE — 36416 COLLJ CAPILLARY BLOOD SPEC: CPT | Performed by: FAMILY MEDICINE

## 2024-09-26 PROCEDURE — 99214 OFFICE O/P EST MOD 30 MIN: CPT | Performed by: FAMILY MEDICINE

## 2024-09-26 PROCEDURE — 90656 IIV3 VACC NO PRSV 0.5 ML IM: CPT | Performed by: FAMILY MEDICINE

## 2024-09-26 PROCEDURE — 36415 COLL VENOUS BLD VENIPUNCTURE: CPT

## 2024-09-26 PROCEDURE — 85610 PROTHROMBIN TIME: CPT | Performed by: FAMILY MEDICINE

## 2024-09-26 PROCEDURE — 90471 IMMUNIZATION ADMIN: CPT | Performed by: FAMILY MEDICINE

## 2024-09-26 PROCEDURE — 80053 COMPREHEN METABOLIC PANEL: CPT

## 2024-09-26 PROCEDURE — 83036 HEMOGLOBIN GLYCOSYLATED A1C: CPT

## 2024-09-26 PROCEDURE — 82043 UR ALBUMIN QUANTITATIVE: CPT

## 2024-09-26 PROCEDURE — 85025 COMPLETE CBC W/AUTO DIFF WBC: CPT

## 2024-09-26 PROCEDURE — 80061 LIPID PANEL: CPT

## 2024-09-26 RX ORDER — FAMOTIDINE 40 MG/1
40 TABLET, FILM COATED ORAL NIGHTLY
Qty: 30 TABLET | Refills: 5 | Status: SHIPPED | OUTPATIENT
Start: 2024-09-26

## 2024-09-26 RX ORDER — LORAZEPAM 1 MG/1
1 TABLET ORAL
COMMUNITY

## 2024-09-26 RX ORDER — AMLODIPINE BESYLATE 10 MG/1
10 TABLET ORAL DAILY
Qty: 90 TABLET | Refills: 1 | Status: SHIPPED | OUTPATIENT
Start: 2024-09-26

## 2024-09-26 RX ORDER — HYDROCHLOROTHIAZIDE 50 MG/1
50 TABLET ORAL DAILY
Qty: 90 TABLET | Refills: 1 | Status: SHIPPED | OUTPATIENT
Start: 2024-09-26

## 2024-09-26 RX ORDER — PRAVASTATIN SODIUM 20 MG
20 TABLET ORAL
Qty: 90 TABLET | Refills: 1 | Status: SHIPPED | OUTPATIENT
Start: 2024-09-26

## 2024-09-26 RX ORDER — LOSARTAN POTASSIUM 25 MG/1
50 TABLET ORAL 2 TIMES DAILY
Qty: 360 TABLET | Refills: 1 | Status: SHIPPED | OUTPATIENT
Start: 2024-09-26

## 2024-09-26 RX ORDER — CARVEDILOL 12.5 MG/1
12.5 TABLET ORAL 2 TIMES DAILY WITH MEALS
Qty: 180 TABLET | Refills: 1 | Status: SHIPPED | OUTPATIENT
Start: 2024-09-26

## 2024-10-01 DIAGNOSIS — E11.9 TYPE 2 DIABETES MELLITUS WITHOUT COMPLICATION, WITHOUT LONG-TERM CURRENT USE OF INSULIN: ICD-10-CM

## 2024-10-01 LAB — INR PPP: 2.7 (ref 0.9–1.1)

## 2024-10-02 RX ORDER — LIRAGLUTIDE 6 MG/ML
INJECTION SUBCUTANEOUS
Qty: 6 ML | Refills: 2 | Status: SHIPPED | OUTPATIENT
Start: 2024-10-02

## 2024-10-08 ENCOUNTER — TELEPHONE (OUTPATIENT)
Dept: GASTROENTEROLOGY | Facility: CLINIC | Age: 59
End: 2024-10-08
Payer: COMMERCIAL

## 2024-10-08 ENCOUNTER — CLINICAL SUPPORT (OUTPATIENT)
Dept: GASTROENTEROLOGY | Facility: CLINIC | Age: 59
End: 2024-10-08
Payer: COMMERCIAL

## 2024-10-08 ENCOUNTER — PREP FOR SURGERY (OUTPATIENT)
Dept: OTHER | Facility: HOSPITAL | Age: 59
End: 2024-10-08
Payer: COMMERCIAL

## 2024-10-08 DIAGNOSIS — Z80.0 FAMILY HISTORY OF COLON CANCER: ICD-10-CM

## 2024-10-08 DIAGNOSIS — Z12.11 ENCOUNTER FOR SCREENING FOR MALIGNANT NEOPLASM OF COLON: Primary | ICD-10-CM

## 2024-10-08 RX ORDER — SODIUM, POTASSIUM,MAG SULFATES 17.5-3.13G
2 SOLUTION, RECONSTITUTED, ORAL ORAL EVERY 12 HOURS
Qty: 354 ML | Refills: 0 | Status: SHIPPED | OUTPATIENT
Start: 2024-10-08

## 2024-10-08 NOTE — PROGRESS NOTES
Freedom Stevens  1965  59 y.o.    Reason for call: 10 year recall - previous colon 2014- FM Hx colon cancer  Prep prescribed: Suprep  Prep instructions reviewed with patient and sent to patient via regular mail to the home address on file  Is the patient currently on any injectable or oral medications for weight loss or diabetes? No  Clearance needed? Yes  If yes, what clearance is needed? Blood thinner  Clearance has been requested from Dr. Barber  The patient has been scheduled for: Colonoscopy  After your procedure, you will be contacted with results. Please confirm the best phone # to reach the patient: [phone]  Family history of colon cancer? Yes  If yes, indicate relative: Brother and Sister  Tentative Procedure Date: 12/16/24    Family History   Problem Relation Age of Onset    Colon cancer Sister         40s    Prostate cancer Brother         40s    Colon cancer Brother         40s    Colon cancer Other      Past Medical History:   Diagnosis Date    Chest pain     Colon polyp     Heart disease     High blood pressure     Hx of blood diseases     Kidney stone     Polycystic kidney disease     Sleep apnea      Allergies   Allergen Reactions    Shellfish-Derived Products Unknown - Low Severity     Past Surgical History:   Procedure Laterality Date    AORTIC VALVE REPAIR/REPLACEMENT      CARDIAC SURGERY      CHOLECYSTECTOMY      COLONOSCOPY      GALLBLADDER SURGERY       Social History     Socioeconomic History    Marital status:    Tobacco Use    Smoking status: Never     Passive exposure: Past    Smokeless tobacco: Never   Vaping Use    Vaping status: Never Used   Substance and Sexual Activity    Alcohol use: Not Currently    Drug use: Never    Sexual activity: Defer       Current Outpatient Medications:     amLODIPine (NORVASC) 10 MG tablet, Take 1 tablet by mouth Daily., Disp: 90 tablet, Rfl: 1    Blood Glucose Monitoring Suppl (Accu-Chek Guide Me) w/Device kit, USE 1 UNIT TO CHECK GLUCOSE  ONCE DAILY, Disp: , Rfl:     carvedilol (COREG) 12.5 MG tablet, Take 1 tablet by mouth 2 (Two) Times a Day With Meals., Disp: 180 tablet, Rfl: 1    famotidine (PEPCID) 40 MG tablet, Take 1 tablet by mouth Every Night., Disp: 30 tablet, Rfl: 5    Glucose Blood (Blood Glucose Test Strips 333) strip, Use 1 each Daily. DX E11.9, Disp: 300 strip, Rfl: 1    glucose monitor monitoring kit, Use 1 each Daily. DX E11.9, Disp: 1 each, Rfl: 0    hydroCHLOROthiazide 50 MG tablet, Take 1 tablet by mouth Daily., Disp: 90 tablet, Rfl: 1    Insulin Pen Needle (Pen Needles) 32G X 4 MM misc, Use 1 each Daily. Daily with Victoza, Disp: 100 each, Rfl: 3    Lancets 28G misc, Use 1 each Daily. DX E11.9, Disp: 300 each, Rfl: 1    Liraglutide (VICTOZA) 18 MG/3ML solution pen-injector injection, INJECT 1.2MG UNDER THE SKIN INTO THE APPROPRIATE AREA AS DIRECTED, Disp: 6 mL, Rfl: 2    lithium (ESKALITH) 450 MG CR tablet, Take 1 tablet by mouth 2 (Two) Times a Day., Disp: , Rfl:     LORazepam (ATIVAN) 1 MG tablet, Take 1 tablet by mouth., Disp: , Rfl:     losartan (COZAAR) 25 MG tablet, Take 2 tablets by mouth 2 (Two) Times a Day., Disp: 360 tablet, Rfl: 1    metFORMIN (GLUCOPHAGE) 500 MG tablet, Take 1 tablet by mouth once daily, Disp: 90 tablet, Rfl: 0    pramipexole (MIRAPEX) 0.5 MG tablet, Take 1 tablet by mouth 2 (Two) Times a Day., Disp: , Rfl:     pravastatin (PRAVACHOL) 20 MG tablet, Take 1 tablet by mouth every night at bedtime., Disp: 90 tablet, Rfl: 1    risperiDONE (risperDAL) 2 MG tablet, Take 1 tablet by mouth Daily., Disp: , Rfl:     tamsulosin (FLOMAX) 0.4 MG capsule 24 hr capsule, Take 1 capsule by mouth Daily for 360 days., Disp: 90 capsule, Rfl: 3    temazepam (RESTORIL) 30 MG capsule, Take 1 capsule by mouth At Night As Needed., Disp: , Rfl:     warfarin (COUMADIN) 1 MG tablet, Take 10mg X 4 days a week and 6mg the other 3 days of the week, Disp: 90 tablet, Rfl: 0    warfarin (COUMADIN) 5 MG tablet, TAKE 10MG BY MOUTH 4 DAYS  A WEEK AND 6MG THREE DAYS A WEEK, Disp: 132 tablet, Rfl: 1

## 2024-10-08 NOTE — TELEPHONE ENCOUNTER
Sent to Dr Alberts on call for Sunil.  She is out until 10/15/24 and his is schedule for surgery on 10/16/24.

## 2024-10-08 NOTE — TELEPHONE ENCOUNTER
The procedure is scheduled for 12/16/2024  Dr. Barber will be back before then  This can wait for her return.    mas

## 2024-10-08 NOTE — TELEPHONE ENCOUNTER
10/8/2024    Dear Dr. Barebr,     Patient: Freedom Stevens   YOB: 1965        This patient is waiting to have a Colonoscopy and/or Esophagogastroduodenoscopy which I will perform at [facility] on 12/16/24.     Our records indicate this patient is currently taking Coumadin. This procedure requires the patient to suspend their anticoagulant medication prior to surgery.     Please respond to this request noting your recommendations. You may contact our office at [phone] Option 1 with any questions. I appreciate your prompt response in this matter.     Please return this form to our office no later than two weeks prior to the procedure date listed above. Please return form to [phone]. Please inform our office if the patient requires additional follow-up from your office prior to scheduled procedure date.     ____ I approve my patient to stop taking their Anticoagulant Therapy medication 3 days prior to the scheduled procedure.    ____ I do NOT approve my patient to stop taking their Anticoagulant Therapy medication at this time.      Please specify clearance expiration date:_____________________________    Approving physician name (please print):     _____________________________________________    Approving physician signature:     ________________________________    Date:________________        Sincerely,  [facility]   Gastroenterology - Dr.Kevin Reed

## 2024-10-18 ENCOUNTER — OFFICE VISIT (OUTPATIENT)
Dept: UROLOGY | Facility: CLINIC | Age: 59
End: 2024-10-18
Payer: COMMERCIAL

## 2024-10-18 VITALS
BODY MASS INDEX: 38.92 KG/M2 | WEIGHT: 278 LBS | DIASTOLIC BLOOD PRESSURE: 84 MMHG | HEIGHT: 71 IN | SYSTOLIC BLOOD PRESSURE: 146 MMHG

## 2024-10-18 DIAGNOSIS — Q61.9 CONGENITAL CYSTIC KIDNEY DISEASE: Primary | ICD-10-CM

## 2024-10-18 DIAGNOSIS — N40.1 BPH WITH OBSTRUCTION/LOWER URINARY TRACT SYMPTOMS: ICD-10-CM

## 2024-10-18 DIAGNOSIS — N13.8 BPH WITH OBSTRUCTION/LOWER URINARY TRACT SYMPTOMS: ICD-10-CM

## 2024-10-18 LAB
BILIRUB BLD-MCNC: NEGATIVE MG/DL
CLARITY, POC: CLEAR
COLOR UR: YELLOW
EXPIRATION DATE: NORMAL
GLUCOSE UR STRIP-MCNC: NEGATIVE MG/DL
KETONES UR QL: NEGATIVE
LEUKOCYTE EST, POC: NEGATIVE
Lab: NORMAL
NITRITE UR-MCNC: NEGATIVE MG/ML
PH UR: 6 [PH] (ref 5–8)
PROT UR STRIP-MCNC: NEGATIVE MG/DL
RBC # UR STRIP: NEGATIVE /UL
SP GR UR: 1.02 (ref 1–1.03)
UROBILINOGEN UR QL: NORMAL

## 2024-10-18 RX ORDER — TAMSULOSIN HYDROCHLORIDE 0.4 MG/1
1 CAPSULE ORAL DAILY
Qty: 90 CAPSULE | Refills: 3 | Status: SHIPPED | OUTPATIENT
Start: 2024-10-18 | End: 2025-10-13

## 2024-10-18 NOTE — PROGRESS NOTES
"Chief Complaint  Congenital cystic kidney disease    Subjective          Freedom Stevens presents to [facility]    History of Present Illness  Patient is here for follow-up of polycystic kidney disease.  His creatinine has been normal and is checked every 6 months.     He recently had a repeat CT scan to follow-up on his polycystic kidney disease.  CT scan findings are below.  Recent CT scan reveals no evidence of enhancing renal masses.      He reports he sees a nephrologist.     He only complains today of some post-void dribbling.  He states his stream is good and continues on his Flomax.      Objective   Vital Signs:   /84   Ht 180.3 cm (70.98\")   Wt 126 kg (278 lb)   BMI 38.79 kg/m²       Physical Exam  Vitals and nursing note reviewed.   Constitutional:       Appearance: Normal appearance. He is well-developed.   Pulmonary:      Effort: Pulmonary effort is normal.      Breath sounds: Normal air entry.   Neurological:      Mental Status: He is alert and oriented to person, place, and time.      Motor: Motor function is intact.   Psychiatric:         Mood and Affect: Mood normal.         Behavior: Behavior normal.          Result Review :   The following data was reviewed by: Ella Agrawal MD on 10/18/2024:    Results for orders placed or performed in visit on 10/18/24   POC Urinalysis Dipstick, Automated    Collection Time: 10/18/24  9:53 AM    Specimen: Urine   Result Value Ref Range    Color Yellow Yellow, Straw, Dark Yellow, Holli    Clarity, UA Clear Clear    Specific Gravity  1.020 1.005 - 1.030    pH, Urine 6.0 5.0 - 8.0    Leukocytes Negative Negative    Nitrite, UA Negative Negative    Protein, POC Negative Negative mg/dL    Glucose, UA Negative Negative mg/dL    Ketones, UA Negative Negative    Urobilinogen, UA 0.2 E.U./dL Normal, 0.2 E.U./dL    Bilirubin Negative Negative    Blood, UA Negative Negative    Lot Number [id number]     Expiration Date 82,025        PSA  "         12/14/2023    14:47   PSA   PSA 0.966        Results    Procedure Component Value Ref Range Date/Time   CT Abdomen Pelvis With & Without Contrast [[id number]] Collected: 08/27/24 1355   Order Status: Completed Updated: 08/27/24 1412   Narrative:     CT ABDOMEN PELVIS W WO CONTRAST    Date of Exam: 8/27/2024 9:40 AM EDT    Indication: Polycystic Kidney Disease.    Comparison: CT abdomen pelvis 6/26/2023    Technique: Axial CT images were obtained of the abdomen and pelvis before and after the uneventful intravenous administration of iodinated contrast. Sagittal and coronal reconstructions were performed.  Automated exposure control and iterative  reconstruction methods were used.      Findings:  Aortic valve prosthesis. Heart size within normal limits. No pericardial effusion. Lower lungs grossly clear.    Diffuse hepatic steatosis, moderate to severe in severity. No discrete liver lesion. Portal vasculature, splenic vein and superior mesenteric vein patent. Cholecystectomy. No dilation of biliary tree. No active pancreatitis or drainable collection.  Spleen is unremarkable. No suspicious adrenal nodule.    Right kidney measures 74 x 73 x 149 mm (length measured on sagittal) for calculated volume of 421.4 mL. Left kidney measures 83 x 67 x 148 mm (length measured on sagittal) for a calculated volume of 430.9 mL. Renal size appears without significant change   from prior comparison. 5 mm nonobstructing left upper pole calculus and 5 mm left inferior pole calculus. There are numerous predominantly simple appearing renal cystic lesions, largest exophytic off the left mid to upper renal pole measuring 5.2 cm.  Many of the cystic lesions are subcentimeter and too small to accurately characterize by CT. Cystic lesion in the left renal midpole shows layering calcification measuring 1.4 cm image 71 series 301 consistent with Bosniak 2 cyst. There are other small  intrinsically hyperdense renal lesions without  convincing postcontrast enhancement favoring hemorrhagic/proteinaceous renal cysts and are also without significant change from prior comparison. No convincing solid renal neoplasm. No hydronephrosis.  Symmetric renal excretion of contrast without suspicious filling defect in the collecting system. Urinary bladder unremarkable. Prostate within normal limits in size.    Expected configuration stomach and duodenum. Moderate formed stool. No evidence of bowel obstruction or active inflammation. Aortic atherosclerotic disease without aneurysm. No suspicious adenopathy. No ascites, free air or drainable collection. Minimal  anterior abdominal wall scarring without acute findings by CT. Degenerative changes in the lumbar spine without acute displaced fracture or aggressive lesion. Stable densely sclerotic S1 lesion favoring bone island/exostosis. Benign venous vascular  malformation/hemangioma within T12.   Impression:     Impression:  1. Polycystic renal disease containing multiple simple and probable mildly complex hemorrhagic/proteinaceous cysts without significant change from prior. Renal measurements and volume as detailed above.  2. Nonobstructing left renal calculi. No hydronephrosis.  3. Hepatic steatosis.  4. Other chronic/ancillary findings detailed above.        Electronically Signed: Harjinder Tavares MD   8/27/2024 2:10 PM EDT   Workstation ID: [id number]            Assessment and Plan    Diagnoses and all orders for this visit:    1. Congenital cystic kidney disease (Primary)  Overview:  Stable.  Continue to monitor.      Orders:  -     POC Urinalysis Dipstick, Automated  -     CT Abdomen Pelvis With & Without Contrast; Future    2. BPH with obstruction/lower urinary tract symptoms  -     tamsulosin (FLOMAX) 0.4 MG capsule 24 hr capsule; Take 1 capsule by mouth Daily for 360 days.  Dispense: 90 capsule; Refill: 3    I refilled his Flomax.  He is not sure whether is been taking or not but is having some postvoid  dribbling.  He will continue that.  I will see him back in 1 year with a CT scan prior for his polycystic kidney disease.          Follow Up       No follow-ups on file.  Patient was given instructions and counseling regarding his condition or for health maintenance advice. Please see specific information pulled into the AVS if appropriate.

## 2024-10-23 ENCOUNTER — ANTICOAGULATION VISIT (OUTPATIENT)
Dept: FAMILY MEDICINE CLINIC | Age: 59
End: 2024-10-23
Payer: COMMERCIAL

## 2024-10-23 VITALS — DIASTOLIC BLOOD PRESSURE: 78 MMHG | HEART RATE: 93 BPM | SYSTOLIC BLOOD PRESSURE: 129 MMHG

## 2024-10-23 DIAGNOSIS — Z95.2 HX OF AORTIC VALVE REPLACEMENT, MECHANICAL: Primary | ICD-10-CM

## 2024-10-23 LAB — INR PPP: 2.5 (ref 2.5–3.5)

## 2024-10-23 PROCEDURE — 36416 COLLJ CAPILLARY BLOOD SPEC: CPT | Performed by: FAMILY MEDICINE

## 2024-10-23 PROCEDURE — 93793 ANTICOAG MGMT PT WARFARIN: CPT | Performed by: FAMILY MEDICINE

## 2024-10-23 PROCEDURE — 85610 PROTHROMBIN TIME: CPT | Performed by: FAMILY MEDICINE

## 2024-10-28 ENCOUNTER — TELEPHONE (OUTPATIENT)
Dept: FAMILY MEDICINE CLINIC | Age: 59
End: 2024-10-28
Payer: COMMERCIAL

## 2024-10-28 DIAGNOSIS — E83.52 HYPERCALCEMIA: Primary | ICD-10-CM

## 2024-10-28 NOTE — TELEPHONE ENCOUNTER
----- Message from Isabella Barber sent at 9/27/2024  1:40 PM EDT -----  Please call to have pt come in for labs (BMP, dx hypercalcemia).  Please pend order to me.  Thanks, ASHLIE

## 2024-10-31 ENCOUNTER — LAB (OUTPATIENT)
Dept: LAB | Facility: HOSPITAL | Age: 59
End: 2024-10-31
Payer: COMMERCIAL

## 2024-10-31 DIAGNOSIS — E83.52 HYPERCALCEMIA: ICD-10-CM

## 2024-10-31 LAB
ANION GAP SERPL CALCULATED.3IONS-SCNC: 7 MMOL/L (ref 5–15)
BUN SERPL-MCNC: 14 MG/DL (ref 8–23)
BUN/CREAT SERPL: 14.6 (ref 7–25)
CALCIUM SPEC-SCNC: 9.4 MG/DL (ref 8.6–10.5)
CHLORIDE SERPL-SCNC: 105 MMOL/L (ref 98–107)
CO2 SERPL-SCNC: 26 MMOL/L (ref 22–29)
CREAT SERPL-MCNC: 0.96 MG/DL (ref 0.76–1.27)
EGFRCR SERPLBLD CKD-EPI 2021: 90.5 ML/MIN/1.73
GLUCOSE SERPL-MCNC: 155 MG/DL (ref 65–99)
POTASSIUM SERPL-SCNC: 4.3 MMOL/L (ref 3.5–5.2)
SODIUM SERPL-SCNC: 138 MMOL/L (ref 136–145)

## 2024-10-31 PROCEDURE — 36415 COLL VENOUS BLD VENIPUNCTURE: CPT

## 2024-10-31 PROCEDURE — 80048 BASIC METABOLIC PNL TOTAL CA: CPT

## 2024-11-14 DIAGNOSIS — I10 ESSENTIAL HYPERTENSION: ICD-10-CM

## 2024-11-14 RX ORDER — HYDROCHLOROTHIAZIDE 50 MG/1
50 TABLET ORAL DAILY
Qty: 90 TABLET | Refills: 0 | OUTPATIENT
Start: 2024-11-14

## 2024-11-18 DIAGNOSIS — E11.9 TYPE 2 DIABETES MELLITUS WITHOUT COMPLICATION, WITHOUT LONG-TERM CURRENT USE OF INSULIN: ICD-10-CM

## 2024-11-18 RX ORDER — WARFARIN SODIUM 1 MG/1
TABLET ORAL
Qty: 90 TABLET | Refills: 0 | Status: SHIPPED | OUTPATIENT
Start: 2024-11-18

## 2024-11-22 ENCOUNTER — ANTICOAGULATION VISIT (OUTPATIENT)
Dept: FAMILY MEDICINE CLINIC | Age: 59
End: 2024-11-22
Payer: COMMERCIAL

## 2024-11-22 VITALS — SYSTOLIC BLOOD PRESSURE: 123 MMHG | DIASTOLIC BLOOD PRESSURE: 73 MMHG | HEART RATE: 69 BPM

## 2024-11-22 DIAGNOSIS — Z95.2 HX OF AORTIC VALVE REPLACEMENT, MECHANICAL: Primary | ICD-10-CM

## 2024-11-22 LAB — INR PPP: 2.5 (ref 2.5–3.5)

## 2024-11-22 PROCEDURE — 85610 PROTHROMBIN TIME: CPT | Performed by: FAMILY MEDICINE

## 2024-11-22 PROCEDURE — 36416 COLLJ CAPILLARY BLOOD SPEC: CPT | Performed by: FAMILY MEDICINE

## 2024-11-22 PROCEDURE — 93793 ANTICOAG MGMT PT WARFARIN: CPT | Performed by: FAMILY MEDICINE

## 2024-11-27 DIAGNOSIS — E11.9 TYPE 2 DIABETES MELLITUS WITHOUT COMPLICATION, WITHOUT LONG-TERM CURRENT USE OF INSULIN: ICD-10-CM

## 2024-12-06 ENCOUNTER — TELEPHONE (OUTPATIENT)
Dept: FAMILY MEDICINE CLINIC | Age: 59
End: 2024-12-06
Payer: COMMERCIAL

## 2024-12-06 NOTE — TELEPHONE ENCOUNTER
"l     Caller: Freedom Stevens \"Toro\"    Relationship to patient: Self    Best call back number: [phone]    Patient is needing: PATIENT CALLED IN AND IS REQUESTING A CALL BACK TO SEE IF HE HAS A TELEPHONE VISIT WITH DR. PLASENCIA (NOT ON SCHEDULE) AND WOULD LIKE TO KNOW THAT DATE OF NEXT INR. PATIENT IS REQUESTING A CALL BACK PLEASE.        "

## 2024-12-10 NOTE — PRE-PROCEDURE INSTRUCTIONS
"Instructed on date and arrival time of 0830. Instructed that arrival time is not their procedure time but allows time to prepare for procedure.  Come to entrance \"C\". Must have  over age 18 to drive home.  May have two visitors; however, children under 12 must stay in waiting room.  Discussed clear liquid diet (no red or purple), bowel prep, and NPO.  May take medications as usual except for blood thinners, diabetic medications, and weight loss medications.  Verbalized understanding of instructions given.  Instructed to call for questions or concerns.  Hold Victoza for one week prior to procedure.  Hold Coumadin for 3 days prior to procedure.  Clearance noted in chart.  "

## 2024-12-13 ENCOUNTER — TELEPHONE (OUTPATIENT)
Dept: GASTROENTEROLOGY | Facility: CLINIC | Age: 59
End: 2024-12-13
Payer: COMMERCIAL

## 2024-12-13 ENCOUNTER — ANESTHESIA EVENT (OUTPATIENT)
Dept: GASTROENTEROLOGY | Facility: HOSPITAL | Age: 59
End: 2024-12-13
Payer: COMMERCIAL

## 2024-12-13 RX ORDER — SODIUM CHLORIDE, SODIUM LACTATE, POTASSIUM CHLORIDE, CALCIUM CHLORIDE 600; 310; 30; 20 MG/100ML; MG/100ML; MG/100ML; MG/100ML
30 INJECTION, SOLUTION INTRAVENOUS CONTINUOUS
Status: CANCELLED | OUTPATIENT
Start: 2024-12-16 | End: 2024-12-16

## 2024-12-13 NOTE — ANESTHESIA PREPROCEDURE EVALUATION
Anesthesia Evaluation     Patient summary reviewed and Nursing notes reviewed   NPO Solid Status: > 8 hours  NPO Liquid Status: > 4 hours           Airway   Mallampati: III  TM distance: <3 FB  Neck ROM: full  Large neck circumference and Possible difficult intubation  Dental - normal exam         Pulmonary     breath sounds clear to auscultation  (+) ,sleep apnea on CPAP  Cardiovascular - normal exam  Exercise tolerance: good (4-7 METS)    ECG reviewed  PT is on anticoagulation therapy  Patient on routine beta blocker  Rhythm: regular  Rate: normal    (+) hypertension well controlled 2 medications or greater, valvular problems/murmurs (aortic valve replaced), hyperlipidemia    ROS comment: History of Aortic Valve Replacement    Ascending aortic aneurysm - stable fusiform dilatation measuring 4.7cm. Repeat non contrast CT in one year    Neuro/Psych  (+) psychiatric history Bipolar and Anxiety  GI/Hepatic/Renal/Endo    (+) obesity, morbid obesity, renal disease (polycystic kidney disease)- stones, diabetes mellitus type 2    Musculoskeletal     Abdominal   (+) obese    Abdomen: soft.   Substance History      OB/GYN          Other        ROS/Med Hx Other: Victoza- last dose Dec 4th, 2024    Cardiac Clearance per Dr. Tellez on 01/28/25   Note from primary care physician about stopping coumadin and bridging with lovenox    ABNORMAL ECG -  Sinus rhythm  Abnormal T, consider ischemia, lateral leads  When compared with ECG of 02-May-2017 14:25:26,  New or worsened ischemia or infarction  Significant axis, voltage or hypertrophy change  Electronically Signed By: Nawaf Márquez ([facility]) 30-Jan-2023 06:02:57  Date and Time of Study: 2023-01-20 17:33:30      Last dose coumadin 02/26, bridged with lovenox SQ last dose on 03/02   PT INR results:     03/03/25 0804  Protime-INR  Collected: 03/03/25 0720  Final result  Specimen: Blood     Protime 15.3 High  Seconds INR 1.16 High                       Anesthesia Plan    ASA 3      general   total IV anesthesia  (Patient understands anesthesia not responsible for dental damage. Risks explained including allergic reactions, BP, HR, O2 changes, aspiration, advanced airway placement. Pt verbalized understanding.)  intravenous induction     Anesthetic plan, risks, benefits, and alternatives have been provided, discussed and informed consent has been obtained with: patient.  Pre-procedure education provided  Plan discussed with CRNA.      CODE STATUS:

## 2024-12-13 NOTE — TELEPHONE ENCOUNTER
Endo called stating Anesthesia is requesting Cardiology clearance.  We have the coumadin clearance from PCP.      Pt has NOT see cardio (Geoff) since 11/2022.      I have pt  (DAVIS'scope) rescheduled to 3/3/2025, 7 am arrival time.  Pt provided with Cardio phone number to call to make appt.  Pt voiced understanding. joel

## 2024-12-16 ENCOUNTER — ANESTHESIA (OUTPATIENT)
Dept: GASTROENTEROLOGY | Facility: HOSPITAL | Age: 59
End: 2024-12-16
Payer: COMMERCIAL

## 2024-12-20 ENCOUNTER — ANTICOAGULATION VISIT (OUTPATIENT)
Dept: FAMILY MEDICINE CLINIC | Age: 59
End: 2024-12-20
Payer: COMMERCIAL

## 2024-12-20 VITALS — HEART RATE: 66 BPM | SYSTOLIC BLOOD PRESSURE: 130 MMHG | DIASTOLIC BLOOD PRESSURE: 77 MMHG

## 2024-12-20 DIAGNOSIS — Z95.2 HX OF AORTIC VALVE REPLACEMENT, MECHANICAL: Primary | ICD-10-CM

## 2024-12-20 LAB — INR PPP: 2.7 (ref 2.5–3.5)

## 2024-12-20 PROCEDURE — 85610 PROTHROMBIN TIME: CPT | Performed by: FAMILY MEDICINE

## 2024-12-20 PROCEDURE — 36416 COLLJ CAPILLARY BLOOD SPEC: CPT | Performed by: FAMILY MEDICINE

## 2024-12-20 PROCEDURE — 93793 ANTICOAG MGMT PT WARFARIN: CPT | Performed by: FAMILY MEDICINE

## 2024-12-26 DIAGNOSIS — E11.9 TYPE 2 DIABETES MELLITUS WITHOUT COMPLICATION, WITHOUT LONG-TERM CURRENT USE OF INSULIN: ICD-10-CM

## 2024-12-26 RX ORDER — LIRAGLUTIDE 6 MG/ML
1.2 INJECTION SUBCUTANEOUS DAILY
Qty: 6 ML | Refills: 2 | Status: SHIPPED | OUTPATIENT
Start: 2024-12-26

## 2025-01-16 ENCOUNTER — LAB (OUTPATIENT)
Dept: LAB | Facility: HOSPITAL | Age: 60
End: 2025-01-16
Payer: COMMERCIAL

## 2025-01-16 ENCOUNTER — OFFICE VISIT (OUTPATIENT)
Dept: FAMILY MEDICINE CLINIC | Age: 60
End: 2025-01-16
Payer: COMMERCIAL

## 2025-01-16 VITALS
TEMPERATURE: 98.1 F | HEIGHT: 71 IN | DIASTOLIC BLOOD PRESSURE: 63 MMHG | SYSTOLIC BLOOD PRESSURE: 108 MMHG | WEIGHT: 271.2 LBS | BODY MASS INDEX: 37.97 KG/M2 | OXYGEN SATURATION: 99 % | HEART RATE: 74 BPM

## 2025-01-16 DIAGNOSIS — G47.33 OBSTRUCTIVE SLEEP APNEA SYNDROME: ICD-10-CM

## 2025-01-16 DIAGNOSIS — I10 ESSENTIAL HYPERTENSION: ICD-10-CM

## 2025-01-16 DIAGNOSIS — Z95.2 HX OF AORTIC VALVE REPLACEMENT, MECHANICAL: ICD-10-CM

## 2025-01-16 DIAGNOSIS — E11.9 TYPE 2 DIABETES MELLITUS WITHOUT COMPLICATION, WITHOUT LONG-TERM CURRENT USE OF INSULIN: ICD-10-CM

## 2025-01-16 DIAGNOSIS — F31.61 BIPOLAR DISORDER, CURRENT EPISODE MIXED, MILD: ICD-10-CM

## 2025-01-16 DIAGNOSIS — Z12.5 SCREENING PSA (PROSTATE SPECIFIC ANTIGEN): ICD-10-CM

## 2025-01-16 DIAGNOSIS — I71.21 ANEURYSM OF ASCENDING AORTA WITHOUT RUPTURE: ICD-10-CM

## 2025-01-16 DIAGNOSIS — E78.2 MIXED HYPERLIPIDEMIA: ICD-10-CM

## 2025-01-16 DIAGNOSIS — Z00.00 ANNUAL PHYSICAL EXAM: Primary | ICD-10-CM

## 2025-01-16 DIAGNOSIS — F41.1 GENERALIZED ANXIETY DISORDER: ICD-10-CM

## 2025-01-16 PROBLEM — R53.83 FATIGUE: Status: RESOLVED | Noted: 2021-12-07 | Resolved: 2025-01-16

## 2025-01-16 PROBLEM — Z12.11 ENCOUNTER FOR SCREENING FOR MALIGNANT NEOPLASM OF COLON: Status: RESOLVED | Noted: 2024-10-08 | Resolved: 2025-01-16

## 2025-01-16 LAB
HBA1C MFR BLD: 6.9 % (ref 4.8–5.6)
INR PPP: 3.6 (ref 2.5–3.5)
PSA SERPL-MCNC: 0.83 NG/ML (ref 0–4)

## 2025-01-16 PROCEDURE — 85610 PROTHROMBIN TIME: CPT | Performed by: FAMILY MEDICINE

## 2025-01-16 PROCEDURE — G0103 PSA SCREENING: HCPCS

## 2025-01-16 PROCEDURE — 36415 COLL VENOUS BLD VENIPUNCTURE: CPT

## 2025-01-16 PROCEDURE — 99396 PREV VISIT EST AGE 40-64: CPT | Performed by: FAMILY MEDICINE

## 2025-01-16 PROCEDURE — 83036 HEMOGLOBIN GLYCOSYLATED A1C: CPT

## 2025-01-16 PROCEDURE — 36416 COLLJ CAPILLARY BLOOD SPEC: CPT | Performed by: FAMILY MEDICINE

## 2025-01-16 RX ORDER — AMLODIPINE BESYLATE 10 MG/1
10 TABLET ORAL DAILY
Qty: 90 TABLET | Refills: 1 | Status: SHIPPED | OUTPATIENT
Start: 2025-01-16

## 2025-01-16 RX ORDER — LOSARTAN POTASSIUM 25 MG/1
50 TABLET ORAL 2 TIMES DAILY
Qty: 360 TABLET | Refills: 1 | Status: SHIPPED | OUTPATIENT
Start: 2025-01-16

## 2025-01-16 RX ORDER — WARFARIN SODIUM 5 MG/1
TABLET ORAL
Qty: 132 TABLET | Refills: 1 | Status: SHIPPED | OUTPATIENT
Start: 2025-01-16

## 2025-01-16 RX ORDER — PRAVASTATIN SODIUM 20 MG
20 TABLET ORAL
Qty: 90 TABLET | Refills: 1 | Status: SHIPPED | OUTPATIENT
Start: 2025-01-16

## 2025-01-16 RX ORDER — FAMOTIDINE 40 MG/1
40 TABLET, FILM COATED ORAL NIGHTLY
Qty: 90 TABLET | Refills: 1 | Status: SHIPPED | OUTPATIENT
Start: 2025-01-16

## 2025-01-16 RX ORDER — CARVEDILOL 12.5 MG/1
12.5 TABLET ORAL 2 TIMES DAILY WITH MEALS
Qty: 180 TABLET | Refills: 1 | Status: SHIPPED | OUTPATIENT
Start: 2025-01-16

## 2025-01-16 RX ORDER — HYDROCHLOROTHIAZIDE 50 MG/1
50 TABLET ORAL DAILY
Qty: 90 TABLET | Refills: 1 | Status: SHIPPED | OUTPATIENT
Start: 2025-01-16

## 2025-01-16 NOTE — ASSESSMENT & PLAN NOTE
He is following with Psychiatry Dr. Mccauley.  Stable on lithium, risperidone, temazepam, and pramipexole.

## 2025-01-16 NOTE — ASSESSMENT & PLAN NOTE
INR check today.  He is stable on warfarin.  We are monitoring and managing INR.  Orders:    POC INR    warfarin (COUMADIN) 5 MG tablet; TAKE 10MG BY MOUTH 4 DAYS A WEEK AND 6MG THREE DAYS A WEEK

## 2025-01-16 NOTE — ASSESSMENT & PLAN NOTE
Stable on pravastatin 20 mg daily.  Refills were needed today.  Labs were not due today.  Continue to monitor.  Orders:    pravastatin (PRAVACHOL) 20 MG tablet; Take 1 tablet by mouth every night at bedtime.

## 2025-01-16 NOTE — PROGRESS NOTES
"Chief Complaint  Annual Exam    Subjective          Freedom Stevens presents to [facility] today for his annual physical.      He is UTD on colonoscopy, last done 12/2014 and this was normal.  Ten year repeat recommended; scheduled 3/3/2025 with Dr. Reed.  He is due for prostate cancer screening.  He is UTD on Aeohwmp40 (10/2023), Shingrix (3/2020, 8/2020), Tdap (10/2018) and flu (9/2024).  He is due for COVID.  He is due for routine labs including A1c and PSA.    He is on metformin for diabetes.  He stopped taking the Victoza last week.  He noted some swings in weight while on it.  He decided he was going to do a \"3 day fast\" instead and then start eating vegetables when he got done with that.  Today is the last day.   Last A1c 6.4 in Sept.  He has been checking fasting blood glucose which has been running >200s.  He is on a statin and ARB.  He is UTD on foot exam (4/2024).  He is UTD on eye exam, last done 1/2025.     He is on amlodipine, carvedilol, HCTZ, and losartan for HTN.  BP has been well controlled.  Denies chest pain (no issues since last visit), palpitations or shortness of breath.  He follows with Dr. Tellez Cardiology.    He is on pravastatin for HLD.  Denies myalgias.      He is on warfarin for anticoagulation of  mechanical AV.  Follows with Dr. Tellez Cardiology but we are managing the INR.    He sees CT surgery once a year as well as Dr. Tellez for 4.7 cm fusiform ascending aortic aneurysm.  Status post ascending aortic aneurysm repair and aortic valve replacement.       He follows with Dr. Mccauley for bipolar d/o with depression and anxiety.  His mood has been \"okay.  He [Dr. Mccauley] thinks the meds are working fine.\"   He is on lithium, temazepam, risperidone, and pramipexole.        +DORIAN but is intolerant of CPAP.    Review of Systems   Constitutional:  Negative for chills, fatigue and fever.   HENT:  Negative for congestion, hearing " loss and rhinorrhea.    Eyes:  Negative for pain and visual disturbance.   Respiratory:  Negative for cough and shortness of breath.    Cardiovascular:  Negative for chest pain and palpitations.   Gastrointestinal:  Negative for abdominal pain, constipation, diarrhea, nausea and vomiting.   Genitourinary:  Negative for dysuria and hematuria.   Musculoskeletal:  Negative for arthralgias and myalgias.   Skin:  Negative for rash.   Neurological:  Negative for weakness and numbness.   Psychiatric/Behavioral:  Negative for dysphoric mood and sleep disturbance. The patient is not nervous/anxious.          Current Outpatient Medications:     amLODIPine (NORVASC) 10 MG tablet, Take 1 tablet by mouth Daily., Disp: 90 tablet, Rfl: 1    Blood Glucose Monitoring Suppl (Accu-Chek Guide Me) w/Device kit, USE 1 UNIT TO CHECK GLUCOSE ONCE DAILY, Disp: , Rfl:     carvedilol (COREG) 12.5 MG tablet, Take 1 tablet by mouth 2 (Two) Times a Day With Meals., Disp: 180 tablet, Rfl: 1    famotidine (PEPCID) 40 MG tablet, Take 1 tablet by mouth Every Night., Disp: 90 tablet, Rfl: 1    Glucose Blood (Blood Glucose Test Strips 333) strip, Use 1 each Daily. DX E11.9, Disp: 300 strip, Rfl: 1    glucose monitor monitoring kit, Use 1 each Daily. DX E11.9, Disp: 1 each, Rfl: 0    hydroCHLOROthiazide 50 MG tablet, Take 1 tablet by mouth Daily., Disp: 90 tablet, Rfl: 1    Insulin Pen Needle (Pen Needles) 32G X 4 MM misc, Use 1 each Daily. Daily with Victoza, Disp: 100 each, Rfl: 3    Lancets 28G misc, Use 1 each Daily. DX E11.9, Disp: 300 each, Rfl: 1    lithium (ESKALITH) 450 MG CR tablet, Take 1 tablet by mouth 2 (Two) Times a Day., Disp: , Rfl:     LORazepam (ATIVAN) 1 MG tablet, Take 1 tablet by mouth., Disp: , Rfl:     losartan (COZAAR) 25 MG tablet, Take 2 tablets by mouth 2 (Two) Times a Day., Disp: 360 tablet, Rfl: 1    metFORMIN (GLUCOPHAGE) 500 MG tablet, Take 1 tablet by mouth Daily., Disp: 90 tablet, Rfl: 1    pramipexole (MIRAPEX) 0.5 MG  "tablet, Take 1 tablet by mouth 2 (Two) Times a Day., Disp: , Rfl:     pravastatin (PRAVACHOL) 20 MG tablet, Take 1 tablet by mouth every night at bedtime., Disp: 90 tablet, Rfl: 1    risperiDONE (risperDAL) 2 MG tablet, Take 1 tablet by mouth Daily., Disp: , Rfl:     tamsulosin (FLOMAX) 0.4 MG capsule 24 hr capsule, Take 1 capsule by mouth Daily for 360 days., Disp: 90 capsule, Rfl: 3    temazepam (RESTORIL) 30 MG capsule, Take 1 capsule by mouth At Night As Needed., Disp: , Rfl:     warfarin (COUMADIN) 5 MG tablet, TAKE 10MG BY MOUTH 4 DAYS A WEEK AND 6MG THREE DAYS A WEEK, Disp: 132 tablet, Rfl: 1    sodium-potassium-magnesium sulfates (Suprep Bowel Prep Kit) 17.5-3.13-1.6 GM/177ML solution oral solution, Take 2 bottles by mouth Every 12 (Twelve) Hours. (Patient not taking: Reported on 1/16/2025), Disp: 354 mL, Rfl: 0    Allergies:  Shellfish-derived products      Objective   Vital Signs:   Vitals:    01/16/25 1119   BP: 108/63   Pulse: 74   Temp: 98.1 °F (36.7 °C)   TempSrc: Oral   SpO2: 99%  Comment: room air   Weight: 123 kg (271 lb 3.2 oz)   Height: 180.3 cm (70.98\")     Body mass index is 37.84 kg/m².         Physical Exam  Vitals reviewed.   Constitutional:       General: He is not in acute distress.     Appearance: Normal appearance. He is well-developed.   HENT:      Head: Normocephalic and atraumatic.      Right Ear: External ear normal.      Left Ear: External ear normal.      Mouth/Throat:      Mouth: Mucous membranes are moist.   Eyes:      Extraocular Movements: Extraocular movements intact.      Conjunctiva/sclera: Conjunctivae normal.      Pupils: Pupils are equal, round, and reactive to light.   Cardiovascular:      Rate and Rhythm: Normal rate and regular rhythm.      Heart sounds: No murmur heard.  Pulmonary:      Effort: Pulmonary effort is normal.      Breath sounds: Normal breath sounds. No wheezing, rhonchi or rales.   Abdominal:      General: Bowel sounds are normal. There is no distension. " "     Palpations: Abdomen is soft.      Tenderness: There is no abdominal tenderness.   Musculoskeletal:         General: Normal range of motion.   Skin:     General: Skin is warm and dry.   Neurological:      Mental Status: He is alert and oriented to person, place, and time.      Deep Tendon Reflexes: Reflexes normal.   Psychiatric:         Mood and Affect: Mood and affect normal.         Behavior: Behavior normal.         Thought Content: Thought content normal.         Judgment: Judgment normal.          Lab Results   Component Value Date    GLUCOSE 155 (H) 10/31/2024    BUN 14 10/31/2024    CREATININE 0.96 10/31/2024    EGFR 90.5 10/31/2024    BCR 14.6 10/31/2024    K 4.3 10/31/2024    CO2 26.0 10/31/2024    CALCIUM 9.4 10/31/2024    ALBUMIN 4.6 09/26/2024    BILITOT 0.5 09/26/2024    AST 45 (H) 09/26/2024    ALT 32 09/26/2024       Lab Results   Component Value Date    CHOL 103 09/26/2024    CHLPL 125 03/11/2021    TRIG 194 (H) 09/26/2024    HDL 30 (L) 09/26/2024    LDL 41 09/26/2024       Lab Results   Component Value Date    WBC 5.84 09/26/2024    HGB 13.2 09/26/2024    HCT 40.0 09/26/2024    MCV 91.7 09/26/2024     09/26/2024     Lab Results   Component Value Date    HGBA1C 6.40 (H) 09/26/2024             Assessment and Plan    Assessment & Plan  Annual physical exam  He is UTD on colonoscopy, last done 12/2014 and this was normal.  Ten year repeat recommended; scheduled 3/3/2025 with Dr. Reed.  He is due for prostate cancer screening.  He is UTD on Nfojges05 (10/2023), Shingrix (3/2020, 8/2020), Tdap (10/2018) and flu (9/2024).  He is due for COVID; declines today.  He is due for routine labs including A1c and PSA; ordered.       Type 2 diabetes mellitus without complication, without long-term current use of insulin    He is on metformin for diabetes.  He stopped taking the Victoza last week.  He noted some swings in weight while on it.  He decided he was going to do a \"3 day fast\" instead and " then start eating vegetables when he got done with that.  Today is the last day.   Last A1c 6.4 in Sept.  He has been checking fasting blood glucose which has been running >200s.  He is on a statin and ARB.  He is UTD on foot exam (4/2024).  He is UTD on eye exam, last done 1/2025.  Checking A1c today.  May need to restart a medication to take the place of the Victoza.  Orders:    Hemoglobin A1c; Future    metFORMIN (GLUCOPHAGE) 500 MG tablet; Take 1 tablet by mouth Daily.    Essential hypertension    Blood pressure has been running at goal.  Continue amlodipine 10 mg daily, carvedilol 12.5 mg twice daily, losartan 50 mg twice daily and HCTZ 50 mg daily.  Refills were needed today.  Labs were not needed today.  Continue to monitor.  Orders:    losartan (COZAAR) 25 MG tablet; Take 2 tablets by mouth 2 (Two) Times a Day.    hydroCHLOROthiazide 50 MG tablet; Take 1 tablet by mouth Daily.    carvedilol (COREG) 12.5 MG tablet; Take 1 tablet by mouth 2 (Two) Times a Day With Meals.    amLODIPine (NORVASC) 10 MG tablet; Take 1 tablet by mouth Daily.    Mixed hyperlipidemia     Stable on pravastatin 20 mg daily.  Refills were needed today.  Labs were not due today.  Continue to monitor.  Orders:    pravastatin (PRAVACHOL) 20 MG tablet; Take 1 tablet by mouth every night at bedtime.    Hx of aortic valve replacement, mechanical  INR check today.  He is stable on warfarin.  We are monitoring and managing INR.  Orders:    POC INR    warfarin (COUMADIN) 5 MG tablet; TAKE 10MG BY MOUTH 4 DAYS A WEEK AND 6MG THREE DAYS A WEEK    Aneurysm of ascending aorta without rupture  He was evaluated by CT Surgery who recommended yearly follow-up. History of ascending aortic aneurysm repair and aortic valve replacement.        Bipolar disorder, current episode mixed, mild    He is following with Psychiatry Dr. Mccauley.  Stable on lithium, risperidone, temazepam, and pramipexole.        Generalized anxiety disorder    As per bipolar  disorder plan.       Obstructive sleep apnea syndrome  Intolerant of CPAP.       Screening PSA (prostate specific antigen)    Orders:    PSA Screen; Future      Other orders    famotidine (PEPCID) 40 MG tablet; Take 1 tablet by mouth Every Night.       Follow Up   Return in about 4 months (around 5/16/2025) for Recheck.  Patient was given instructions and counseling regarding his condition or for health maintenance advice. Please see specific information pulled into the AVS if appropriate.         01/16/2025

## 2025-01-16 NOTE — ASSESSMENT & PLAN NOTE
He was evaluated by CT Surgery who recommended yearly follow-up. History of ascending aortic aneurysm repair and aortic valve replacement.

## 2025-01-16 NOTE — ASSESSMENT & PLAN NOTE
"  He is on metformin for diabetes.  He stopped taking the Victoza last week.  He noted some swings in weight while on it.  He decided he was going to do a \"3 day fast\" instead and then start eating vegetables when he got done with that.  Today is the last day.   Last A1c 6.4 in Sept.  He has been checking fasting blood glucose which has been running >200s.  He is on a statin and ARB.  He is UTD on foot exam (4/2024).  He is UTD on eye exam, last done 1/2025.  Checking A1c today.  May need to restart a medication to take the place of the Victoza.  Orders:    Hemoglobin A1c; Future    metFORMIN (GLUCOPHAGE) 500 MG tablet; Take 1 tablet by mouth Daily.    "

## 2025-01-16 NOTE — ASSESSMENT & PLAN NOTE
Blood pressure has been running at goal.  Continue amlodipine 10 mg daily, carvedilol 12.5 mg twice daily, losartan 50 mg twice daily and HCTZ 50 mg daily.  Refills were needed today.  Labs were not needed today.  Continue to monitor.  Orders:    losartan (COZAAR) 25 MG tablet; Take 2 tablets by mouth 2 (Two) Times a Day.    hydroCHLOROthiazide 50 MG tablet; Take 1 tablet by mouth Daily.    carvedilol (COREG) 12.5 MG tablet; Take 1 tablet by mouth 2 (Two) Times a Day With Meals.    amLODIPine (NORVASC) 10 MG tablet; Take 1 tablet by mouth Daily.

## 2025-01-27 ENCOUNTER — ANTICOAGULATION VISIT (OUTPATIENT)
Dept: FAMILY MEDICINE CLINIC | Age: 60
End: 2025-01-27
Payer: COMMERCIAL

## 2025-01-27 VITALS
SYSTOLIC BLOOD PRESSURE: 125 MMHG | DIASTOLIC BLOOD PRESSURE: 74 MMHG | HEART RATE: 57 BPM | BODY MASS INDEX: 37.76 KG/M2 | WEIGHT: 270.6 LBS

## 2025-01-27 DIAGNOSIS — Z95.2 HX OF AORTIC VALVE REPLACEMENT, MECHANICAL: Primary | ICD-10-CM

## 2025-01-27 LAB — INR PPP: 2.6 (ref 2.5–3.5)

## 2025-01-27 PROCEDURE — 85610 PROTHROMBIN TIME: CPT | Performed by: FAMILY MEDICINE

## 2025-01-27 PROCEDURE — 93793 ANTICOAG MGMT PT WARFARIN: CPT | Performed by: FAMILY MEDICINE

## 2025-01-27 PROCEDURE — 36416 COLLJ CAPILLARY BLOOD SPEC: CPT | Performed by: FAMILY MEDICINE

## 2025-01-27 RX ORDER — LANCETS
1 EACH MISCELLANEOUS DAILY
Qty: 100 EACH | Refills: 0 | Status: SHIPPED | OUTPATIENT
Start: 2025-01-27

## 2025-01-28 ENCOUNTER — TELEPHONE (OUTPATIENT)
Dept: FAMILY MEDICINE CLINIC | Age: 60
End: 2025-01-28
Payer: COMMERCIAL

## 2025-01-28 NOTE — TELEPHONE ENCOUNTER
"    Caller: Freedom Stevens \"Toro\"    Relationship: Self    Best call back number: [phone]     What is the medical concern/diagnosis: COLONOSCOPY    What is the provider, practice or medical service name: [facility]       Any additional details: PATIENT STATES HE NEEDS A COLONOSCOPY AND WAS NOT ABLE TO DO THIS UNTIL CLEARED FROM HIS CARDIOLOGIST. PATIENT SAW HIS CARDIOLOGIST TODAY 1.28.25 WHO SAID THIS IS OK TO SCHEDULE.    "

## 2025-01-29 NOTE — TELEPHONE ENCOUNTER
Spoke to Patient and informed him that he is already scheduled for surgery.  Phone number for Dr Reed's office given to Patient.

## 2025-02-17 ENCOUNTER — TELEPHONE (OUTPATIENT)
Dept: GASTROENTEROLOGY | Facility: CLINIC | Age: 60
End: 2025-02-17

## 2025-02-17 NOTE — TELEPHONE ENCOUNTER
Spoke with pt. Went over Suprep prep, will also mail prep instructions to pt. Voiced understanding. joel

## 2025-02-17 NOTE — TELEPHONE ENCOUNTER
"Provider: JON    Caller: Freedom Stevens \"Toro\"    Relationship to Patient: Self    Reason for Call: PATIENT NEEDS PREP INSTRUCTIONS FOR  HIS COLONOSCOPY THAT'S SCHEDULED FOR 3/3/25 WITH DR WEBB. PLEASE CALL PATIENT    "

## 2025-02-25 NOTE — PRE-PROCEDURE INSTRUCTIONS
"Instructed on date and arrival time of 0700. Instructed that arrival time is not their procedure time but allows time to prepare for procedure.  Come to entrance \"C\". Must have  over age 18 to drive home.  May have two visitors; however, children under 12 must stay in waiting room.  Discussed clear liquid diet (no red or purple), bowel prep, and NPO.  May take medications as usual except for blood thinners, diabetic medications, and weight loss medications.  Verbalized understanding of instructions given.  Instructed to call for questions or concerns.  Hold Victoza for one day prior to prior to procedure.  Hold Coumadin for 5 days prior to procedure.  Clearance noted in chart.  "

## 2025-02-27 ENCOUNTER — ANTICOAGULATION VISIT (OUTPATIENT)
Dept: FAMILY MEDICINE CLINIC | Age: 60
End: 2025-02-27
Payer: COMMERCIAL

## 2025-02-27 VITALS — HEART RATE: 55 BPM | SYSTOLIC BLOOD PRESSURE: 123 MMHG | DIASTOLIC BLOOD PRESSURE: 65 MMHG

## 2025-02-27 DIAGNOSIS — Z95.2 HX OF AORTIC VALVE REPLACEMENT, MECHANICAL: Primary | ICD-10-CM

## 2025-02-27 LAB — INR PPP: 2.6 (ref 2.5–3.5)

## 2025-02-27 PROCEDURE — 85610 PROTHROMBIN TIME: CPT | Performed by: FAMILY MEDICINE

## 2025-02-27 PROCEDURE — 36416 COLLJ CAPILLARY BLOOD SPEC: CPT | Performed by: FAMILY MEDICINE

## 2025-02-27 PROCEDURE — 93793 ANTICOAG MGMT PT WARFARIN: CPT | Performed by: FAMILY MEDICINE

## 2025-02-28 PROBLEM — Z12.11 ENCOUNTER FOR SCREENING FOR MALIGNANT NEOPLASM OF COLON: Status: ACTIVE | Noted: 2024-10-08

## 2025-03-03 ENCOUNTER — TELEPHONE (OUTPATIENT)
Dept: FAMILY MEDICINE CLINIC | Age: 60
End: 2025-03-03
Payer: COMMERCIAL

## 2025-03-03 ENCOUNTER — TELEPHONE (OUTPATIENT)
Dept: GASTROENTEROLOGY | Facility: CLINIC | Age: 60
End: 2025-03-03
Payer: COMMERCIAL

## 2025-03-03 ENCOUNTER — HOSPITAL ENCOUNTER (OUTPATIENT)
Facility: HOSPITAL | Age: 60
Setting detail: HOSPITAL OUTPATIENT SURGERY
Discharge: HOME | End: 2025-03-03
Attending: INTERNAL MEDICINE | Admitting: INTERNAL MEDICINE
Payer: COMMERCIAL

## 2025-03-03 VITALS
SYSTOLIC BLOOD PRESSURE: 113 MMHG | RESPIRATION RATE: 13 BRPM | DIASTOLIC BLOOD PRESSURE: 75 MMHG | TEMPERATURE: 97.8 F | OXYGEN SATURATION: 95 % | BODY MASS INDEX: 38.11 KG/M2 | WEIGHT: 273.15 LBS | HEART RATE: 70 BPM

## 2025-03-03 DIAGNOSIS — Z12.11 ENCOUNTER FOR SCREENING FOR MALIGNANT NEOPLASM OF COLON: ICD-10-CM

## 2025-03-03 DIAGNOSIS — Z80.0 FAMILY HISTORY OF COLON CANCER: ICD-10-CM

## 2025-03-03 LAB
GLUCOSE BLDC GLUCOMTR-MCNC: 151 MG/DL (ref 70–99)
INR PPP: 1.16 (ref 0.86–1.15)
PROTHROMBIN TIME: 15.3 SECONDS (ref 11.8–14.9)

## 2025-03-03 PROCEDURE — 25010000002 LIDOCAINE PF 2% 2 % SOLUTION: Performed by: NURSE ANESTHETIST, CERTIFIED REGISTERED

## 2025-03-03 PROCEDURE — 45385 COLONOSCOPY W/LESION REMOVAL: CPT | Performed by: INTERNAL MEDICINE

## 2025-03-03 PROCEDURE — 82948 REAGENT STRIP/BLOOD GLUCOSE: CPT

## 2025-03-03 PROCEDURE — 25010000002 GLYCOPYRROLATE 0.2 MG/ML SOLUTION: Performed by: NURSE ANESTHETIST, CERTIFIED REGISTERED

## 2025-03-03 PROCEDURE — 85610 PROTHROMBIN TIME: CPT | Performed by: INTERNAL MEDICINE

## 2025-03-03 PROCEDURE — 88305 TISSUE EXAM BY PATHOLOGIST: CPT | Performed by: INTERNAL MEDICINE

## 2025-03-03 PROCEDURE — 25010000002 PROPOFOL 10 MG/ML EMULSION: Performed by: NURSE ANESTHETIST, CERTIFIED REGISTERED

## 2025-03-03 PROCEDURE — 25810000003 LACTATED RINGERS PER 1000 ML

## 2025-03-03 RX ORDER — ENOXAPARIN SODIUM 100 MG/ML
1 INJECTION SUBCUTANEOUS EVERY 12 HOURS SCHEDULED
Qty: 7.2 ML | Refills: 0 | Status: SHIPPED | OUTPATIENT
Start: 2025-03-03 | End: 2025-03-06

## 2025-03-03 RX ORDER — GLYCOPYRROLATE 0.2 MG/ML
INJECTION INTRAMUSCULAR; INTRAVENOUS AS NEEDED
Status: DISCONTINUED | OUTPATIENT
Start: 2025-03-03 | End: 2025-03-03 | Stop reason: SURG

## 2025-03-03 RX ORDER — SODIUM CHLORIDE, SODIUM LACTATE, POTASSIUM CHLORIDE, CALCIUM CHLORIDE 600; 310; 30; 20 MG/100ML; MG/100ML; MG/100ML; MG/100ML
1000 INJECTION, SOLUTION INTRAVENOUS CONTINUOUS
Status: ACTIVE | OUTPATIENT
Start: 2025-03-03 | End: 2025-03-03

## 2025-03-03 RX ORDER — LIDOCAINE HYDROCHLORIDE 20 MG/ML
INJECTION, SOLUTION EPIDURAL; INFILTRATION; INTRACAUDAL; PERINEURAL AS NEEDED
Status: DISCONTINUED | OUTPATIENT
Start: 2025-03-03 | End: 2025-03-03 | Stop reason: SURG

## 2025-03-03 RX ORDER — PROPOFOL 10 MG/ML
VIAL (ML) INTRAVENOUS AS NEEDED
Status: DISCONTINUED | OUTPATIENT
Start: 2025-03-03 | End: 2025-03-03 | Stop reason: SURG

## 2025-03-03 RX ADMIN — PROPOFOL 100 MG: 10 INJECTION, EMULSION INTRAVENOUS at 08:57

## 2025-03-03 RX ADMIN — SODIUM CHLORIDE, SODIUM LACTATE, POTASSIUM CHLORIDE, CALCIUM CHLORIDE 1000 ML: 20; 30; 600; 310 INJECTION, SOLUTION INTRAVENOUS at 07:46

## 2025-03-03 RX ADMIN — GLYCOPYRROLATE 0.4 MG: 0.2 INJECTION INTRAMUSCULAR; INTRAVENOUS at 08:55

## 2025-03-03 RX ADMIN — PROPOFOL 175 MCG/KG/MIN: 10 INJECTION, EMULSION INTRAVENOUS at 08:57

## 2025-03-03 RX ADMIN — LIDOCAINE HYDROCHLORIDE 50 MG: 20 INJECTION, SOLUTION EPIDURAL; INFILTRATION; INTRACAUDAL; PERINEURAL at 08:57

## 2025-03-03 NOTE — H&P
ScreeningPre Procedure History & Physical    Chief Complaint:   Screening     Subjective     HPI:   Screening     Past Medical History:   Past Medical History:   Diagnosis Date    Chest pain     GERD (gastroesophageal reflux disease)     Heart disease     High blood pressure     Hx of blood diseases     Hyperlipidemia     Kidney stone     Polycystic kidney disease     Sleep apnea        Past Surgical History:  Past Surgical History:   Procedure Laterality Date    AORTIC VALVE REPAIR/REPLACEMENT      CARDIAC SURGERY      CHOLECYSTECTOMY      COLONOSCOPY      GALLBLADDER SURGERY         Family History:  Family History   Problem Relation Age of Onset    Colon cancer Sister         40s    Prostate cancer Brother         40s    Colon cancer Brother         40s    Colon cancer Other        Social History:   reports that he has never smoked. He has been exposed to tobacco smoke. He has never used smokeless tobacco. He reports that he does not currently use alcohol. He reports that he does not use drugs.    Medications:   Medications Prior to Admission   Medication Sig Dispense Refill Last Dose/Taking    amLODIPine (NORVASC) 10 MG tablet Take 1 tablet by mouth Daily. 90 tablet 1 3/2/2025    carvedilol (COREG) 12.5 MG tablet Take 1 tablet by mouth 2 (Two) Times a Day With Meals. 180 tablet 1 3/2/2025    famotidine (PEPCID) 40 MG tablet Take 1 tablet by mouth Every Night. 90 tablet 1 3/2/2025    Glucose Blood (Blood Glucose Test Strips 333) strip Use 1 each Daily. DX E11.9 300 strip 1 3/2/2025    hydroCHLOROthiazide 50 MG tablet Take 1 tablet by mouth Daily. 90 tablet 1 3/2/2025    lithium (ESKALITH) 450 MG CR tablet Take 1 tablet by mouth 2 (Two) Times a Day.   3/2/2025    LORazepam (ATIVAN) 1 MG tablet Take 1 tablet by mouth.   3/2/2025    losartan (COZAAR) 25 MG tablet Take 2 tablets by mouth 2 (Two) Times a Day. 360 tablet 1 3/2/2025    metFORMIN (GLUCOPHAGE) 500 MG tablet Take 1 tablet by mouth Daily. 90 tablet 1 Past  Week    pramipexole (MIRAPEX) 0.5 MG tablet Take 1 tablet by mouth 2 (Two) Times a Day.   3/2/2025    pravastatin (PRAVACHOL) 20 MG tablet Take 1 tablet by mouth every night at bedtime. 90 tablet 1 3/2/2025    risperiDONE (risperDAL) 2 MG tablet Take 1 tablet by mouth Daily.   3/2/2025    tamsulosin (FLOMAX) 0.4 MG capsule 24 hr capsule Take 1 capsule by mouth Daily for 360 days. 90 capsule 3 3/2/2025    temazepam (RESTORIL) 30 MG capsule Take 1 capsule by mouth At Night As Needed.   3/2/2025    warfarin (COUMADIN) 5 MG tablet TAKE 10MG BY MOUTH 4 DAYS A WEEK AND 6MG THREE DAYS A WEEK 132 tablet 1 2/20/2025       Allergies:  Patient has no known allergies.        Objective     Blood pressure 134/65, pulse 50, temperature 97 °F (36.1 °C), temperature source Temporal, resp. rate 11, weight 124 kg (273 lb 2.4 oz), SpO2 95%.    Physical Exam   Constitutional: Pt is oriented to person, place, and time and well-developed, well-nourished, and in no distress.   Mouth/Throat: Oropharynx is clear and moist.   Neck: Normal range of motion.   Cardiovascular: Normal rate, regular rhythm and normal heart sounds.    Pulmonary/Chest: Effort normal and breath sounds normal.   Abdominal: Soft. Nontender  Skin: Skin is warm and dry.   Psychiatric: Mood, memory, affect and judgment normal.     Assessment & Plan     Diagnosis:  Screening colonoscopy       Anticipated Surgical Procedure:  colonoscopy    The risks, benefits, and alternatives of this procedure have been discussed with the patient or the responsible party- the patient understands and agrees to proceed.

## 2025-03-03 NOTE — TELEPHONE ENCOUNTER
"  Hub staff attempted to follow warm transfer process and was unsuccessful     Caller: Freedom Stevens \"Toro\"    Relationship to patient: Self    Best call back number: [phone]    Patient is needing: PT WENT TO [facility]  TO PICKUP THE ENOXAPARIN SODIUM INJECTION BUT THEY DO NOT HAVE  MG.  THEY HAVE OTHER MG'S.  CAN YOU PLEASE CALL [facility] OR SEND THEM AN ALTERNATIVE?  [facility]  ph [phone]  [phone].      THEN CALL PT TO ADVISE OF CHANGE.      "

## 2025-03-03 NOTE — ANESTHESIA POSTPROCEDURE EVALUATION
Patient: Freedom Stevens    Procedure Summary       Date: 03/03/25 Room / Location: [facility] ENDOSCOPY 2 / [facility] ENDOSCOPY    Anesthesia Start: 0853 Anesthesia Stop: 0916    Procedure: COLONOSCOPY with hot snare polypectomy Diagnosis:       Encounter for screening for malignant neoplasm of colon      Family history of colon cancer      (Encounter for screening for malignant neoplasm of colon [Z12.11])      (Family history of colon cancer [Z80.0])    Surgeons: Shorty Reed MD Provider: Douglas Ribeiro CRNA    Anesthesia Type: general ASA Status: 3            Anesthesia Type: general    Vitals  Vitals Value Taken Time   /75 03/03/25 0930   Temp 36.6 °C (97.8 °F) 03/03/25 0930   Pulse 70 03/03/25 0930   Resp 13 03/03/25 0930   SpO2 95 % 03/03/25 0930           Post Anesthesia Care and Evaluation    Patient location during evaluation: bedside  Patient participation: complete - patient participated  Level of consciousness: awake  Pain management: adequate    Airway patency: patent  Anesthetic complications: No anesthetic complications  PONV Status: controlled  Cardiovascular status: acceptable and stable  Respiratory status: acceptable

## 2025-03-03 NOTE — TELEPHONE ENCOUNTER
Spoke with pharmacy, they have 60mg/ml, ok per Monique for patient to give 2 injections every 12hrs x 3 days. Patient advised & verabalized understanding

## 2025-03-05 LAB
CYTO UR: NORMAL
LAB AP CASE REPORT: NORMAL
LAB AP CLINICAL INFORMATION: NORMAL
PATH REPORT.FINAL DX SPEC: NORMAL
PATH REPORT.GROSS SPEC: NORMAL

## 2025-03-13 ENCOUNTER — ANTICOAGULATION VISIT (OUTPATIENT)
Dept: FAMILY MEDICINE CLINIC | Age: 60
End: 2025-03-13
Payer: COMMERCIAL

## 2025-03-13 VITALS — DIASTOLIC BLOOD PRESSURE: 68 MMHG | SYSTOLIC BLOOD PRESSURE: 135 MMHG | HEART RATE: 50 BPM

## 2025-03-13 DIAGNOSIS — Z95.2 HX OF AORTIC VALVE REPLACEMENT, MECHANICAL: Primary | ICD-10-CM

## 2025-03-13 LAB — INR PPP: 2.1 (ref 2.5–3.5)

## 2025-03-13 PROCEDURE — 85610 PROTHROMBIN TIME: CPT | Performed by: FAMILY MEDICINE

## 2025-03-13 PROCEDURE — 93793 ANTICOAG MGMT PT WARFARIN: CPT | Performed by: FAMILY MEDICINE

## 2025-03-13 PROCEDURE — 36416 COLLJ CAPILLARY BLOOD SPEC: CPT | Performed by: FAMILY MEDICINE

## 2025-04-01 ENCOUNTER — TELEPHONE (OUTPATIENT)
Dept: FAMILY MEDICINE CLINIC | Age: 60
End: 2025-04-01
Payer: COMMERCIAL

## 2025-04-03 ENCOUNTER — ANTICOAGULATION VISIT (OUTPATIENT)
Dept: FAMILY MEDICINE CLINIC | Age: 60
End: 2025-04-03
Payer: COMMERCIAL

## 2025-04-03 VITALS — SYSTOLIC BLOOD PRESSURE: 130 MMHG | HEART RATE: 65 BPM | DIASTOLIC BLOOD PRESSURE: 78 MMHG

## 2025-04-03 DIAGNOSIS — Z95.2 HX OF AORTIC VALVE REPLACEMENT, MECHANICAL: Primary | ICD-10-CM

## 2025-04-03 LAB — INR PPP: 3.2 (ref 2.5–3.5)

## 2025-04-03 PROCEDURE — 36416 COLLJ CAPILLARY BLOOD SPEC: CPT | Performed by: FAMILY MEDICINE

## 2025-04-03 PROCEDURE — 85610 PROTHROMBIN TIME: CPT | Performed by: FAMILY MEDICINE

## 2025-04-03 PROCEDURE — 93793 ANTICOAG MGMT PT WARFARIN: CPT | Performed by: FAMILY MEDICINE

## 2025-04-16 ENCOUNTER — RESULTS FOLLOW-UP (OUTPATIENT)
Dept: FAMILY MEDICINE CLINIC | Age: 60
End: 2025-04-16
Payer: COMMERCIAL

## 2025-05-01 ENCOUNTER — ANTICOAGULATION VISIT (OUTPATIENT)
Dept: FAMILY MEDICINE CLINIC | Age: 60
End: 2025-05-01
Payer: COMMERCIAL

## 2025-05-01 VITALS — HEART RATE: 55 BPM | DIASTOLIC BLOOD PRESSURE: 69 MMHG | SYSTOLIC BLOOD PRESSURE: 140 MMHG

## 2025-05-01 DIAGNOSIS — I10 ESSENTIAL HYPERTENSION: ICD-10-CM

## 2025-05-01 DIAGNOSIS — Z95.2 HX OF AORTIC VALVE REPLACEMENT, MECHANICAL: Primary | ICD-10-CM

## 2025-05-01 LAB — INR PPP: 4.5 (ref 2.5–3.5)

## 2025-05-01 PROCEDURE — 93793 ANTICOAG MGMT PT WARFARIN: CPT | Performed by: FAMILY MEDICINE

## 2025-05-01 PROCEDURE — 85610 PROTHROMBIN TIME: CPT | Performed by: FAMILY MEDICINE

## 2025-05-01 PROCEDURE — 36416 COLLJ CAPILLARY BLOOD SPEC: CPT | Performed by: FAMILY MEDICINE

## 2025-05-01 RX ORDER — METOPROLOL SUCCINATE 100 MG/1
100 TABLET, EXTENDED RELEASE ORAL DAILY
Qty: 90 TABLET | Refills: 0 | OUTPATIENT
Start: 2025-05-01

## 2025-05-02 ENCOUNTER — ANTICOAGULATION VISIT (OUTPATIENT)
Dept: FAMILY MEDICINE CLINIC | Age: 60
End: 2025-05-02
Payer: COMMERCIAL

## 2025-05-02 VITALS — SYSTOLIC BLOOD PRESSURE: 132 MMHG | DIASTOLIC BLOOD PRESSURE: 84 MMHG | HEART RATE: 84 BPM

## 2025-05-02 DIAGNOSIS — Z95.2 HX OF AORTIC VALVE REPLACEMENT, MECHANICAL: Primary | ICD-10-CM

## 2025-05-02 LAB — INR PPP: 3.5 (ref 2.5–3.5)

## 2025-05-02 PROCEDURE — 93793 ANTICOAG MGMT PT WARFARIN: CPT | Performed by: FAMILY MEDICINE

## 2025-05-02 PROCEDURE — 36416 COLLJ CAPILLARY BLOOD SPEC: CPT | Performed by: FAMILY MEDICINE

## 2025-05-02 PROCEDURE — 85610 PROTHROMBIN TIME: CPT | Performed by: FAMILY MEDICINE

## 2025-05-09 ENCOUNTER — ANTICOAGULATION VISIT (OUTPATIENT)
Dept: FAMILY MEDICINE CLINIC | Age: 60
End: 2025-05-09
Payer: COMMERCIAL

## 2025-05-09 VITALS — SYSTOLIC BLOOD PRESSURE: 137 MMHG | HEART RATE: 56 BPM | DIASTOLIC BLOOD PRESSURE: 75 MMHG

## 2025-05-09 DIAGNOSIS — Z95.2 HX OF AORTIC VALVE REPLACEMENT, MECHANICAL: Primary | ICD-10-CM

## 2025-05-09 LAB — INR PPP: 2.5 (ref 2.5–3.5)

## 2025-05-09 PROCEDURE — 36416 COLLJ CAPILLARY BLOOD SPEC: CPT | Performed by: FAMILY MEDICINE

## 2025-05-09 PROCEDURE — 85610 PROTHROMBIN TIME: CPT | Performed by: FAMILY MEDICINE

## 2025-05-09 PROCEDURE — 93793 ANTICOAG MGMT PT WARFARIN: CPT | Performed by: FAMILY MEDICINE

## 2025-05-16 ENCOUNTER — OFFICE VISIT (OUTPATIENT)
Dept: FAMILY MEDICINE CLINIC | Age: 60
End: 2025-05-16
Payer: COMMERCIAL

## 2025-05-16 ENCOUNTER — RESULTS FOLLOW-UP (OUTPATIENT)
Dept: FAMILY MEDICINE CLINIC | Age: 60
End: 2025-05-16

## 2025-05-16 ENCOUNTER — LAB (OUTPATIENT)
Dept: LAB | Facility: HOSPITAL | Age: 60
End: 2025-05-16
Payer: COMMERCIAL

## 2025-05-16 VITALS
DIASTOLIC BLOOD PRESSURE: 73 MMHG | WEIGHT: 268.8 LBS | BODY MASS INDEX: 37.63 KG/M2 | TEMPERATURE: 98.1 F | HEIGHT: 71 IN | OXYGEN SATURATION: 98 % | SYSTOLIC BLOOD PRESSURE: 122 MMHG | HEART RATE: 68 BPM

## 2025-05-16 DIAGNOSIS — I10 ESSENTIAL HYPERTENSION: ICD-10-CM

## 2025-05-16 DIAGNOSIS — E11.9 TYPE 2 DIABETES MELLITUS WITHOUT COMPLICATION, WITHOUT LONG-TERM CURRENT USE OF INSULIN: ICD-10-CM

## 2025-05-16 DIAGNOSIS — F31.61 BIPOLAR DISORDER, CURRENT EPISODE MIXED, MILD: ICD-10-CM

## 2025-05-16 DIAGNOSIS — E78.2 MIXED HYPERLIPIDEMIA: ICD-10-CM

## 2025-05-16 DIAGNOSIS — Z95.2 HX OF AORTIC VALVE REPLACEMENT, MECHANICAL: ICD-10-CM

## 2025-05-16 DIAGNOSIS — E11.9 TYPE 2 DIABETES MELLITUS WITHOUT COMPLICATION, WITHOUT LONG-TERM CURRENT USE OF INSULIN: Primary | ICD-10-CM

## 2025-05-16 DIAGNOSIS — K21.9 GASTROESOPHAGEAL REFLUX DISEASE WITHOUT ESOPHAGITIS: ICD-10-CM

## 2025-05-16 PROBLEM — I50.32 CHRONIC DIASTOLIC CHF (CONGESTIVE HEART FAILURE): Status: ACTIVE | Noted: 2025-01-28

## 2025-05-16 LAB
ALBUMIN SERPL-MCNC: 4.5 G/DL (ref 3.5–5.2)
ALBUMIN/GLOB SERPL: 1.7 G/DL
ALP SERPL-CCNC: 55 U/L (ref 39–117)
ALT SERPL W P-5'-P-CCNC: 22 U/L (ref 1–41)
ANION GAP SERPL CALCULATED.3IONS-SCNC: 12.1 MMOL/L (ref 5–15)
AST SERPL-CCNC: 31 U/L (ref 1–40)
BILIRUB SERPL-MCNC: 0.6 MG/DL (ref 0–1.2)
BUN SERPL-MCNC: 19 MG/DL (ref 8–23)
BUN/CREAT SERPL: 19.2 (ref 7–25)
CALCIUM SPEC-SCNC: 9.9 MG/DL (ref 8.6–10.5)
CHLORIDE SERPL-SCNC: 103 MMOL/L (ref 98–107)
CHOLEST SERPL-MCNC: 111 MG/DL (ref 0–200)
CO2 SERPL-SCNC: 22.9 MMOL/L (ref 22–29)
CREAT SERPL-MCNC: 0.99 MG/DL (ref 0.76–1.27)
EGFRCR SERPLBLD CKD-EPI 2021: 86.7 ML/MIN/1.73
GLOBULIN UR ELPH-MCNC: 2.7 GM/DL
GLUCOSE SERPL-MCNC: 161 MG/DL (ref 65–99)
HBA1C MFR BLD: 6.2 % (ref 4.8–5.6)
HDLC SERPL-MCNC: 33 MG/DL (ref 40–60)
INR PPP: 3 (ref 2.5–3.5)
LDLC SERPL CALC-MCNC: 47 MG/DL (ref 0–100)
LDLC/HDLC SERPL: 1.22 {RATIO}
POTASSIUM SERPL-SCNC: 3.9 MMOL/L (ref 3.5–5.2)
PROT SERPL-MCNC: 7.2 G/DL (ref 6–8.5)
SODIUM SERPL-SCNC: 138 MMOL/L (ref 136–145)
TRIGL SERPL-MCNC: 189 MG/DL (ref 0–150)
VLDLC SERPL-MCNC: 31 MG/DL (ref 5–40)

## 2025-05-16 PROCEDURE — 36415 COLL VENOUS BLD VENIPUNCTURE: CPT

## 2025-05-16 PROCEDURE — 83036 HEMOGLOBIN GLYCOSYLATED A1C: CPT

## 2025-05-16 PROCEDURE — 80053 COMPREHEN METABOLIC PANEL: CPT

## 2025-05-16 PROCEDURE — 80061 LIPID PANEL: CPT

## 2025-05-16 RX ORDER — HYDROCHLOROTHIAZIDE 50 MG/1
50 TABLET ORAL DAILY
Qty: 90 TABLET | Refills: 1 | Status: SHIPPED | OUTPATIENT
Start: 2025-05-16

## 2025-05-16 RX ORDER — PRAVASTATIN SODIUM 20 MG
20 TABLET ORAL
Qty: 90 TABLET | Refills: 1 | Status: SHIPPED | OUTPATIENT
Start: 2025-05-16

## 2025-05-16 RX ORDER — FAMOTIDINE 40 MG/1
40 TABLET, FILM COATED ORAL NIGHTLY
Qty: 90 TABLET | Refills: 1 | Status: SHIPPED | OUTPATIENT
Start: 2025-05-16

## 2025-05-16 RX ORDER — WARFARIN SODIUM 5 MG/1
TABLET ORAL
Qty: 132 TABLET | Refills: 1 | Status: SHIPPED | OUTPATIENT
Start: 2025-05-16

## 2025-05-16 RX ORDER — CARVEDILOL 12.5 MG/1
12.5 TABLET ORAL 2 TIMES DAILY WITH MEALS
Qty: 180 TABLET | Refills: 1 | Status: SHIPPED | OUTPATIENT
Start: 2025-05-16

## 2025-05-16 RX ORDER — LOSARTAN POTASSIUM 25 MG/1
50 TABLET ORAL 2 TIMES DAILY
Qty: 360 TABLET | Refills: 1 | Status: SHIPPED | OUTPATIENT
Start: 2025-05-16

## 2025-05-16 RX ORDER — AMLODIPINE BESYLATE 10 MG/1
10 TABLET ORAL DAILY
Qty: 90 TABLET | Refills: 1 | Status: SHIPPED | OUTPATIENT
Start: 2025-05-16

## 2025-05-16 NOTE — ASSESSMENT & PLAN NOTE
Stable on pravastatin 20 mg daily.  Refills were needed today.  Labs were due today.  Continue to monitor.  Orders:    pravastatin (PRAVACHOL) 20 MG tablet; Take 1 tablet by mouth every night at bedtime.

## 2025-05-16 NOTE — PROGRESS NOTES
"Chief Complaint  Diabetes    Subjective          Freedom Stevens presents to [facility] today for follow-up of routine issues.    He is on metformin for diabetes.  He has previously been on Victoza.   Last A1c 6.9 in Jan.  He is on a statin and ARB.  He is due for foot exam (4/2024).  He is UTD on eye exam, last done 1/2025.     He is on amlodipine, carvedilol, HCTZ, and losartan for HTN.  He follows with Dr. Tellez [facility].      He is on pravastatin for HLD.      He is on warfarin for anticoagulation of  mechanical AV.  Follows with Dr. Tellez Cardiology but we are managing the INR.    He sees CT surgery once a year as well as Dr. Tellez for 4.7 cm fusiform ascending aortic aneurysm.  S/p ascending aortic aneurysm repair and aortic valve replacement.  INR 3.00 today.        He follows with Dr. Mccauley for bipolar d/o with depression and anxiety.  He is on lithium, temazepam, risperidone, and pramipexole.  His mood has been \"pretty good.\"  He has had some mild depressed mood.  Intermittent.   He is currently in the middle of a prolonged divorce.  No SI/HI.      +Thyroid nodule, worked up at [facility] and this was benign.     +Colonoscopy by Dr. Reed in March.       +DORIAN but is intolerant of CPAP.      Current Outpatient Medications:     amLODIPine (NORVASC) 10 MG tablet, Take 1 tablet by mouth Daily., Disp: 90 tablet, Rfl: 1    carvedilol (COREG) 12.5 MG tablet, Take 1 tablet by mouth 2 (Two) Times a Day With Meals., Disp: 180 tablet, Rfl: 1    famotidine (PEPCID) 40 MG tablet, Take 1 tablet by mouth Every Night., Disp: 90 tablet, Rfl: 1    Glucose Blood (Blood Glucose Test Strips 333) strip, Use 1 each Daily. DX E11.9, Disp: 300 strip, Rfl: 1    hydroCHLOROthiazide 50 MG tablet, Take 1 tablet by mouth Daily., Disp: 90 tablet, Rfl: 1    lithium (ESKALITH) 450 MG CR tablet, Take 1 tablet by mouth 2 (Two) Times a Day., Disp: , Rfl:     LORazepam (ATIVAN) 1 MG " "tablet, Take 1 tablet by mouth., Disp: , Rfl:     losartan (COZAAR) 25 MG tablet, Take 2 tablets by mouth 2 (Two) Times a Day., Disp: 360 tablet, Rfl: 1    metFORMIN (GLUCOPHAGE) 500 MG tablet, Take 1 tablet by mouth Daily., Disp: 90 tablet, Rfl: 1    pramipexole (MIRAPEX) 0.5 MG tablet, Take 1 tablet by mouth 2 (Two) Times a Day., Disp: , Rfl:     pravastatin (PRAVACHOL) 20 MG tablet, Take 1 tablet by mouth every night at bedtime., Disp: 90 tablet, Rfl: 1    risperiDONE (risperDAL) 2 MG tablet, Take 1 tablet by mouth Daily., Disp: , Rfl:     tamsulosin (FLOMAX) 0.4 MG capsule 24 hr capsule, Take 1 capsule by mouth Daily for 360 days., Disp: 90 capsule, Rfl: 3    temazepam (RESTORIL) 30 MG capsule, Take 1 capsule by mouth At Night As Needed., Disp: , Rfl:     warfarin (COUMADIN) 5 MG tablet, TAKE 10MG BY MOUTH 4 DAYS A WEEK AND 6MG THREE DAYS A WEEK, Disp: 132 tablet, Rfl: 1  Medications Discontinued During This Encounter   Medication Reason    warfarin (COUMADIN) 5 MG tablet Reorder    pravastatin (PRAVACHOL) 20 MG tablet Reorder    metFORMIN (GLUCOPHAGE) 500 MG tablet Reorder    losartan (COZAAR) 25 MG tablet Reorder    hydroCHLOROthiazide 50 MG tablet Reorder    famotidine (PEPCID) 40 MG tablet Reorder    carvedilol (COREG) 12.5 MG tablet Reorder    amLODIPine (NORVASC) 10 MG tablet Reorder         Allergies:  Patient has no known allergies.      Objective   Vital Signs:   Vitals:    05/16/25 0840 05/16/25 0843   BP:  122/73   BP Location:  Left arm   Patient Position:  Sitting   Pulse:  68   Temp:  98.1 °F (36.7 °C)   TempSrc:  Oral   SpO2:  98%   Weight:  122 kg (268 lb 12.8 oz)   Height: 180.3 cm (70.98\") 180.3 cm (70.98\")     Body mass index is 37.51 kg/m².  Class 2 Severe Obesity (BMI >=35 and <=39.9). Obesity-related health conditions include the following: hypertension, coronary heart disease, diabetes mellitus, and dyslipidemias. Obesity is improving with lifestyle modifications. BMI is is above average; " BMI management plan is completed. We discussed portion control and increasing exercise.        Physical Exam  Vitals reviewed.   Constitutional:       General: He is not in acute distress.     Appearance: Normal appearance. He is well-developed.   HENT:      Head: Normocephalic and atraumatic.      Right Ear: External ear normal.      Left Ear: External ear normal.   Eyes:      Extraocular Movements: Extraocular movements intact.      Conjunctiva/sclera: Conjunctivae normal.      Pupils: Pupils are equal, round, and reactive to light.   Cardiovascular:      Rate and Rhythm: Normal rate and regular rhythm.      Pulses:           Dorsalis pedis pulses are 2+ on the right side and 2+ on the left side.      Heart sounds: No murmur heard.     Comments: +click  Pulmonary:      Effort: Pulmonary effort is normal.      Breath sounds: Normal breath sounds. No wheezing, rhonchi or rales.   Abdominal:      General: Bowel sounds are normal. There is no distension.      Palpations: Abdomen is soft.      Tenderness: There is no abdominal tenderness.   Musculoskeletal:         General: Normal range of motion.   Feet:      Right foot:      Protective Sensation: 3 sites tested.  3 sites sensed.      Skin integrity: Skin integrity normal. No ulcer or blister.      Toenail Condition: Right toenails are abnormally thick.      Left foot:      Protective Sensation: 3 sites tested.  3 sites sensed.      Skin integrity: Skin integrity normal. No ulcer or blister.      Toenail Condition: Left toenails are normal.      Comments: Diabetic Foot Exam Performed and Monofilament Test Performed     3rd toe R foot fungal toenail  Neurological:      Mental Status: He is alert.   Psychiatric:         Mood and Affect: Affect normal.           Lab Results   Component Value Date    GLUCOSE 155 (H) 10/31/2024    BUN 14 10/31/2024    CREATININE 0.96 10/31/2024    EGFRIFNONA 70 12/07/2021    EGFRIFAFRI 92 01/08/2018    BCR 14.6 10/31/2024    K 4.3  10/31/2024    CO2 26.0 10/31/2024    CALCIUM 9.4 10/31/2024    ALBUMIN 4.6 09/26/2024    AST 45 (H) 09/26/2024    ALT 32 09/26/2024       Lab Results   Component Value Date    CHOL 103 09/26/2024    CHLPL 125 03/11/2021    TRIG 194 (H) 09/26/2024    HDL 30 (L) 09/26/2024    LDL 41 09/26/2024       Lab Results   Component Value Date    WBC 5.84 09/26/2024    HGB 13.2 09/26/2024    HCT 40.0 09/26/2024    MCV 91.7 09/26/2024     09/26/2024            Assessment and Plan    Assessment & Plan  Type 2 diabetes mellitus without complication, without long-term current use of insulin  He is on metformin for diabetes.  He has previously been on Victoza.   Last A1c 6.9 in Jan.  He is on a statin and ARB.  He is due for foot exam (4/2024); exam today normal.  He is UTD on eye exam, last done 1/2025.     Orders:    Comprehensive Metabolic Panel; Future    Lipid Panel; Future    Hemoglobin A1c; Future    metFORMIN (GLUCOPHAGE) 500 MG tablet; Take 1 tablet by mouth Daily.    Essential hypertension  Blood pressure has been running at goal.  Continue amlodipine 10 mg daily, carvedilol 12.5 mg twice daily, HCTZ 50 mg daily and losartan 50 mg twice daily.  Refills were needed today.  Labs were needed today.  Continue to monitor.    Orders:    Comprehensive Metabolic Panel; Future    Lipid Panel; Future    amLODIPine (NORVASC) 10 MG tablet; Take 1 tablet by mouth Daily.    carvedilol (COREG) 12.5 MG tablet; Take 1 tablet by mouth 2 (Two) Times a Day With Meals.    hydroCHLOROthiazide 50 MG tablet; Take 1 tablet by mouth Daily.    losartan (COZAAR) 25 MG tablet; Take 2 tablets by mouth 2 (Two) Times a Day.    Mixed hyperlipidemia     Stable on pravastatin 20 mg daily.  Refills were needed today.  Labs were due today.  Continue to monitor.  Orders:    pravastatin (PRAVACHOL) 20 MG tablet; Take 1 tablet by mouth every night at bedtime.    Hx of aortic valve replacement, mechanical  INR today is therapeutic at 3.0.  Return in 3  weeks for recheck.  Orders:    POC INR    warfarin (COUMADIN) 5 MG tablet; TAKE 10MG BY MOUTH 4 DAYS A WEEK AND 6MG THREE DAYS A WEEK    Bipolar disorder, current episode mixed, mild  Following with Dr. Mccauley.  Stable on lithium, risperidone, temazepam, and pramipexole.        Gastroesophageal reflux disease without esophagitis  Stable on famotidine 40 mg daily.  Refills were needed today.  Continue to monitor.    Orders:    famotidine (PEPCID) 40 MG tablet; Take 1 tablet by mouth Every Night.              Follow Up   Return in about 6 months (around 11/16/2025) for Recheck.  Patient was given instructions and counseling regarding his condition or for health maintenance advice. Please see specific information pulled into the AVS if appropriate.           05/16/2025

## 2025-05-16 NOTE — ASSESSMENT & PLAN NOTE
INR today is therapeutic at 3.0.  Return in 3 weeks for recheck.  Orders:    POC INR    warfarin (COUMADIN) 5 MG tablet; TAKE 10MG BY MOUTH 4 DAYS A WEEK AND 6MG THREE DAYS A WEEK

## 2025-05-16 NOTE — ASSESSMENT & PLAN NOTE
Blood pressure has been running at goal.  Continue amlodipine 10 mg daily, carvedilol 12.5 mg twice daily, HCTZ 50 mg daily and losartan 50 mg twice daily.  Refills were needed today.  Labs were needed today.  Continue to monitor.    Orders:    Comprehensive Metabolic Panel; Future    Lipid Panel; Future    amLODIPine (NORVASC) 10 MG tablet; Take 1 tablet by mouth Daily.    carvedilol (COREG) 12.5 MG tablet; Take 1 tablet by mouth 2 (Two) Times a Day With Meals.    hydroCHLOROthiazide 50 MG tablet; Take 1 tablet by mouth Daily.    losartan (COZAAR) 25 MG tablet; Take 2 tablets by mouth 2 (Two) Times a Day.

## 2025-05-16 NOTE — ASSESSMENT & PLAN NOTE
He is on metformin for diabetes.  He has previously been on Victoza.   Last A1c 6.9 in Jan.  He is on a statin and ARB.  He is due for foot exam (4/2024); exam today normal.  He is UTD on eye exam, last done 1/2025.     Orders:    Comprehensive Metabolic Panel; Future    Lipid Panel; Future    Hemoglobin A1c; Future    metFORMIN (GLUCOPHAGE) 500 MG tablet; Take 1 tablet by mouth Daily.

## 2025-05-16 NOTE — Clinical Note
Toro can come back in 3 weeks.  He'll stop by allergy room on his way out but I already told him.  Thanks, ASHLIE

## 2025-05-22 ENCOUNTER — ANTICOAGULATION VISIT (OUTPATIENT)
Dept: FAMILY MEDICINE CLINIC | Age: 60
End: 2025-05-22
Payer: COMMERCIAL

## 2025-05-22 VITALS — SYSTOLIC BLOOD PRESSURE: 129 MMHG | HEART RATE: 74 BPM | DIASTOLIC BLOOD PRESSURE: 78 MMHG

## 2025-05-22 DIAGNOSIS — Z95.2 HX OF AORTIC VALVE REPLACEMENT, MECHANICAL: Primary | ICD-10-CM

## 2025-05-22 LAB — INR PPP: 2.9 (ref 2.5–3)

## 2025-05-22 PROCEDURE — 93793 ANTICOAG MGMT PT WARFARIN: CPT | Performed by: INTERNAL MEDICINE

## 2025-05-22 PROCEDURE — 85610 PROTHROMBIN TIME: CPT | Performed by: INTERNAL MEDICINE

## 2025-05-22 PROCEDURE — 36416 COLLJ CAPILLARY BLOOD SPEC: CPT | Performed by: INTERNAL MEDICINE

## 2025-05-28 ENCOUNTER — TELEPHONE (OUTPATIENT)
Dept: FAMILY MEDICINE CLINIC | Age: 60
End: 2025-05-28
Payer: COMMERCIAL

## 2025-05-28 NOTE — TELEPHONE ENCOUNTER
OTC meds such as cetirizine/Zyrtec, fexofenadine/Allegra, etc as well as Flonase nasal spray will not interact with his current med list.  Thanks, BZMIGUEL

## 2025-06-05 ENCOUNTER — ANTICOAGULATION VISIT (OUTPATIENT)
Dept: FAMILY MEDICINE CLINIC | Age: 60
End: 2025-06-05
Payer: COMMERCIAL

## 2025-06-05 VITALS — DIASTOLIC BLOOD PRESSURE: 71 MMHG | HEART RATE: 75 BPM | SYSTOLIC BLOOD PRESSURE: 128 MMHG

## 2025-06-05 DIAGNOSIS — Z95.2 HX OF AORTIC VALVE REPLACEMENT, MECHANICAL: Primary | ICD-10-CM

## 2025-06-05 LAB — INR PPP: 2.7 (ref 2.5–3.5)

## 2025-06-05 PROCEDURE — 36416 COLLJ CAPILLARY BLOOD SPEC: CPT | Performed by: FAMILY MEDICINE

## 2025-06-05 PROCEDURE — 85610 PROTHROMBIN TIME: CPT | Performed by: FAMILY MEDICINE

## 2025-06-05 PROCEDURE — 93793 ANTICOAG MGMT PT WARFARIN: CPT | Performed by: FAMILY MEDICINE

## 2025-06-23 RX ORDER — WARFARIN SODIUM 1 MG/1
TABLET ORAL
Qty: 30 TABLET | Refills: 0 | Status: SHIPPED | OUTPATIENT
Start: 2025-06-23

## 2025-07-03 ENCOUNTER — ANTICOAGULATION VISIT (OUTPATIENT)
Dept: FAMILY MEDICINE CLINIC | Age: 60
End: 2025-07-03
Payer: COMMERCIAL

## 2025-07-03 VITALS — HEART RATE: 62 BPM | SYSTOLIC BLOOD PRESSURE: 129 MMHG | DIASTOLIC BLOOD PRESSURE: 66 MMHG

## 2025-07-03 DIAGNOSIS — Z95.2 HX OF AORTIC VALVE REPLACEMENT, MECHANICAL: Primary | ICD-10-CM

## 2025-07-03 LAB — INR PPP: 2.4 (ref 2.5–3.5)

## 2025-07-03 PROCEDURE — 93793 ANTICOAG MGMT PT WARFARIN: CPT | Performed by: FAMILY MEDICINE

## 2025-07-03 PROCEDURE — 36416 COLLJ CAPILLARY BLOOD SPEC: CPT | Performed by: FAMILY MEDICINE

## 2025-07-03 PROCEDURE — 85610 PROTHROMBIN TIME: CPT | Performed by: FAMILY MEDICINE

## 2025-07-17 ENCOUNTER — ANTICOAGULATION VISIT (OUTPATIENT)
Dept: FAMILY MEDICINE CLINIC | Age: 60
End: 2025-07-17
Payer: COMMERCIAL

## 2025-07-17 VITALS — SYSTOLIC BLOOD PRESSURE: 115 MMHG | DIASTOLIC BLOOD PRESSURE: 67 MMHG | HEART RATE: 69 BPM

## 2025-07-17 DIAGNOSIS — Z95.2 HX OF AORTIC VALVE REPLACEMENT, MECHANICAL: Primary | ICD-10-CM

## 2025-07-17 LAB — INR PPP: 3.5 (ref 2.5–3.5)

## 2025-07-17 PROCEDURE — 85610 PROTHROMBIN TIME: CPT | Performed by: FAMILY MEDICINE

## 2025-07-17 PROCEDURE — 36416 COLLJ CAPILLARY BLOOD SPEC: CPT | Performed by: FAMILY MEDICINE

## 2025-07-17 PROCEDURE — 93793 ANTICOAG MGMT PT WARFARIN: CPT | Performed by: FAMILY MEDICINE

## 2025-07-30 RX ORDER — WARFARIN SODIUM 1 MG/1
TABLET ORAL
Qty: 30 TABLET | Refills: 5 | Status: SHIPPED | OUTPATIENT
Start: 2025-07-30

## 2025-08-05 ENCOUNTER — ANTICOAGULATION VISIT (OUTPATIENT)
Dept: FAMILY MEDICINE CLINIC | Age: 60
End: 2025-08-05
Payer: COMMERCIAL

## 2025-08-05 VITALS — HEART RATE: 68 BPM | SYSTOLIC BLOOD PRESSURE: 137 MMHG | DIASTOLIC BLOOD PRESSURE: 76 MMHG

## 2025-08-05 DIAGNOSIS — Z95.2 HX OF AORTIC VALVE REPLACEMENT, MECHANICAL: Primary | ICD-10-CM

## 2025-08-05 LAB — INR PPP: 3.9 (ref 2.5–3.5)

## 2025-08-05 PROCEDURE — 93793 ANTICOAG MGMT PT WARFARIN: CPT | Performed by: FAMILY MEDICINE

## 2025-08-05 PROCEDURE — 36416 COLLJ CAPILLARY BLOOD SPEC: CPT | Performed by: FAMILY MEDICINE

## 2025-08-05 PROCEDURE — 85610 PROTHROMBIN TIME: CPT | Performed by: FAMILY MEDICINE

## 2025-08-14 ENCOUNTER — ANTICOAGULATION VISIT (OUTPATIENT)
Dept: FAMILY MEDICINE CLINIC | Age: 60
End: 2025-08-14
Payer: COMMERCIAL

## 2025-08-14 VITALS — HEART RATE: 62 BPM | SYSTOLIC BLOOD PRESSURE: 135 MMHG | DIASTOLIC BLOOD PRESSURE: 70 MMHG

## 2025-08-14 DIAGNOSIS — Z95.2 HX OF AORTIC VALVE REPLACEMENT, MECHANICAL: Primary | ICD-10-CM

## 2025-08-14 LAB — INR PPP: 3.6 (ref 2.5–3.5)

## 2025-08-14 PROCEDURE — 36416 COLLJ CAPILLARY BLOOD SPEC: CPT | Performed by: FAMILY MEDICINE

## 2025-08-14 PROCEDURE — 93793 ANTICOAG MGMT PT WARFARIN: CPT | Performed by: FAMILY MEDICINE

## 2025-08-14 PROCEDURE — 85610 PROTHROMBIN TIME: CPT | Performed by: FAMILY MEDICINE

## 2025-08-28 ENCOUNTER — ANTICOAGULATION VISIT (OUTPATIENT)
Dept: FAMILY MEDICINE CLINIC | Age: 60
End: 2025-08-28
Payer: COMMERCIAL

## 2025-08-28 VITALS — SYSTOLIC BLOOD PRESSURE: 137 MMHG | HEART RATE: 64 BPM | DIASTOLIC BLOOD PRESSURE: 75 MMHG

## 2025-08-28 DIAGNOSIS — Z95.2 HX OF AORTIC VALVE REPLACEMENT, MECHANICAL: Primary | ICD-10-CM

## 2025-08-28 LAB — INR PPP: 3 (ref 2.5–3.5)

## 2025-08-28 PROCEDURE — 93793 ANTICOAG MGMT PT WARFARIN: CPT | Performed by: FAMILY MEDICINE

## 2025-08-28 PROCEDURE — 36416 COLLJ CAPILLARY BLOOD SPEC: CPT | Performed by: FAMILY MEDICINE

## 2025-08-28 PROCEDURE — 85610 PROTHROMBIN TIME: CPT | Performed by: FAMILY MEDICINE

## (undated) DEVICE — DEFENDO AIR WATER SUCTION AND BIOPSY VALVE KIT FOR  OLYMPUS: Brand: DEFENDO AIR/WATER/SUCTION AND BIOPSY VALVE

## (undated) DEVICE — SNAR POLYP CAPTIFLEX XS/OVL 11X2.4MM 240CM 1P/U

## (undated) DEVICE — SOLIDIFIER LIQLOC PLS 1500CC BT

## (undated) DEVICE — CONN JET HYDRA H20 AUXILIARY DISP

## (undated) DEVICE — SOL IRRG H2O PL/BG 1000ML STRL

## (undated) DEVICE — LINER SURG CANSTR SXN S/RIGD 1500CC

## (undated) DEVICE — THE STERILE LIGHT HANDLE COVER IS USED WITH STERIS SURGICAL LIGHTING AND VISUALIZATION SYSTEMS.

## (undated) DEVICE — PAD GRND REM POLYHESIVE A/ DISP

## (undated) DEVICE — THE SINGLE USE ETRAP – POLYP TRAP IS USED FOR SUCTION RETRIEVAL OF ENDOSCOPICALLY REMOVED POLYPS.: Brand: ETRAP

## (undated) DEVICE — Device